# Patient Record
Sex: FEMALE | Race: WHITE | NOT HISPANIC OR LATINO | Employment: OTHER | URBAN - METROPOLITAN AREA
[De-identification: names, ages, dates, MRNs, and addresses within clinical notes are randomized per-mention and may not be internally consistent; named-entity substitution may affect disease eponyms.]

---

## 2018-03-01 ENCOUNTER — OFFICE VISIT (OUTPATIENT)
Dept: OBGYN CLINIC | Facility: CLINIC | Age: 73
End: 2018-03-01
Payer: MEDICARE

## 2018-03-01 VITALS
DIASTOLIC BLOOD PRESSURE: 88 MMHG | SYSTOLIC BLOOD PRESSURE: 142 MMHG | WEIGHT: 202 LBS | BODY MASS INDEX: 31.71 KG/M2 | HEIGHT: 67 IN

## 2018-03-01 DIAGNOSIS — Z01.419 WOMEN'S ANNUAL ROUTINE GYNECOLOGICAL EXAMINATION: ICD-10-CM

## 2018-03-01 DIAGNOSIS — Z12.4 PAP SMEAR FOR CERVICAL CANCER SCREENING: Primary | ICD-10-CM

## 2018-03-01 PROCEDURE — G0101 CA SCREEN;PELVIC/BREAST EXAM: HCPCS | Performed by: OBSTETRICS & GYNECOLOGY

## 2018-03-01 RX ORDER — LEVOTHYROXINE SODIUM 100 MCG
TABLET ORAL
COMMUNITY
Start: 2018-02-09 | End: 2019-05-08 | Stop reason: ALTCHOICE

## 2018-03-01 NOTE — PROGRESS NOTES
Assessment/Plan:    Normal gynecological exam, review some of the options for treating  atrophic discomfort that she has occasionally, she also some concerns about resuming intercourse as her  being treated for low testosterone and will be put on some medicine  We did discuss use of coconut oil and she may try that  We again encouraged her to consider getting a mammogram and she declines     Problem List Items Addressed This Visit     Women's annual routine gynecological examination      Other Visit Diagnoses     Pap smear for cervical cancer screening    -  Primary    Relevant Orders    GP Liquid-Based Pap + HPV Plus            Subjective:      Patient ID: June D Yessi Sagastume is a 74 y o  female  Here for annual gyn exam - overall well - no vaginal bleeding or discharge - declines breast imaging but follows yearly with Dr Emily Tamayo - bowels and bladder normal - up to date with colonoscopy, bone scan and blood work         The following portions of the patient's history were reviewed and updated as appropriate:   She  has a past medical history of Arthritis; Basal cell carcinoma; and Breast lump  She   Patient Active Problem List    Diagnosis Date Noted    Women's annual routine gynecological examination 03/01/2018    Arthritis 02/25/2016    Hypothyroidism 02/25/2016     She  has a past surgical history that includes Breast surgery; Blepharoptosis repair; Total hip arthroplasty (Bilateral); and Breast lumpectomy (Left)  Her family history includes Diabetes in her father; Uterine cancer in her family  She  reports that she has never smoked  She has never used smokeless tobacco  She reports that she does not drink alcohol or use drugs  Current Outpatient Prescriptions   Medication Sig Dispense Refill    SYNTHROID 100 MCG tablet        No current facility-administered medications for this visit  No current outpatient prescriptions on file prior to visit       No current facility-administered medications on file prior to visit  She is allergic to sulfa antibiotics       Review of Systems   Constitutional: Negative for fatigue, fever and unexpected weight change  HENT: Positive for sinus pain and sinus pressure  Negative for dental problem, mouth sores, nosebleeds, rhinorrhea and sore throat  Eyes: Negative for pain, discharge and visual disturbance  Respiratory: Negative for cough, chest tightness, shortness of breath and wheezing  Cardiovascular: Negative for chest pain, palpitations and leg swelling  Gastrointestinal: Negative for blood in stool, constipation, diarrhea, nausea and vomiting  Endocrine: Negative for polydipsia  Genitourinary: Negative for difficulty urinating, dyspareunia, dysuria, menstrual problem, pelvic pain, urgency, vaginal discharge and vaginal pain  Musculoskeletal: Positive for arthralgias  Negative for back pain and joint swelling  Allergic/Immunologic: Positive for environmental allergies  Neurological: Negative for seizures, light-headedness and headaches  Hematological: Does not bruise/bleed easily  Psychiatric/Behavioral: Negative for sleep disturbance  The patient is not nervous/anxious  Objective: There were no vitals taken for this visit  Physical Exam   Constitutional: She is oriented to person, place, and time  She appears well-developed  No distress  Overweight older white female    HENT:   Head: Normocephalic and atraumatic  Neck: Normal range of motion  Neck supple  No thyromegaly present  Cardiovascular: Normal rate and regular rhythm  Pulmonary/Chest: Effort normal and breath sounds normal  No respiratory distress  She has no wheezes  She has no rales  She exhibits no tenderness  Right breast exhibits no inverted nipple, no mass, no nipple discharge, no skin change and no tenderness  Left breast exhibits no inverted nipple, no mass, no nipple discharge, no skin change and no tenderness   Breasts are symmetrical  Abdominal: Soft  She exhibits no distension and no mass  There is no tenderness  There is no rebound and no guarding  Genitourinary: Rectal exam shows guaiac negative stool  Genitourinary Comments: Normal female with atrophic vulvovaginal area, cervix grossly normal appearance, uterus anterior, mobile, nontender, and no adnexal masses or tenderness on palpation  Rectal exam is normal   Neurological: She is alert and oriented to person, place, and time  Psychiatric: She has a normal mood and affect  Her behavior is normal    Vitals reviewed

## 2018-03-06 LAB
HPV HR 12 DNA CVX QL NAA+PROBE: NOT DETECTED
HPV16 DNA SPEC QL NAA+PROBE: NOT DETECTED
HPV18 DNA SPEC QL NAA+PROBE: NOT DETECTED
THIN PREP CVX: NORMAL

## 2019-02-05 LAB — HBA1C MFR BLD HPLC: 5.9 %

## 2019-03-01 LAB — HBA1C MFR BLD HPLC: 6 %

## 2019-04-16 ENCOUNTER — TRANSCRIBE ORDERS (OUTPATIENT)
Dept: ADMINISTRATIVE | Facility: HOSPITAL | Age: 74
End: 2019-04-16

## 2019-04-16 DIAGNOSIS — M25.572 LEFT ANKLE PAIN, UNSPECIFIED CHRONICITY: Primary | ICD-10-CM

## 2019-04-18 ENCOUNTER — HOSPITAL ENCOUNTER (OUTPATIENT)
Dept: RADIOLOGY | Facility: HOSPITAL | Age: 74
Discharge: HOME | End: 2019-04-18
Payer: MEDICARE

## 2019-04-18 DIAGNOSIS — M25.572 LEFT ANKLE PAIN, UNSPECIFIED CHRONICITY: ICD-10-CM

## 2019-04-18 PROCEDURE — 73721 MRI JNT OF LWR EXTRE W/O DYE: CPT

## 2019-05-08 ENCOUNTER — OFFICE VISIT (OUTPATIENT)
Dept: OBGYN CLINIC | Facility: CLINIC | Age: 74
End: 2019-05-08
Payer: MEDICARE

## 2019-05-08 VITALS
HEIGHT: 67 IN | HEART RATE: 97 BPM | BODY MASS INDEX: 32.05 KG/M2 | WEIGHT: 204.2 LBS | SYSTOLIC BLOOD PRESSURE: 120 MMHG | DIASTOLIC BLOOD PRESSURE: 77 MMHG

## 2019-05-08 DIAGNOSIS — M76.829 POSTERIOR TIBIALIS TENDON INSUFFICIENCY: ICD-10-CM

## 2019-05-08 DIAGNOSIS — M19.172 POST-TRAUMATIC OSTEOARTHRITIS OF LEFT ANKLE: Primary | ICD-10-CM

## 2019-05-08 PROCEDURE — 99203 OFFICE O/P NEW LOW 30 MIN: CPT | Performed by: ORTHOPAEDIC SURGERY

## 2019-05-08 RX ORDER — LEVOTHYROXINE SODIUM 88 MCG
TABLET ORAL
COMMUNITY
Start: 2019-02-11 | End: 2019-06-06 | Stop reason: DRUGHIGH

## 2019-05-13 ENCOUNTER — EVALUATION (OUTPATIENT)
Dept: PHYSICAL THERAPY | Facility: REHABILITATION | Age: 74
End: 2019-05-13
Payer: MEDICARE

## 2019-05-13 DIAGNOSIS — M76.829 POSTERIOR TIBIALIS TENDON INSUFFICIENCY: ICD-10-CM

## 2019-05-13 DIAGNOSIS — M19.172 POST-TRAUMATIC OSTEOARTHRITIS OF LEFT ANKLE: ICD-10-CM

## 2019-05-13 PROCEDURE — 97162 PT EVAL MOD COMPLEX 30 MIN: CPT | Performed by: PHYSICAL THERAPIST

## 2019-05-13 PROCEDURE — 97110 THERAPEUTIC EXERCISES: CPT | Performed by: PHYSICAL THERAPIST

## 2019-05-16 ENCOUNTER — OFFICE VISIT (OUTPATIENT)
Dept: PHYSICAL THERAPY | Facility: REHABILITATION | Age: 74
End: 2019-05-16
Payer: MEDICARE

## 2019-05-16 DIAGNOSIS — M76.829 POSTERIOR TIBIALIS TENDON INSUFFICIENCY: ICD-10-CM

## 2019-05-16 DIAGNOSIS — M19.172 POST-TRAUMATIC OSTEOARTHRITIS OF LEFT ANKLE: Primary | ICD-10-CM

## 2019-05-16 PROCEDURE — 97110 THERAPEUTIC EXERCISES: CPT | Performed by: PHYSICAL THERAPIST

## 2019-05-16 PROCEDURE — 97140 MANUAL THERAPY 1/> REGIONS: CPT | Performed by: PHYSICAL THERAPIST

## 2019-05-21 ENCOUNTER — OFFICE VISIT (OUTPATIENT)
Dept: PHYSICAL THERAPY | Facility: REHABILITATION | Age: 74
End: 2019-05-21
Payer: MEDICARE

## 2019-05-21 DIAGNOSIS — M76.829 POSTERIOR TIBIALIS TENDON INSUFFICIENCY: ICD-10-CM

## 2019-05-21 DIAGNOSIS — M19.172 POST-TRAUMATIC OSTEOARTHRITIS OF LEFT ANKLE: Primary | ICD-10-CM

## 2019-05-21 PROCEDURE — 97110 THERAPEUTIC EXERCISES: CPT

## 2019-05-21 PROCEDURE — 97140 MANUAL THERAPY 1/> REGIONS: CPT

## 2019-05-24 ENCOUNTER — APPOINTMENT (OUTPATIENT)
Dept: PHYSICAL THERAPY | Facility: REHABILITATION | Age: 74
End: 2019-05-24
Payer: MEDICARE

## 2019-05-28 ENCOUNTER — OFFICE VISIT (OUTPATIENT)
Dept: PHYSICAL THERAPY | Facility: REHABILITATION | Age: 74
End: 2019-05-28
Payer: MEDICARE

## 2019-05-28 DIAGNOSIS — M19.172 POST-TRAUMATIC OSTEOARTHRITIS OF LEFT ANKLE: Primary | ICD-10-CM

## 2019-05-28 DIAGNOSIS — M76.829 POSTERIOR TIBIALIS TENDON INSUFFICIENCY: ICD-10-CM

## 2019-05-28 PROCEDURE — 97112 NEUROMUSCULAR REEDUCATION: CPT

## 2019-05-28 PROCEDURE — 97110 THERAPEUTIC EXERCISES: CPT

## 2019-05-28 PROCEDURE — 97140 MANUAL THERAPY 1/> REGIONS: CPT

## 2019-05-30 ENCOUNTER — OFFICE VISIT (OUTPATIENT)
Dept: PHYSICAL THERAPY | Facility: REHABILITATION | Age: 74
End: 2019-05-30
Payer: MEDICARE

## 2019-05-30 DIAGNOSIS — M19.172 POST-TRAUMATIC OSTEOARTHRITIS OF LEFT ANKLE: Primary | ICD-10-CM

## 2019-05-30 DIAGNOSIS — M76.829 POSTERIOR TIBIALIS TENDON INSUFFICIENCY: ICD-10-CM

## 2019-05-30 PROCEDURE — 97140 MANUAL THERAPY 1/> REGIONS: CPT | Performed by: PHYSICAL THERAPIST

## 2019-05-30 PROCEDURE — 97110 THERAPEUTIC EXERCISES: CPT | Performed by: PHYSICAL THERAPIST

## 2019-06-03 ENCOUNTER — OFFICE VISIT (OUTPATIENT)
Dept: PHYSICAL THERAPY | Facility: REHABILITATION | Age: 74
End: 2019-06-03
Payer: MEDICARE

## 2019-06-03 DIAGNOSIS — M19.172 POST-TRAUMATIC OSTEOARTHRITIS OF LEFT ANKLE: Primary | ICD-10-CM

## 2019-06-03 DIAGNOSIS — M76.829 POSTERIOR TIBIALIS TENDON INSUFFICIENCY: ICD-10-CM

## 2019-06-03 PROCEDURE — 97112 NEUROMUSCULAR REEDUCATION: CPT

## 2019-06-03 PROCEDURE — 97110 THERAPEUTIC EXERCISES: CPT

## 2019-06-06 ENCOUNTER — OFFICE VISIT (OUTPATIENT)
Dept: FAMILY MEDICINE CLINIC | Facility: CLINIC | Age: 74
End: 2019-06-06
Payer: MEDICARE

## 2019-06-06 ENCOUNTER — OFFICE VISIT (OUTPATIENT)
Dept: PHYSICAL THERAPY | Facility: REHABILITATION | Age: 74
End: 2019-06-06
Payer: MEDICARE

## 2019-06-06 VITALS
HEIGHT: 66 IN | BODY MASS INDEX: 31.88 KG/M2 | OXYGEN SATURATION: 96 % | SYSTOLIC BLOOD PRESSURE: 122 MMHG | TEMPERATURE: 98.6 F | DIASTOLIC BLOOD PRESSURE: 70 MMHG | WEIGHT: 198.4 LBS | RESPIRATION RATE: 16 BRPM | HEART RATE: 85 BPM

## 2019-06-06 DIAGNOSIS — G89.29 CHRONIC BILATERAL THORACIC BACK PAIN: Primary | ICD-10-CM

## 2019-06-06 DIAGNOSIS — M79.7 FIBROMYALGIA: ICD-10-CM

## 2019-06-06 DIAGNOSIS — M94.0 COSTOCHONDRITIS: ICD-10-CM

## 2019-06-06 DIAGNOSIS — R73.01 IFG (IMPAIRED FASTING GLUCOSE): ICD-10-CM

## 2019-06-06 DIAGNOSIS — M19.172 POST-TRAUMATIC OSTEOARTHRITIS OF LEFT ANKLE: Primary | ICD-10-CM

## 2019-06-06 DIAGNOSIS — E03.9 ACQUIRED HYPOTHYROIDISM: ICD-10-CM

## 2019-06-06 DIAGNOSIS — M54.6 CHRONIC BILATERAL THORACIC BACK PAIN: Primary | ICD-10-CM

## 2019-06-06 DIAGNOSIS — M76.829 POSTERIOR TIBIALIS TENDON INSUFFICIENCY: ICD-10-CM

## 2019-06-06 PROCEDURE — 99204 OFFICE O/P NEW MOD 45 MIN: CPT | Performed by: FAMILY MEDICINE

## 2019-06-06 PROCEDURE — 97110 THERAPEUTIC EXERCISES: CPT | Performed by: PHYSICAL THERAPIST

## 2019-06-06 PROCEDURE — 97112 NEUROMUSCULAR REEDUCATION: CPT | Performed by: PHYSICAL THERAPIST

## 2019-06-06 RX ORDER — AMOXICILLIN 500 MG/1
500 CAPSULE ORAL 3 TIMES DAILY
Refills: 0 | COMMUNITY
Start: 2019-05-23 | End: 2019-06-06 | Stop reason: ALTCHOICE

## 2019-06-06 RX ORDER — LEVOTHYROXINE SODIUM 0.07 MG/1
75 TABLET ORAL
Qty: 30 TABLET | Refills: 5 | Status: SHIPPED | OUTPATIENT
Start: 2019-06-06 | End: 2019-07-03 | Stop reason: SDUPTHER

## 2019-06-10 PROBLEM — M94.0 COSTOCHONDRITIS: Status: ACTIVE | Noted: 2019-06-10

## 2019-06-10 PROBLEM — M54.6 CHRONIC BILATERAL THORACIC BACK PAIN: Status: ACTIVE | Noted: 2019-06-10

## 2019-06-10 PROBLEM — R73.01 IFG (IMPAIRED FASTING GLUCOSE): Status: ACTIVE | Noted: 2019-06-10

## 2019-06-10 PROBLEM — M79.7 FIBROMYALGIA: Status: ACTIVE | Noted: 2019-06-10

## 2019-06-10 PROBLEM — G89.29 CHRONIC BILATERAL THORACIC BACK PAIN: Status: ACTIVE | Noted: 2019-06-10

## 2019-06-11 ENCOUNTER — APPOINTMENT (OUTPATIENT)
Dept: PHYSICAL THERAPY | Facility: REHABILITATION | Age: 74
End: 2019-06-11
Payer: MEDICARE

## 2019-06-13 ENCOUNTER — OFFICE VISIT (OUTPATIENT)
Dept: PHYSICAL THERAPY | Facility: REHABILITATION | Age: 74
End: 2019-06-13
Payer: MEDICARE

## 2019-06-13 DIAGNOSIS — M76.829 POSTERIOR TIBIALIS TENDON INSUFFICIENCY: ICD-10-CM

## 2019-06-13 DIAGNOSIS — M19.172 POST-TRAUMATIC OSTEOARTHRITIS OF LEFT ANKLE: Primary | ICD-10-CM

## 2019-06-13 PROCEDURE — 97110 THERAPEUTIC EXERCISES: CPT | Performed by: PHYSICAL THERAPIST

## 2019-06-13 PROCEDURE — 97112 NEUROMUSCULAR REEDUCATION: CPT | Performed by: PHYSICAL THERAPIST

## 2019-06-18 ENCOUNTER — EVALUATION (OUTPATIENT)
Dept: PHYSICAL THERAPY | Facility: REHABILITATION | Age: 74
End: 2019-06-18
Payer: MEDICARE

## 2019-06-18 DIAGNOSIS — M79.7 FIBROMYALGIA: Primary | ICD-10-CM

## 2019-06-18 PROCEDURE — 97161 PT EVAL LOW COMPLEX 20 MIN: CPT | Performed by: PHYSICAL THERAPIST

## 2019-06-20 ENCOUNTER — OFFICE VISIT (OUTPATIENT)
Dept: PHYSICAL THERAPY | Facility: REHABILITATION | Age: 74
End: 2019-06-20
Payer: MEDICARE

## 2019-06-20 DIAGNOSIS — M76.829 POSTERIOR TIBIALIS TENDON INSUFFICIENCY: ICD-10-CM

## 2019-06-20 DIAGNOSIS — M79.7 FIBROMYALGIA: Primary | ICD-10-CM

## 2019-06-20 DIAGNOSIS — M19.172 POST-TRAUMATIC OSTEOARTHRITIS OF LEFT ANKLE: ICD-10-CM

## 2019-06-20 PROCEDURE — 97112 NEUROMUSCULAR REEDUCATION: CPT | Performed by: PHYSICAL THERAPIST

## 2019-06-20 PROCEDURE — 97110 THERAPEUTIC EXERCISES: CPT | Performed by: PHYSICAL THERAPIST

## 2019-06-25 ENCOUNTER — OFFICE VISIT (OUTPATIENT)
Dept: PHYSICAL THERAPY | Facility: REHABILITATION | Age: 74
End: 2019-06-25
Payer: MEDICARE

## 2019-06-25 DIAGNOSIS — M19.172 POST-TRAUMATIC OSTEOARTHRITIS OF LEFT ANKLE: ICD-10-CM

## 2019-06-25 DIAGNOSIS — M79.7 FIBROMYALGIA: Primary | ICD-10-CM

## 2019-06-25 DIAGNOSIS — M76.829 POSTERIOR TIBIALIS TENDON INSUFFICIENCY: ICD-10-CM

## 2019-06-25 PROCEDURE — 97110 THERAPEUTIC EXERCISES: CPT

## 2019-06-25 PROCEDURE — 97112 NEUROMUSCULAR REEDUCATION: CPT

## 2019-06-27 ENCOUNTER — OFFICE VISIT (OUTPATIENT)
Dept: PHYSICAL THERAPY | Facility: REHABILITATION | Age: 74
End: 2019-06-27
Payer: MEDICARE

## 2019-06-27 DIAGNOSIS — M19.172 POST-TRAUMATIC OSTEOARTHRITIS OF LEFT ANKLE: ICD-10-CM

## 2019-06-27 DIAGNOSIS — M79.7 FIBROMYALGIA: Primary | ICD-10-CM

## 2019-06-27 DIAGNOSIS — M76.829 POSTERIOR TIBIALIS TENDON INSUFFICIENCY: ICD-10-CM

## 2019-06-27 PROCEDURE — 97112 NEUROMUSCULAR REEDUCATION: CPT

## 2019-06-27 PROCEDURE — 97110 THERAPEUTIC EXERCISES: CPT

## 2019-07-01 ENCOUNTER — OFFICE VISIT (OUTPATIENT)
Dept: PHYSICAL THERAPY | Facility: REHABILITATION | Age: 74
End: 2019-07-01
Payer: MEDICARE

## 2019-07-01 DIAGNOSIS — M79.7 FIBROMYALGIA: Primary | ICD-10-CM

## 2019-07-01 DIAGNOSIS — M76.829 POSTERIOR TIBIALIS TENDON INSUFFICIENCY: ICD-10-CM

## 2019-07-01 DIAGNOSIS — M19.172 POST-TRAUMATIC OSTEOARTHRITIS OF LEFT ANKLE: ICD-10-CM

## 2019-07-01 PROCEDURE — 97110 THERAPEUTIC EXERCISES: CPT | Performed by: PHYSICAL THERAPIST

## 2019-07-01 PROCEDURE — 97112 NEUROMUSCULAR REEDUCATION: CPT | Performed by: PHYSICAL THERAPIST

## 2019-07-01 NOTE — PROGRESS NOTES
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        Start Time: 7195  Stop Time: 1618  Total time in clinic (min): 48 minutes    Subjective: Mariah reports that her "fibro was really bothering me the other day  I think I over do it "       Objective: See treatment diary below      Assessment: Tolerated treatment well  Patient demonstrated fatigue post treatment, exhibited good technique with therapeutic exercises and would benefit from continued PT  Tolerated additional exercises without issue  Plan: Continue per plan of care         Precautions: Fibromyalgia,     Daily Treatment Diary:    Manuals 2019     Thoracic spine rotation overpressure seated nv nv                            Shldr Exercises          UBE rev nv 5' lvl 1 5' lvl 1 5' lvl 1  5'10w               Scap retractions* 20 20 20x  20x       Diaphragmatic breathing* 5'  5'  5'       Supine breathing angels 20ea 20ea 20 x 20x  30x     Pectoral stretch  10x10" 10 x 10"  10 x10"  10x10''     Rows nv  OTB 3 x 10  OTB 3 x 10  [id number]     Low row elbows extended nv OTB 3x10 OTB 3 x 12  OTB 3 x 12 [id number]     TB Bilateral ER w retraction nv nv OTB 3 x 10  OTB 3 x 12  [id number]     TB Horizontal Abduction                     Pendulums          Pulleys flx, scap nv 2' ea 2' ea 2' ea 2' abd                         Clamshells           Sidelying SLR    2 x10  np     TG Squats     lvl 18 3 x 12  np     Seated ball rolls with rotation     5''x 5 ea                                                                                                                   * = on HEP

## 2019-07-02 ENCOUNTER — LAB (OUTPATIENT)
Dept: LAB | Facility: CLINIC | Age: 74
End: 2019-07-02
Payer: MEDICARE

## 2019-07-02 DIAGNOSIS — E03.9 ACQUIRED HYPOTHYROIDISM: ICD-10-CM

## 2019-07-02 LAB — TSH SERPL DL<=0.05 MIU/L-ACNC: 1.2 UIU/ML (ref 0.36–3.74)

## 2019-07-02 PROCEDURE — 36415 COLL VENOUS BLD VENIPUNCTURE: CPT

## 2019-07-02 PROCEDURE — 84443 ASSAY THYROID STIM HORMONE: CPT

## 2019-07-03 ENCOUNTER — APPOINTMENT (OUTPATIENT)
Dept: PHYSICAL THERAPY | Facility: REHABILITATION | Age: 74
End: 2019-07-03
Payer: MEDICARE

## 2019-07-03 RX ORDER — LEVOTHYROXINE SODIUM 0.07 MG/1
75 TABLET ORAL
Qty: 30 TABLET | Refills: 5 | Status: SHIPPED | OUTPATIENT
Start: 2019-07-03 | End: 2020-01-17

## 2019-07-09 ENCOUNTER — APPOINTMENT (OUTPATIENT)
Dept: PHYSICAL THERAPY | Facility: REHABILITATION | Age: 74
End: 2019-07-09
Payer: MEDICARE

## 2019-07-09 ENCOUNTER — OFFICE VISIT (OUTPATIENT)
Dept: PHYSICAL THERAPY | Facility: REHABILITATION | Age: 74
End: 2019-07-09
Payer: MEDICARE

## 2019-07-09 DIAGNOSIS — M79.7 FIBROMYALGIA: Primary | ICD-10-CM

## 2019-07-09 DIAGNOSIS — M76.829 POSTERIOR TIBIALIS TENDON INSUFFICIENCY: ICD-10-CM

## 2019-07-09 DIAGNOSIS — M19.172 POST-TRAUMATIC OSTEOARTHRITIS OF LEFT ANKLE: ICD-10-CM

## 2019-07-09 PROCEDURE — 97110 THERAPEUTIC EXERCISES: CPT

## 2019-07-09 NOTE — PROGRESS NOTES
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        Start Time: 08  Stop Time: 1024  Total time in clinic (min): 34 minutes    Subjective: Patient reports "everything is flared up today, not a good day "  She reports "over doing it" over the weekend stating "had a party over the weekend, a lot of cleaning and setting up "       Objective: See treatment diary below      Assessment: Tolerated treatment fair  Patient demonstrated fatigue post treatment, exhibited good technique with therapeutic exercises and would benefit from continued PT Certain TE not completed secondary to patients discomfort level  Patient reports no increased pain with any TE performed and relief at end of session stating "think this helped loosen everything up "       Plan: Continue per plan of care  Progress treatment as tolerated                Precautions: Fibromyalgia,     Daily Treatment Diary:    Manuals 2019    Thoracic spine rotation overpressure seated nv nv                            Shldr Exercises          UBE rev nv 5' lvl 1 5' lvl 1 5' lvl 1  5'10w               Scap retractions* 20 20 20x  20x       Diaphragmatic breathing* 5'  5'  5'   5'    Supine breathing angels 20ea 20ea 20 x 20x  30x 30x     Pectoral stretch  10x10" 10 x 10"  10 x10"  10x10'' 10x10''    Rows nv  OTB 3 x 10  OTB 3 x 10  [id number] OTB 3x10     Low row elbows extended nv OTB 3x10 OTB 3 x 12  OTB 3 x 12 [id number] OTB 3x10     TB Bilateral ER w retraction nv nv OTB 3 x 10  OTB 3 x 12  [id number]     TB Horizontal Abduction                     Pendulums          Pulleys flx, scap nv 2' ea 2' ea 2' ea 2' abd 2' abd                        Clamshells Sidelying SLR    2 x10  np     TG Squats     lvl 18 3 x 12  np     Seated ball rolls with rotation     5''x 5 ea                                                                                                                   * = on HEP

## 2019-07-11 ENCOUNTER — APPOINTMENT (OUTPATIENT)
Dept: PHYSICAL THERAPY | Facility: REHABILITATION | Age: 74
End: 2019-07-11
Payer: MEDICARE

## 2019-07-12 ENCOUNTER — OFFICE VISIT (OUTPATIENT)
Dept: PHYSICAL THERAPY | Facility: REHABILITATION | Age: 74
End: 2019-07-12
Payer: MEDICARE

## 2019-07-12 DIAGNOSIS — M76.829 POSTERIOR TIBIALIS TENDON INSUFFICIENCY: ICD-10-CM

## 2019-07-12 DIAGNOSIS — M19.172 POST-TRAUMATIC OSTEOARTHRITIS OF LEFT ANKLE: ICD-10-CM

## 2019-07-12 DIAGNOSIS — M79.7 FIBROMYALGIA: Primary | ICD-10-CM

## 2019-07-12 PROCEDURE — 97112 NEUROMUSCULAR REEDUCATION: CPT | Performed by: PHYSICAL THERAPIST

## 2019-07-12 PROCEDURE — 97110 THERAPEUTIC EXERCISES: CPT | Performed by: PHYSICAL THERAPIST

## 2019-07-12 NOTE — PROGRESS NOTES
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        Start Time: 1032  Stop Time: 1120  Total time in clinic (min): 48 minutes    Subjective: Mariah reports that her ribs and back feel tight today  "I think it's the fibro"       Objective: See treatment diary below      Assessment: Tolerated treatment well  Patient demonstrated fatigue post treatment, exhibited good technique with therapeutic exercises and would benefit from continued PT  Patient progressed through stretching exercises without pain  Patient was educated regarding sympathetic nervous system topics as they pertain to pain, including stress biology, fight or flight response, the role of adrenaline and cortisol in pain, the bodys immune response and multiple output mechanisms  Metaphors were used to promote deep learning, and the role of self-care techniques useful in calming the  sympathetic nervous system were addressed  Patient encouraged to identify and record stress issues they are dealing with on a daily basis since experiencing pain  Patient also encouraged to think about their persona response to a hypothetical large threat, and how the bodys response to pain is mimicking that response  Plan: Continue per plan of care                    Precautions: Fibromyalgia,     Daily Treatment Diary:    Manuals 2019   Thoracic spine rotation overpressure seated nv nv                            Shldr Exercises          UBE rev nv 5' lvl 1 5' lvl 1 5' lvl 1  5'10w  5' 10w             Scap retractions* 20 20 20x  20x       Diaphragmatic breathing* 5'  5'  5'   5' 5'   Supine breathing angels 20ea 20ea 20 x 20x  30x 30x  30x   Pectoral stretch  10x10" 10 x 10"  10 x10"  10x10'' 10x10'' 10x10"   Seated overhead lat stretch       10x10"   Rows nv  OTB 3 x 10  OTB 3 x 10  [id number] OTB 3x10     Low row elbows extended nv OTB 3x10 OTB 3 x 12  OTB 3 x 12 OTB 3x10 OTB 3x10  GTB 3x10   TB Bilateral ER w retraction nv nv OTB 3 x 10  OTB 3 x 12  OTB 3x15     TB Horizontal Abduction                     Pendulums          Pulleys flx, scap nv 2' ea 2' ea 2' ea 2' abd 2' abd 2' abd                       Clamshells           Sidelying SLR    2 x10  np     TG Squats     lvl 18 3 x 12  np     Seated ball rolls with rotation     5''x 5 ea                                                                                                                   * = on HEP

## 2019-07-15 ENCOUNTER — OFFICE VISIT (OUTPATIENT)
Dept: PHYSICAL THERAPY | Facility: REHABILITATION | Age: 74
End: 2019-07-15
Payer: MEDICARE

## 2019-07-15 DIAGNOSIS — M76.829 POSTERIOR TIBIALIS TENDON INSUFFICIENCY: ICD-10-CM

## 2019-07-15 DIAGNOSIS — M19.172 POST-TRAUMATIC OSTEOARTHRITIS OF LEFT ANKLE: ICD-10-CM

## 2019-07-15 DIAGNOSIS — M79.7 FIBROMYALGIA: Primary | ICD-10-CM

## 2019-07-15 PROCEDURE — 97110 THERAPEUTIC EXERCISES: CPT

## 2019-07-15 PROCEDURE — 97112 NEUROMUSCULAR REEDUCATION: CPT

## 2019-07-15 NOTE — PROGRESS NOTES
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        Start Time: 1030  Stop Time: 1110  Total time in clinic (min): 40 minutes    Subjective: Patient reports feeling "much better" since last week  She reports a "flare up" on Saturday secondary to "doing a ton of stuff " Re-evaluate patient next visit  Objective: See treatment diary below      Assessment: Tolerated treatment well  Patient demonstrated fatigue post treatment, exhibited good technique with therapeutic exercises and would benefit from continued PT Re-evaluate patient next visit  Plan: Continue per plan of care  Progress treatment as tolerated                   Precautions: Fibromyalgia,     Daily Treatment Diary:    Manuals 7/15  6/25 6/27 7/1 7/9 7/12   Thoracic spine rotation overpressure seated                              Shldr Exercises          UBE rev 5' 10 W   5' lvl 1 5' lvl 1  5'10w  5' 10w             Scap retractions*   20x  20x       Diaphragmatic breathing* 5'  5'   5' 5'   Supine breathing angels 30x   20 x 20x  30x 30x  30x   Pectoral stretch 10x10''  10 x 10"  10 x10"  10x10'' 10x10'' 10x10"   Seated overhead lat stretch 10x10''      10x10"   Rows   OTB 3 x 10  OTB 3 x 10  [id number] OTB 3x10     Low row elbows extended GTB 3x12  OTB 3 x 12  OTB 3 x 12 OTB 3x10 OTB 3x10  GTB 3x10   TB Bilateral ER w retraction OTB 2x10   OTB 3 x 10  OTB 3 x 12  OTB 3x15     TB Horizontal Abduction                     Pendulums          Pulleys flx, scap 2' abd  2' ea 2' ea 2' abd 2' abd 2' abd                       Clamshells           Sidelying SLR    2 x10  np     TG Squats     lvl 18 3 x 12  np     Seated ball rolls with rotation     5''x 5 ea * = on HEP

## 2019-07-16 ENCOUNTER — APPOINTMENT (OUTPATIENT)
Dept: PHYSICAL THERAPY | Facility: REHABILITATION | Age: 74
End: 2019-07-16
Payer: MEDICARE

## 2019-07-17 ENCOUNTER — EVALUATION (OUTPATIENT)
Dept: PHYSICAL THERAPY | Facility: REHABILITATION | Age: 74
End: 2019-07-17
Payer: MEDICARE

## 2019-07-17 DIAGNOSIS — M79.7 FIBROMYALGIA: Primary | ICD-10-CM

## 2019-07-17 DIAGNOSIS — M76.829 POSTERIOR TIBIALIS TENDON INSUFFICIENCY: ICD-10-CM

## 2019-07-17 DIAGNOSIS — M19.172 POST-TRAUMATIC OSTEOARTHRITIS OF LEFT ANKLE: ICD-10-CM

## 2019-07-17 PROCEDURE — 97112 NEUROMUSCULAR REEDUCATION: CPT

## 2019-07-17 PROCEDURE — 97110 THERAPEUTIC EXERCISES: CPT

## 2019-07-17 NOTE — PROGRESS NOTES
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            Assessment  Assessment details: Mariah Murillo has been compliant with attending PT and home exercise program since initial eval   Mariah  has made improvements in objective data since initial evalulation and has achieved all goals  Patient reports having returned to their prior level or function  Patient provided with updated Home Exercise Program, all questions answered, verbalized understanding and agreement to plan of care   Thus it was mutually decided to discontinue this episode of care and transition to Home Exercise Program    Impairments: activity intolerance, impaired physical strength and pain with function  Understanding of Dx/Px/POC: good   Prognosis: good    Goals  Impairment Goals  - Decrease pain 0/10 partially met  - Improve ROM to 50 degrees thoracic spine rotation met  - Increase strength to 5/5 throughout partially met    Functional Goals  - Return to Prior Level of Function  - Increase Functional Status Measure to: 62   - Patient will be independent with HEP  -Patient will be able to return to gardening and exercising without thoracic spine and rib pain met    Plan  Patient would benefit from: skilled PT  Planned therapy interventions: joint mobilization, manual therapy, patient education, postural training, activity modification, abdominal trunk stabilization, body mechanics training, flexibility, functional ROM exercises, graded exercise, home exercise program, neuromuscular re-education, strengthening, stretching, therapeutic activities and therapeutic exercise  Frequency: 2x week  Duration in weeks: 8  Plan of Care beginning date: 2019  Plan of Care expiration date: 2019  Treatment plan discussed with: patient        Subjective Evaluation    History of Present Illness  Mechanism of injury: Mariah has been seen for total of 9 visits for outpatient physical therapy  Patient rates overall improvement since beginning PT 80%  Patient's global rating of change is " Quite a bit better (5) " Patient reports improvements with breathing and "the sharp pain " within her lungs and thoracic spine  Patient reports most difficulty with humidity and hot days with breathing as well as prolonged  Pain  Current pain ratin  At worst pain ratin  Quality: dull ache and sharp  Relieving factors: change in position  Progression: worsening    Social Support    Employment status: not working  Patient Goals  Patient goals for therapy: increased motion, decreased pain and increased strength          Objective     Palpation   Left   No palpable tenderness to the cervical paraspinals and lower trapezius  Tenderness of the rhomboids  Right   No palpable tenderness to the cervical paraspinals and lower trapezius  Tenderness of the cervical paraspinals and rhomboids       Neurological Testing     Sensation     Shoulder   Left Shoulder   Intact: light touch    Right Shoulder   Intact: light touch    Active Range of Motion   Left Shoulder   Flexion: 165 degrees   Abduction: 160 degrees     Right Shoulder   Flexion: 160 degrees   Abduction: 160 degrees     Passive Range of Motion   Cervical/Thoracic Spine     Thoracic     Flexion (degrees):  WFL  Left rotation (degress): 45  Right rotation (degrees): 45    Strength/Myotome Testing     Left Shoulder     Planes of Motion   Flexion: 5   Abduction: 5   External rotation at 0°: 5   Internal rotation at 0°: 5     Right Shoulder     Planes of Motion   Flexion: 5   Abduction: 5   External rotation at 0°: 5   Internal rotation at 0°: 5     Additional Strength Details  Manual Muscle Test reflects strength within available range of motion     Tests     Left Shoulder   Negative drop arm, empty can, Hawkin's, Neer's, painful arc and Speed's  Right Shoulder   Negative drop arm, empty can, Hawkin's, Neer's, painful arc and Speed's

## 2019-07-17 NOTE — PROGRESS NOTES
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        Start Time: 1058  Stop Time: 1145  Total time in clinic (min): 47 minutes    Subjective: Patient reports feeling "pretty good" prior to session today stating "muscles feel a little tight but okay " See re-eval        Objective: See treatment diary below      Assessment: Tolerated treatment well  Patient demonstrated fatigue post treatment and exhibited good technique with therapeutic exercises  See re-eval      Plan: Patient given updated HEP this session with patient reporting understanding  Patient is to transition to HEP                 Precautions: Fibromyalgia,     Daily Treatment Diary:    Manuals 7/15 7/17   7/1 7/9 7/12   Thoracic spine rotation overpressure seated                              Shldr Exercises          UBE rev 5' 10 W  5' 10W   5'10w  5' 10w             Scap retractions*          Diaphragmatic breathing* 5' 5'    5' 5'   Supine breathing angels 30x  30x   30x 30x  30x   Pectoral stretch 10x10'' 10x10''   10x10'' 10x10'' 10x10"   Seated overhead lat stretch 10x10''      10x10"   Rows     [id number] OTB 3x10     Low row elbows extended GTB 3x12 GTB 3x10   OTB 3x10 OTB 3x10  GTB 3x10   TB Bilateral ER w retraction OTB 2x10     OTB 3x15     TB Horizontal Abduction                     Pendulums          Pulleys flx, scap 2' abd 2' abd   2' abd 2' abd 2' abd                       Clamshells           Sidelying SLR     np     TG Squats      np     Seated ball rolls with rotation     5''x 5 ea                                                                                                                   * = on HEP

## 2019-07-18 ENCOUNTER — APPOINTMENT (OUTPATIENT)
Dept: PHYSICAL THERAPY | Facility: REHABILITATION | Age: 74
End: 2019-07-18
Payer: MEDICARE

## 2019-08-02 ENCOUNTER — OFFICE VISIT (OUTPATIENT)
Dept: FAMILY MEDICINE CLINIC | Facility: CLINIC | Age: 74
End: 2019-08-02
Payer: MEDICARE

## 2019-08-02 VITALS
HEIGHT: 66 IN | HEART RATE: 88 BPM | BODY MASS INDEX: 31.82 KG/M2 | OXYGEN SATURATION: 98 % | SYSTOLIC BLOOD PRESSURE: 140 MMHG | DIASTOLIC BLOOD PRESSURE: 82 MMHG | RESPIRATION RATE: 12 BRPM | WEIGHT: 198 LBS | TEMPERATURE: 97.9 F

## 2019-08-02 DIAGNOSIS — M25.50 PAIN IN JOINT, MULTIPLE SITES: ICD-10-CM

## 2019-08-02 DIAGNOSIS — M53.3 SACRO ILIAL PAIN: ICD-10-CM

## 2019-08-02 DIAGNOSIS — K58.8 OTHER IRRITABLE BOWEL SYNDROME: ICD-10-CM

## 2019-08-02 DIAGNOSIS — R10.84 GENERALIZED ABDOMINAL PAIN: Primary | ICD-10-CM

## 2019-08-02 DIAGNOSIS — M79.7 FIBROMYALGIA: ICD-10-CM

## 2019-08-02 DIAGNOSIS — K59.00 CONSTIPATION, UNSPECIFIED CONSTIPATION TYPE: ICD-10-CM

## 2019-08-02 LAB
BACTERIA UR QL AUTO: ABNORMAL /HPF
BILIRUB UR QL STRIP: NEGATIVE
CLARITY UR: CLEAR
COLOR UR: YELLOW
GLUCOSE UR STRIP-MCNC: NEGATIVE MG/DL
HGB UR QL STRIP.AUTO: NEGATIVE
HYALINE CASTS #/AREA URNS LPF: ABNORMAL /LPF
KETONES UR STRIP-MCNC: NEGATIVE MG/DL
LEUKOCYTE ESTERASE UR QL STRIP: ABNORMAL
NITRITE UR QL STRIP: NEGATIVE
NON-SQ EPI CELLS URNS QL MICRO: ABNORMAL /HPF
PH UR STRIP.AUTO: 6.5 [PH]
PROT UR STRIP-MCNC: NEGATIVE MG/DL
RBC #/AREA URNS AUTO: ABNORMAL /HPF
SL AMB  POCT GLUCOSE, UA: NEGATIVE
SL AMB LEUKOCYTE ESTERASE,UA: NEGATIVE
SL AMB POCT BILIRUBIN,UA: NEGATIVE
SL AMB POCT BLOOD,UA: POSITIVE
SL AMB POCT CLARITY,UA: CLEAR
SL AMB POCT COLOR,UA: YELLOW
SL AMB POCT KETONES,UA: NEGATIVE
SL AMB POCT NITRITE,UA: NEGATIVE
SL AMB POCT PH,UA: 5
SL AMB POCT SPECIFIC GRAVITY,UA: 1000
SL AMB POCT URINE PROTEIN: NEGATIVE
SL AMB POCT UROBILINOGEN: 0.2
SP GR UR STRIP.AUTO: 1.01 (ref 1–1.03)
UROBILINOGEN UR QL STRIP.AUTO: 0.2 E.U./DL
WBC #/AREA URNS AUTO: ABNORMAL /HPF

## 2019-08-02 PROCEDURE — 99214 OFFICE O/P EST MOD 30 MIN: CPT | Performed by: FAMILY MEDICINE

## 2019-08-02 PROCEDURE — 81002 URINALYSIS NONAUTO W/O SCOPE: CPT | Performed by: FAMILY MEDICINE

## 2019-08-02 PROCEDURE — 81001 URINALYSIS AUTO W/SCOPE: CPT | Performed by: FAMILY MEDICINE

## 2019-08-02 PROCEDURE — 87086 URINE CULTURE/COLONY COUNT: CPT | Performed by: FAMILY MEDICINE

## 2019-08-02 RX ORDER — POLYETHYLENE GLYCOL 3350 17 G/17G
17 POWDER, FOR SOLUTION ORAL DAILY
Qty: 14 EACH | Refills: 0 | Status: SHIPPED | OUTPATIENT
Start: 2019-08-02 | End: 2019-09-24 | Stop reason: ALTCHOICE

## 2019-08-02 NOTE — PROGRESS NOTES
Assessment/Plan:   Mariah was seen today for abdominal pain and bloated  Diagnoses and all orders for this visit:    Generalized abdominal pain  -     POCT urine dip  -     Urine culture  -     Urinalysis with microscopic    Pain in joint, multiple sites    Sacro ilial pain  -     Ambulatory referral to Physical Therapy; Future  -     Urine culture  -     Urinalysis with microscopic    Fibromyalgia  -     Ambulatory referral to Physical Therapy; Future    Constipation, unspecified constipation type  -     polyethylene glycol (MIRALAX) 17 g packet; Take 17 g by mouth daily    Other irritable bowel syndrome    reviewed pain and diff dx with patient  Bloating coming from constipation  Use miralax for this  Pain moving and patient with full ROM  Patient most consistent with fibromyalgia flare  Patient visibly upset today and mentions depression multiple times  Reviewed interaction of FM, IBS and mood and may be time to consider treatment for this  She is thinking of taking a natural supplement she has for mood  I recommend we try Cymbalta at a low dose, 20 mg  She will consider, talk about it more at follow up  Will refer back to PT to help with her new symptoms around SI joint and hip flexors  Last PT session greatly helped her upper back and chest pain symptoms  Patient Instructions   Cymbalta       No follow-ups on file  Subjective:    HPI  Mariah is a 66 y o  female who presents with:  Chief Complaint     Abdominal Pain; Bloated          ---Above per clinical staff & reviewed   ---        Pain moves around  Constipation is new - small hard stools at times - not emptying   For years normal to diarrhea - constipation is new   Hemorrhoid and sometimes spot of blood on paper  No blood in stool or on stool, no mucus  Never feels good , "almost depressed"  Not a good appetite due to this   No heart burn , no vomiting   Pressure some in bladder area  New sneakers 3 weeks ago and loved them - wondering if that   All the pains in her chest area are better   No fevers or chills       The following portions of the patient's history were reviewed and updated as appropriate: allergies, current medications, past family history, past medical history, past social history, past surgical history and problem list   Review of Systems  ROS:  all others negative - no chest pain, SOB, normal urine  no GERD  sleeping well  mood as above  Objective:    /82 (BP Location: Left arm)   Pulse 88   Temp 97 9 °F (36 6 °C)   Resp 12   Ht 5' 5 75" (1 67 m)   Wt 89 8 kg (198 lb)   SpO2 98%   BMI 32 20 kg/m²   Wt Readings from Last 3 Encounters:   08/02/19 89 8 kg (198 lb)   06/06/19 90 kg (198 lb 6 4 oz)   05/08/19 92 6 kg (204 lb 3 2 oz)     BP Readings from Last 3 Encounters:   08/02/19 140/82   06/06/19 122/70   05/08/19 120/77     Current Outpatient Medications   Medication Sig Dispense Refill    levothyroxine 75 mcg tablet Take 1 tablet (75 mcg total) by mouth daily in the early morning 30 tablet 5    polyethylene glycol (MIRALAX) 17 g packet Take 17 g by mouth daily 14 each 0     No current facility-administered medications for this visit  Physical Exam   Musculoskeletal:        Right hip: She exhibits tenderness  She exhibits normal range of motion, normal strength, no swelling, no crepitus and no deformity  Lumbar back: She exhibits tenderness and pain  She exhibits normal range of motion, no swelling, no edema, no deformity and no spasm  Back:         Legs:     Constitutional: she appears well-developed and well-nourished  Neck: No pain on exam  Neck supple  Cardiovascular: Normal rate, regular rhythm and normal heart sounds  Pulmonary/Chest: Effort normal and breath sounds normal    Abdominal: Soft  Bowel sounds are normal  There is no tenderness  with palpation  No rebound, rigidity, guarding  Lymphadenopathy: she has no cervical adenopathy     Neurological: she is alert and oriented to person, place, and time  Skin: Skin is warm and dry  Psychiatric: she has a depressed/tearful mood and affect   her behavior is normal

## 2019-08-03 LAB — BACTERIA UR CULT: NORMAL

## 2019-09-24 ENCOUNTER — APPOINTMENT (OUTPATIENT)
Dept: RADIOLOGY | Facility: CLINIC | Age: 74
End: 2019-09-24
Payer: MEDICARE

## 2019-09-24 ENCOUNTER — OFFICE VISIT (OUTPATIENT)
Dept: OBGYN CLINIC | Facility: CLINIC | Age: 74
End: 2019-09-24
Payer: MEDICARE

## 2019-09-24 VITALS
HEART RATE: 76 BPM | DIASTOLIC BLOOD PRESSURE: 82 MMHG | HEIGHT: 67 IN | WEIGHT: 207 LBS | SYSTOLIC BLOOD PRESSURE: 133 MMHG | BODY MASS INDEX: 32.49 KG/M2

## 2019-09-24 DIAGNOSIS — M25.571 PAIN, JOINT, ANKLE AND FOOT, RIGHT: ICD-10-CM

## 2019-09-24 DIAGNOSIS — M19.071 PRIMARY OSTEOARTHRITIS OF RIGHT ANKLE: Primary | ICD-10-CM

## 2019-09-24 DIAGNOSIS — Q68.8 OS TRIGONUM: ICD-10-CM

## 2019-09-24 PROCEDURE — 73610 X-RAY EXAM OF ANKLE: CPT

## 2019-09-24 PROCEDURE — 99214 OFFICE O/P EST MOD 30 MIN: CPT | Performed by: ORTHOPAEDIC SURGERY

## 2019-09-24 NOTE — PROGRESS NOTES
Assessment/Plan:  1  Primary osteoarthritis of right ankle  Ambulatory referral to Physical Therapy   2  Os trigonum     3  Pain, joint, ankle and foot, right  XR ankle 3+ vw right       Scribe Attestation    I,:   Nelly Razo am acting as a scribe while in the presence of the attending physician :        I,:   Ulysses Walker MD personally performed the services described in this documentation    as scribed in my presence :          Upon physical examination and imaging, Mariah is demonstrating signs and symptoms consistent with right ankle osteoarthritis  I do believe we can treat this conservatively, which would include ice and formal physical therapy  I provided her with a prescription for physical therapy today in the office  I did advise her to continue to be as active as her pain tolerates  I did state that she will have flare-ups on occasion and to just ice and relax during those flare-ups  She does use orthotics and I instructed her to continue to use them when ambulating  We will see her back on an as-needed basis  Subjective:   Mariah Meadows is a 66 y o  female who presents to the office today for initial evaluation of right ankle pain with no mechanism of injury  She states she noticed excruciating pain on Monday and was unable to ambulate without pain and discomfort  She denies any past injuries to this ankle  At today's visit, she localizes majority of pain on the medial aspect of her ankle  She describes the pain as dull, achy and mild to moderate in intensity  She states she has taking Aleve which has provided her with some relief  She denies any radicular symptoms  She denies any numbness and tingling  Review of Systems   Constitutional: Negative for chills, fever and unexpected weight change  HENT: Negative for hearing loss, nosebleeds and sore throat  Eyes: Negative for pain, redness and visual disturbance     Respiratory: Negative for cough, shortness of breath and wheezing  Cardiovascular: Negative for chest pain, palpitations and leg swelling  Gastrointestinal: Negative for abdominal pain, nausea and vomiting  Endocrine: Negative for polydipsia and polyuria  Genitourinary: Negative for dysuria and hematuria  Musculoskeletal: Positive for arthralgias and myalgias  See HPI   Skin: Negative for rash and wound  Neurological: Negative for dizziness, numbness and headaches  Psychiatric/Behavioral: Negative for decreased concentration and suicidal ideas  The patient is not nervous/anxious            Past Medical History:   Diagnosis Date    Arthritis     Basal cell carcinoma     Breast lump     Disease of thyroid gland     Kidney stone     Stone       Past Surgical History:   Procedure Laterality Date    BELPHAROPTOSIS REPAIR      BREAST LUMPECTOMY Left     BREAST SURGERY      Puncture aspiration of a cyst    JOINT REPLACEMENT      both hips    TONSILLECTOMY      TOTAL HIP ARTHROPLASTY Bilateral     Impression 2/25/16    WISDOM TOOTH EXTRACTION         Family History   Problem Relation Age of Onset    Diabetes Father     Uterine cancer Family     Stroke Mother     No Known Problems Sister     No Known Problems Brother     No Known Problems Maternal Aunt     No Known Problems Maternal Uncle     No Known Problems Paternal Aunt     No Known Problems Paternal Uncle     No Known Problems Maternal Grandmother     No Known Problems Maternal Grandfather     No Known Problems Paternal Grandmother     No Known Problems Paternal Grandfather        Social History     Occupational History    Not on file   Tobacco Use    Smoking status: Former Smoker     Packs/day: 1 00     Years: 6 00     Pack years: 6 00    Smokeless tobacco: Never Used   Substance and Sexual Activity    Alcohol use: Yes     Frequency: Never     Drinks per session: 1 or 2    Drug use: No    Sexual activity: Yes     Partners: Male     Birth control/protection: None Current Outpatient Medications:     levothyroxine 75 mcg tablet, Take 1 tablet (75 mcg total) by mouth daily in the early morning, Disp: 30 tablet, Rfl: 5    Allergies   Allergen Reactions    Sulfa Antibiotics Shortness Of Breath       Objective:  Vitals:    09/24/19 1055   BP: 133/82   Pulse: 76       Right Ankle Exam     Tenderness   The patient is experiencing no tenderness  Swelling: none    Range of Motion   The patient has normal right ankle ROM  Muscle Strength   Dorsiflexion:  5/5  Plantar flexion:  5/5  Posterior tibial:  5/5  Peroneal muscle:  5/5    Tests   Anterior drawer: negative  Varus tilt: negative    Other   Erythema: absent  Scars: absent  Sensation: normal  Pulse: present (2+ dorsal pedal)           Observations     Right Ankle/Foot   Negative for adhesive scar  Strength/Myotome Testing     Right Ankle/Foot   Dorsiflexion: 5  Plantar flexion: 5      Physical Exam   Constitutional: She is oriented to person, place, and time  She appears well-developed and well-nourished  HENT:   Head: Normocephalic and atraumatic  Eyes: Pupils are equal, round, and reactive to light  Conjunctivae are normal  Right eye exhibits no discharge  Left eye exhibits no discharge  Neck: Normal range of motion  Neck supple  Cardiovascular: Normal rate and intact distal pulses  Pulmonary/Chest: Effort normal  No respiratory distress  Musculoskeletal:   As noted in HPI  Neurological: She is alert and oriented to person, place, and time  Skin: Skin is warm and dry  Vitals reviewed  I have personally reviewed pertinent films in PACS and my interpretation is as follows:  X-rays of the right ankle obtained on 09/24/2019 demonstrates moderate tibiotalar osteoarthritis, as well as an os trigonum  There is an increase calcium density within the plantar fascia and Achilles insertion on the calcaneus

## 2019-09-26 DIAGNOSIS — R73.01 IFG (IMPAIRED FASTING GLUCOSE): Primary | ICD-10-CM

## 2019-09-26 RX ORDER — LANCETS
EACH MISCELLANEOUS
COMMUNITY
End: 2019-09-26 | Stop reason: SDUPTHER

## 2019-09-26 NOTE — TELEPHONE ENCOUNTER
Accu Check Soft Clix lancets MIS and Accu Check Lashae Plus ALYCE BLOUNT test strips are needed, she is testing once a day  Dr Angeli Oakley in [facility] used to order these for her but he has passed away and she is in need of these supplies    [facility] on [address]

## 2019-09-27 RX ORDER — LANCETS
EACH MISCELLANEOUS DAILY
Qty: 100 EACH | Refills: 1 | Status: SHIPPED | OUTPATIENT
Start: 2019-09-27 | End: 2021-01-06 | Stop reason: SDUPTHER

## 2019-09-30 ENCOUNTER — TELEPHONE (OUTPATIENT)
Dept: FAMILY MEDICINE CLINIC | Facility: CLINIC | Age: 74
End: 2019-09-30

## 2019-09-30 NOTE — TELEPHONE ENCOUNTER
Rebeka Aviles from [facility] called, Lancets were ordered for the patient and the Dx code used was R73 01 but should be E11 9 or E11 65  If patient does not have one of these diagnoses then the Lancets will not be covered under the insurance, please call the pharmacy to let them know

## 2019-10-01 NOTE — TELEPHONE ENCOUNTER
Please let her know that with IFG medicare will not pay for lancets  She can either pay for them on her own, or not check her sugars  I am fine with her not checking her sugars, just watch her diet and her weight  Charting her food with a food diary or food luna  is a better way to watch

## 2019-10-01 NOTE — TELEPHONE ENCOUNTER
Patient has IFG not a diagnosis of DM  Will route to provider to to ensure chart is accurate  Last A1C was 6 0 on 3/1/19

## 2019-10-03 NOTE — TELEPHONE ENCOUNTER
Pharmacy called back and asked about the order for the Lancets, I explained to her that the patient does not have a dx of Diabetes so we informed the patient and she will decide whether or not she wants to pay out of pocket for them and pick them up or not

## 2019-10-29 ENCOUNTER — TELEPHONE (OUTPATIENT)
Dept: FAMILY MEDICINE CLINIC | Facility: CLINIC | Age: 74
End: 2019-10-29

## 2019-10-29 NOTE — TELEPHONE ENCOUNTER
Placed call to patient to advise records are ready for   Patient stated she will pick them up at her next office visit  Patient requested to move her 12/19 appointment until 1/20 for AWV  Scheduled while patient was on the phone  Patient is also requesting labs for appointment - previous doctor would always order the following: CRP, TSH, A1C, T4, T3, TPO antibody, Lipid, CBC, CMP  Will route to provider

## 2019-10-30 NOTE — TELEPHONE ENCOUNTER
Please let Mariah know that the only test needed to monitor the thyroid is a TSH  The other tests are sometimes ordered at first in diagnosing a thyroid disorder, but is not needed to monitor  Her last CRP was normal, and is also not a test we recommend yearly  I do recommend CMP, lipids, TSH and HbA1C  The others are not needed based on her diagnosis  It is not likely that Medicare will pay for the other labs either  If she wants them, and is willing to pay for them, they can be ordered but she will need to sign an ABN waiver  (please order what she asks for

## 2019-10-31 DIAGNOSIS — R79.82 ELEVATED C-REACTIVE PROTEIN: ICD-10-CM

## 2019-10-31 DIAGNOSIS — E03.9 ACQUIRED HYPOTHYROIDISM: ICD-10-CM

## 2019-10-31 DIAGNOSIS — E03.9 HYPOTHYROIDISM, UNSPECIFIED TYPE: Primary | ICD-10-CM

## 2019-10-31 DIAGNOSIS — R73.01 IFG (IMPAIRED FASTING GLUCOSE): ICD-10-CM

## 2019-11-06 ENCOUNTER — EVALUATION (OUTPATIENT)
Dept: PHYSICAL THERAPY | Facility: REHABILITATION | Age: 74
End: 2019-11-06
Payer: MEDICARE

## 2019-11-06 DIAGNOSIS — M19.071 ARTHRITIS OF RIGHT ANKLE: Primary | ICD-10-CM

## 2019-11-06 PROCEDURE — 97161 PT EVAL LOW COMPLEX 20 MIN: CPT | Performed by: PHYSICAL THERAPIST

## 2019-11-06 PROCEDURE — 97110 THERAPEUTIC EXERCISES: CPT | Performed by: PHYSICAL THERAPIST

## 2019-11-06 NOTE — PROGRESS NOTES
PT Evaluation     Today's date: 2019  Patient name: Mariah Valera  : 1945  MRN: [id number]  Referring provider: Redd West MD  Dx:   Encounter Diagnosis     ICD-10-CM    1  Arthritis of right ankle M19 071        Start Time: 930  Stop Time: 102  Total time in clinic (min): 55 minutes    Assessment  Assessment details: Mariah Valera is a 66 y o  female present with:   Arthritis of right ankle  (primary encounter diagnosis)    Mariah has the above listed impairments and will benefit from skilled Physical Therapist management to improve deficits to return to prior level of function  Mariah currently not experiencing any active pain at time of eval, she does have some strength and range of motion deficits which could benefit treatment and then transition towards HEP as able     Impairments: abnormal muscle firing, abnormal or restricted ROM, activity intolerance, impaired physical strength, lacks appropriate home exercise program and pain with function  Understanding of Dx/Px/POC: good   Prognosis: good    Goals  Impairment Goals  - Decrease pain 0/10  - Improve ROM to 15 degrees dorsiflexion  - Increase strength to 5/5 throughout    Functional Goals  - Return to Prior Level of Function  - Increase Functional Status Measure to: 80  - Patient will be independent with HEP  -Patient will be able to return to walking without pain 1 mile on treadmill    Plan  Patient would benefit from: skilled PT  Planned therapy interventions: joint mobilization, manual therapy, patient education, postural training, activity modification, abdominal trunk stabilization, body mechanics training, flexibility, functional ROM exercises, graded exercise, home exercise program, neuromuscular re-education, strengthening, stretching, therapeutic activities and therapeutic exercise  Frequency: 1x week  Duration in weeks: 4  Plan of Care beginning date: 2019  Plan of Care expiration date: 2019  Treatment plan discussed with: patient        Subjective Evaluation    History of Present Illness  Mechanism of [place]:  [place] randomly her right ankle began bothering her  "I didn't do anything to it, and I walk a mile at the gym and ride the bike for 5-6 miles regularly " Denies swelling to it at that time  Reports seeing Dr Escobar Hartmann at which time the ankle was no longer bothering her  Mariah is concerned what she will do if or when her pain returns  Recurrent probem    Quality of life: excellent    Pain  Current pain ratin  At worst pain rating: 10  Location: R ankle  Quality: sharp  Relieving factors: ice and rest  Progression: improved    Social Support    Employment status: not working  Patient Goals  Patient goals for therapy: improved balance, decreased pain, increased strength, increased motion and return to sport/leisure activities          Objective     Active Range of Motion   Left Ankle/Foot   Dorsiflexion (ke): 5 degrees   Plantar flexion: 45 degrees   Inversion: 20 degrees   Eversion: 25 degrees     Right Ankle/Foot   Dorsiflexion (ke): 10 degrees   Plantar flexion: 45 degrees   Inversion: 20 degrees   Eversion: 25 degrees     Passive Range of Motion   Left Ankle/Foot    Dorsiflexion (ke): 15 degrees   Inversion: 25 degrees   Eversion: 25 degrees     Right Ankle/Foot    Dorsiflexion (ke): 12 degrees   Plantar flexion: 45 degrees   Inversion: 30 degrees   Eversion: 25 degrees     Joint Play     Right Ankle/Foot  Hypomobile in the distal tibiofibular joint, talocrural joint and subtalar joint  Strength/Myotome Testing     Left Ankle/Foot   Dorsiflexion: 4  Plantar flexion: 3+  Inversion: 3  Eversion: 3    Right Ankle/Foot   Dorsiflexion: 4+  Plantar flexion: 4+  Inversion: 4  Eversion: 4    Tests     Right Ankle/Foot   Negative for anterior drawer and posterior drawer           Precautions: fibromyalgia, left ankle pain, hypothyroidism,     Daily Treatment Diary    Manual 11/6/2019        Subtalar AP mobs nv                 Ankle Exercises                           Ankle TB DF,INV,EV BTB 3x15                 Bridges nv        SLR abd*                           Flexibility         HS stretch         Gastroc stretch*         Soleus Stretch         Quad stretch                           TA/Closed Chain                           Heel raises nv        TG Squats         Band walks eversion biased         Step ups (involved LE up, uninvolved down) nv                 Step overs nv        Mini squats w ue support         STS from chair/low mat         Squats w target         Goblet squats                  Balance/[employer]         BAPS board sitting         FTEO         Tandem nv        SL nv        Tandem ambulation         Marches                  SL skaters         SL ball toss         SL on foam         SL skaters on foam                  * = on hep

## 2019-11-07 ENCOUNTER — APPOINTMENT (OUTPATIENT)
Dept: PHYSICAL THERAPY | Facility: REHABILITATION | Age: 74
End: 2019-11-07
Payer: MEDICARE

## 2019-11-08 ENCOUNTER — OFFICE VISIT (OUTPATIENT)
Dept: FAMILY MEDICINE CLINIC | Facility: CLINIC | Age: 74
End: 2019-11-08
Payer: MEDICARE

## 2019-11-08 VITALS
WEIGHT: 205.6 LBS | DIASTOLIC BLOOD PRESSURE: 78 MMHG | BODY MASS INDEX: 32.27 KG/M2 | RESPIRATION RATE: 12 BRPM | OXYGEN SATURATION: 98 % | SYSTOLIC BLOOD PRESSURE: 132 MMHG | HEIGHT: 67 IN | TEMPERATURE: 98.3 F | HEART RATE: 78 BPM

## 2019-11-08 DIAGNOSIS — M26.623 BILATERAL TEMPOROMANDIBULAR JOINT PAIN: ICD-10-CM

## 2019-11-08 DIAGNOSIS — J30.2 SEASONAL ALLERGIC RHINITIS, UNSPECIFIED TRIGGER: Primary | ICD-10-CM

## 2019-11-08 PROCEDURE — 99213 OFFICE O/P EST LOW 20 MIN: CPT | Performed by: FAMILY MEDICINE

## 2019-11-08 RX ORDER — CHOLECALCIFEROL (VITAMIN D3) 10(400)/ML
DROPS ORAL DAILY
COMMUNITY
End: 2020-01-17

## 2019-11-08 NOTE — PATIENT INSTRUCTIONS
Flonase or generic of this - 2 puffs once a day   Use nasal saline few times a day   Temporomandibular Disorder   WHAT YOU NEED TO KNOW:   Temporomandibular disorder is a condition that causes pain in your jaw  The disorder affects the joint between your temporal bone and your mandible (jawbone)  The muscles and nerves around the joint are also affected  DISCHARGE INSTRUCTIONS:   Medicines:   · Pain medicine: You may be given a prescription medicine to decrease pain  Do not wait until the pain is severe before you take this medicine  · NSAIDs:  These medicines decrease swelling and pain  You can buy NSAIDs without a doctor's order  Ask your healthcare provider which medicine is right for you, and how much to take  Take as directed  NSAIDs can cause stomach bleeding or kidney problems if not taken correctly  · Muscle relaxers  help decrease pain and muscle spasms  · Take your medicine as directed  Contact your healthcare provider if you think your medicine is not helping or if you have side effects  Tell him of her if you are allergic to any medicine  Keep a list of the medicines, vitamins, and herbs you take  Include the amounts, and when and why you take them  Bring the list or the pill bottles to follow-up visits  Carry your medicine list with you in case of an emergency  Follow up with your healthcare provider as directed:  Write down your questions so you remember to ask them during your visits  Manage your symptoms:   · Eat soft foods: Your healthcare provider may suggest that you eat only soft foods for several days  A dietitian may work with you to find foods that are easier to bite, chew, or swallow  Examples are soup, applesauce, cottage cheese, pudding, yogurt, and soft fruits  · Use jaw supporting devices:  Splints may be used to support your jaw or keep your jaw from moving   You may need to wear a mouth guard to keep you from clenching or grinding your teeth while you are sleeping  · Use ice and heat:  Ice helps decrease swelling and pain  Ice may also help prevent tissue damage  Use an ice pack, or put crushed ice in a plastic bag  Cover it with a towel and place it on your jaw for 15 to 20 minutes every hour or as directed  After the first 24 to 48 hours, use heat to decrease pain, swelling, and muscle spasms  Apply heat on the area for 20 to 30 minutes every 2 hours for as many days as directed  Use a heating pad, moist warm compress, or a hot water bottle  · Go to physical therapy:  A physical therapist teaches you exercises to help improve movement and strength, and to decrease pain in your jaw  A speech therapist may help you with swallowing and speech exercises  Contact your healthcare provider if:   · You have a fever  · Your splint or mouth guard is loose  · You have questions or concerns about your condition or care  Return to the emergency department if:   · You have nausea, are vomiting, or cannot keep liquids down  · You have pain that does not go away even after you take your pain medicine  · You have problems breathing, talking, drinking, eating, or swallowing  · Your splint or mouth guard gets damaged or broken  © 2017 [employer]  Information is for End User's use only and may not be sold, redistributed or otherwise used for commercial purposes  All illustrations and images included in CareNotes® are the copyrighted property of A D [employer] M [employer] Inc  or Ashu Lee  The above information is an  only  It is not intended as medical advice for individual conditions or treatments  Talk to your doctor, nurse or pharmacist before following any medical regimen to see if it is safe and effective for you

## 2019-11-08 NOTE — PROGRESS NOTES
Assessment/Plan:   Mariah was seen today for earache and sinusitis  Diagnoses and all orders for this visit:    Seasonal allergic rhinitis, unspecified trigger    Bilateral temporomandibular joint pain    Reviewed symptoms today and ways to help  She will continue her over the counter natural supplements  Can start flonase if she wishes  Use ice/heat, tylenol or NSAIDs for TMJ  Call if persists or worsens        Patient Instructions   Flonase or generic of this - 2 puffs once a day   Use nasal saline few times a day   Temporomandibular Disorder   WHAT YOU NEED TO KNOW:   Temporomandibular disorder is a condition that causes pain in your jaw  The disorder affects the joint between your temporal bone and your mandible (jawbone)  The muscles and nerves around the joint are also affected  DISCHARGE INSTRUCTIONS:   Medicines:   · Pain medicine: You may be given a prescription medicine to decrease pain  Do not wait until the pain is severe before you take this medicine  · NSAIDs:  These medicines decrease swelling and pain  You can buy NSAIDs without a doctor's order  Ask your healthcare provider which medicine is right for you, and how much to take  Take as directed  NSAIDs can cause stomach bleeding or kidney problems if not taken correctly  · Muscle relaxers  help decrease pain and muscle spasms  · Take your medicine as directed  Contact your healthcare provider if you think your medicine is not helping or if you have side effects  Tell him of her if you are allergic to any medicine  Keep a list of the medicines, vitamins, and herbs you take  Include the amounts, and when and why you take them  Bring the list or the pill bottles to follow-up visits  Carry your medicine list with you in case of an emergency  Follow up with your healthcare provider as directed:  Write down your questions so you remember to ask them during your visits  Manage your symptoms:   · Eat soft foods:   Your healthcare provider may suggest that you eat only soft foods for several days  A dietitian may work with you to find foods that are easier to bite, chew, or swallow  Examples are soup, applesauce, cottage cheese, pudding, yogurt, and soft fruits  · Use jaw supporting devices:  Splints may be used to support your jaw or keep your jaw from moving  You may need to wear a mouth guard to keep you from clenching or grinding your teeth while you are sleeping  · Use ice and heat:  Ice helps decrease swelling and pain  Ice may also help prevent tissue damage  Use an ice pack, or put crushed ice in a plastic bag  Cover it with a towel and place it on your jaw for 15 to 20 minutes every hour or as directed  After the first 24 to 48 hours, use heat to decrease pain, swelling, and muscle spasms  Apply heat on the area for 20 to 30 minutes every 2 hours for as many days as directed  Use a heating pad, moist warm compress, or a hot water bottle  · Go to physical therapy:  A physical therapist teaches you exercises to help improve movement and strength, and to decrease pain in your jaw  A speech therapist may help you with swallowing and speech exercises  Contact your healthcare provider if:   · You have a fever  · Your splint or mouth guard is loose  · You have questions or concerns about your condition or care  Return to the emergency department if:   · You have nausea, are vomiting, or cannot keep liquids down  · You have pain that does not go away even after you take your pain medicine  · You have problems breathing, talking, drinking, eating, or swallowing  · Your splint or mouth guard gets damaged or broken  © 2017 [facility] [facility] Information is for End User's use only and may not be sold, redistributed or otherwise used for commercial purposes  All illustrations and images included in CareNotes® are the copyrighted property of [employer]  or Ashu Lee    The above information is an  only  It is not intended as medical advice for individual conditions or treatments  Talk to your doctor, nurse or pharmacist before following any medical regimen to see if it is safe and effective for you  No follow-ups on file  Subjective:    PUJA Lemus is a 66 y o  female who presents with:  Chief Complaint     Earache; Sinusitis        HPI     Earache      Additional comments: B/L, worse on Lt side           Last edited by Tony Garrison MA on 11/8/2019  1:07 PM  (History)        ---Above per clinical staff & reviewed  ---        Sinus pressure forehead   Pressure in ears  Grapefruit seed extract nose - nose is dry   No sneezing   Ear uncomfortable   No fevers  No nasal drainage   cloidial silver  Vitamin C   Some burning in esophagus with pnd   xlear     The following portions of the patient's history were reviewed and updated as appropriate: allergies, current medications, past family history, past medical history, past social history, past surgical history and problem list   Review of Systems  ROS:  all others negative - no chest pain, SOB, normal urine and bowels  no GERD  sleeping well  mood good     Objective:    /78   Pulse 78   Temp 98 3 °F (36 8 °C)   Resp 12   Ht 5' 7" (1 702 m)   Wt 93 3 kg (205 lb 9 6 oz)   SpO2 98%   BMI 32 20 kg/m²   Wt Readings from Last 3 Encounters:   11/08/19 93 3 kg (205 lb 9 6 oz)   09/24/19 93 9 kg (207 lb)   08/02/19 89 8 kg (198 lb)     BP Readings from Last 3 Encounters:   11/08/19 132/78   09/24/19 133/82   08/02/19 140/82     Current Outpatient Medications   Medication Sig Dispense Refill    ACCU-CHEK SOFTCLIX LANCETS lancets by Other route daily Testing once daily 100 each 1    cholecalciferol (VITAMIN D) 400 units/mL Take by mouth daily      glucose blood test strip 1 each by Other route daily Testing once daily   Accu Check Lashae Plus ALYCE BLOUNT test strips 100 each 1    levothyroxine 75 mcg tablet Take 1 tablet (75 mcg total) by mouth daily in the early morning 30 tablet 5     No current facility-administered medications for this visit  Physical Exam   Constitutional: she appears well-developed and well-nourished  HENT: Head: Normocephalic  Right Ear: External ear normal  Tympanic membrane normal    Left Ear: External ear normal  Tympanic membrane normal    Nose: Nose normal  No mucosal edema, No rhinorrhea  Right sinus exhibits no maxillary sinus tenderness  Left sinus exhibits no maxillary sinus tenderness  Mouth/Throat: Oropharynx is clear and moist    Eyes: Normal conjunctiva  No erythema  No discharge  Neck: No pain on exam  Neck supple  Cardiovascular: Normal rate, regular rhythm and normal heart sounds  Pulmonary/Chest: Effort normal and breath sounds normal    Abdominal: Soft  Bowel sounds are normal  There is no tenderness  Lymphadenopathy: she has no cervical adenopathy  Neurological: she is alert and oriented to person, place, and time  Skin: Skin is warm and dry  Psychiatric: she has a normal mood and affect   her behavior is normal

## 2019-11-13 ENCOUNTER — OFFICE VISIT (OUTPATIENT)
Dept: PHYSICAL THERAPY | Facility: REHABILITATION | Age: 74
End: 2019-11-13
Payer: MEDICARE

## 2019-11-13 DIAGNOSIS — M19.071 ARTHRITIS OF RIGHT ANKLE: Primary | ICD-10-CM

## 2019-11-13 PROCEDURE — 97110 THERAPEUTIC EXERCISES: CPT

## 2019-11-13 PROCEDURE — 97112 NEUROMUSCULAR REEDUCATION: CPT

## 2019-11-13 NOTE — PROGRESS NOTES
Daily Note     Today's date: 2019  Patient name: June D Lequita Boeck  : 1945  MRN: [id number]  Referring provider: Nick Shankar MD  Dx:   Encounter Diagnosis     ICD-10-CM    1  Arthritis of right ankle M19 071        Start Time: 1030  Stop Time: 1113  Total time in clinic (min): 43 minutes    Subjective: Patient reports no pain prior to session stating "it's perfect, I feel great "       Objective: See treatment diary below      Assessment: Tolerated treatment well  Patient demonstrated fatigue post treatment, exhibited good technique with therapeutic exercises and would benefit from continued PT Patient reports no discomfort with any TE performed, unable to complete tandem stance, minimal instability with semi tandem, with frequent use of UE support for SL stance  Plan: Continue per plan of care  Progress treatment as tolerated         Precautions: fibromyalgia, left ankle pain, hypothyroidism,     Daily Treatment Diary    Manual 2019       Subtalar AP mobs nv                 Ankle Exercises                           Ankle TB DF,INV,EV BTB 3x15 BTB 3x15                Bridges nv 3x10 bw       SLR abd*         Bike  5 min                Flexibility         HS stretch         Gastroc stretch*         Soleus Stretch         Quad stretch                           TA/Closed Chain                           Heel raises nv 3x10       TG Squats         Band walks eversion biased         Step ups (involved LE up, uninvolved down) nv 6'' 3x10 ea                Step overs nv 4'' 3x10        Mini squats w ue support         STS from chair/low mat         Squats w target         Goblet squats                  Balance/Proprio         BAPS board sitting         FTEO         Tandem nv :20x3 ea       SL nv :05x10       Tandem ambulation         Marches                  SL skaters         SL ball toss         SL on foam         SL skaters on foam                  * = on hep

## 2019-11-21 ENCOUNTER — OFFICE VISIT (OUTPATIENT)
Dept: PHYSICAL THERAPY | Facility: REHABILITATION | Age: 74
End: 2019-11-21
Payer: MEDICARE

## 2019-11-21 DIAGNOSIS — M19.071 ARTHRITIS OF RIGHT ANKLE: Primary | ICD-10-CM

## 2019-11-21 PROCEDURE — 97112 NEUROMUSCULAR REEDUCATION: CPT | Performed by: PHYSICAL THERAPIST

## 2019-11-21 PROCEDURE — 97110 THERAPEUTIC EXERCISES: CPT | Performed by: PHYSICAL THERAPIST

## 2019-11-21 NOTE — PROGRESS NOTES
Daily Note     Today's date: 2019  Patient name: Mariah Merino  : 1945  MRN: [id number]  Referring provider: Roland Stahl MD  Dx:   Encounter Diagnosis     ICD-10-CM    1  Arthritis of right ankle M19 071        Start Time: 1200  Stop Time: 1250  Total time in clinic (min): 50 minutes    Subjective: Mariah reports that her ankle has been feeling great  Objective: See treatment diary below      Assessment: Tolerated treatment fair  Patient demonstrated fatigue post treatment, exhibited good technique with therapeutic exercises and would benefit from continued PT  Limited tolerance to exercises today due to fibromyalgia flare up and low back pain, her right ankle is not currently having any limitation  Plan: Continue per plan of care           Precautions: fibromyalgia, left ankle pain, hypothyroidism,     Daily Treatment Diary    Manual 2019      Subtalar AP mobs nv                 Ankle Exercises                           Ankle TB DF,INV,EV BTB 3x15 BTB 3x15 BTB 3x15               Bridges nv 3x10 bw 3x6 pain back      SLR abd*         Bike  5 min 5' L 1               Flexibility         HS stretch         Gastroc stretch*         Soleus Stretch         Quad stretch                           TA/Closed Chain                           Heel raises nv 3x10 3x12      TG Squats         Band walks eversion biased         Step ups (involved LE up, uninvolved down) nv 6'' 3x10 ea Pain in knee               Step overs nv 4'' 3x10  Pain in knee      Mini squats w ue support         STS from chair/low mat         Squats w target         Goblet squats                  Balance/Proprio         BAPS board sitting         FTEO         Tandem nv :20x3 ea 4x20"      SL nv :05x10 15" x4      Tandem ambulation         [employer] skaters         SL ball toss         SL on foam         SL skaters on foam                  * = on hep

## 2019-11-27 ENCOUNTER — APPOINTMENT (OUTPATIENT)
Dept: PHYSICAL THERAPY | Facility: REHABILITATION | Age: 74
End: 2019-11-27
Payer: MEDICARE

## 2019-12-13 NOTE — PROGRESS NOTES
PT Discharge    Today's date: 2019  Patient name: Ritchie Alfonso  : 1945  MRN: [id number]  Referring provider: Courtney Vargas MD  Dx:   Encounter Diagnosis     ICD-10-CM    1  Arthritis of right ankle M19 071        Start Time: 1200  Stop Time: 1250  Total time in clinic (min): 50 minutes    Assessment/Plan   Bobbiolga lidia Celiss has been compliant with attending PT and home exercise program since initial eval     has made improvements in objective data since initial evalulation and has achieved all goals  Patient reports having returned to 95% of their prior level or function  Patient provided with updated Home Exercise Program, all questions answered, verbalized understanding and agreement to plan of care   Thus it was mutually decided to discontinue this episode of care and transition to Home Exercise Program    Subjective    Objective    Flowsheet Rows      Most Recent Value   PT/OT G-Codes   Current Score  83   Projected Score  78

## 2020-01-07 DIAGNOSIS — M79.7 FIBROMYALGIA: Primary | ICD-10-CM

## 2020-01-07 DIAGNOSIS — M19.90 ARTHRITIS: ICD-10-CM

## 2020-01-09 ENCOUNTER — LAB (OUTPATIENT)
Dept: LAB | Facility: CLINIC | Age: 75
End: 2020-01-09
Payer: MEDICARE

## 2020-01-09 DIAGNOSIS — R73.01 IFG (IMPAIRED FASTING GLUCOSE): ICD-10-CM

## 2020-01-09 DIAGNOSIS — E03.9 HYPOTHYROIDISM, UNSPECIFIED TYPE: ICD-10-CM

## 2020-01-09 DIAGNOSIS — M79.7 FIBROMYALGIA: ICD-10-CM

## 2020-01-09 DIAGNOSIS — E03.9 ACQUIRED HYPOTHYROIDISM: ICD-10-CM

## 2020-01-09 DIAGNOSIS — R79.82 ELEVATED C-REACTIVE PROTEIN: ICD-10-CM

## 2020-01-09 DIAGNOSIS — M19.90 ARTHRITIS: ICD-10-CM

## 2020-01-09 LAB
25(OH)D3 SERPL-MCNC: 57.6 NG/ML (ref 30–100)
ALBUMIN SERPL BCP-MCNC: 3.6 G/DL (ref 3.5–5)
ALP SERPL-CCNC: 84 U/L (ref 46–116)
ALT SERPL W P-5'-P-CCNC: 21 U/L (ref 12–78)
ANION GAP SERPL CALCULATED.3IONS-SCNC: 10 MMOL/L (ref 4–13)
AST SERPL W P-5'-P-CCNC: 18 U/L (ref 5–45)
BILIRUB SERPL-MCNC: 0.63 MG/DL (ref 0.2–1)
BUN SERPL-MCNC: 13 MG/DL (ref 5–25)
CALCIUM SERPL-MCNC: 9 MG/DL (ref 8.3–10.1)
CHLORIDE SERPL-SCNC: 105 MMOL/L (ref 100–108)
CHOLEST SERPL-MCNC: 205 MG/DL (ref 50–200)
CO2 SERPL-SCNC: 27 MMOL/L (ref 21–32)
CREAT SERPL-MCNC: 0.77 MG/DL (ref 0.6–1.3)
CRP SERPL QL: 5.3 MG/L
EST. AVERAGE GLUCOSE BLD GHB EST-MCNC: 111 MG/DL
GFR SERPL CREATININE-BSD FRML MDRD: 75 ML/MIN/1.73SQ M
GLUCOSE P FAST SERPL-MCNC: 93 MG/DL (ref 65–99)
HBA1C MFR BLD: 5.5 % (ref 4.2–6.3)
HCYS SERPL-SCNC: 10.1 UMOL/L (ref 3.7–11.2)
HDLC SERPL-MCNC: 74 MG/DL
LDLC SERPL CALC-MCNC: 122 MG/DL (ref 0–100)
NONHDLC SERPL-MCNC: 131 MG/DL
POTASSIUM SERPL-SCNC: 3.9 MMOL/L (ref 3.5–5.3)
PROT SERPL-MCNC: 7.3 G/DL (ref 6.4–8.2)
SODIUM SERPL-SCNC: 142 MMOL/L (ref 136–145)
T4 FREE SERPL-MCNC: 1.25 NG/DL (ref 0.76–1.46)
TRIGL SERPL-MCNC: 44 MG/DL
TSH SERPL DL<=0.05 MIU/L-ACNC: 4.04 UIU/ML (ref 0.36–3.74)

## 2020-01-09 PROCEDURE — 83090 ASSAY OF HOMOCYSTEINE: CPT

## 2020-01-09 PROCEDURE — 86140 C-REACTIVE PROTEIN: CPT

## 2020-01-09 PROCEDURE — 84439 ASSAY OF FREE THYROXINE: CPT

## 2020-01-09 PROCEDURE — 80053 COMPREHEN METABOLIC PANEL: CPT

## 2020-01-09 PROCEDURE — 82306 VITAMIN D 25 HYDROXY: CPT

## 2020-01-09 PROCEDURE — 80061 LIPID PANEL: CPT

## 2020-01-09 PROCEDURE — 36415 COLL VENOUS BLD VENIPUNCTURE: CPT

## 2020-01-09 PROCEDURE — 84443 ASSAY THYROID STIM HORMONE: CPT

## 2020-01-09 PROCEDURE — 83036 HEMOGLOBIN GLYCOSYLATED A1C: CPT

## 2020-01-17 ENCOUNTER — OFFICE VISIT (OUTPATIENT)
Dept: FAMILY MEDICINE CLINIC | Facility: CLINIC | Age: 75
End: 2020-01-17
Payer: MEDICARE

## 2020-01-17 VITALS
SYSTOLIC BLOOD PRESSURE: 132 MMHG | TEMPERATURE: 98.2 F | HEIGHT: 67 IN | HEART RATE: 79 BPM | OXYGEN SATURATION: 97 % | BODY MASS INDEX: 33.21 KG/M2 | DIASTOLIC BLOOD PRESSURE: 80 MMHG | WEIGHT: 211.6 LBS | RESPIRATION RATE: 18 BRPM

## 2020-01-17 DIAGNOSIS — E66.09 CLASS 1 OBESITY DUE TO EXCESS CALORIES WITHOUT SERIOUS COMORBIDITY WITH BODY MASS INDEX (BMI) OF 33.0 TO 33.9 IN ADULT: ICD-10-CM

## 2020-01-17 DIAGNOSIS — E03.9 ACQUIRED HYPOTHYROIDISM: ICD-10-CM

## 2020-01-17 DIAGNOSIS — Z00.00 WELL ADULT EXAM: Primary | ICD-10-CM

## 2020-01-17 DIAGNOSIS — M79.7 FIBROMYALGIA: ICD-10-CM

## 2020-01-17 DIAGNOSIS — I83.93 VARICOSE VEINS OF BOTH LOWER EXTREMITIES, UNSPECIFIED WHETHER COMPLICATED: ICD-10-CM

## 2020-01-17 DIAGNOSIS — M94.0 COSTOCHONDRITIS: ICD-10-CM

## 2020-01-17 DIAGNOSIS — K58.2 IRRITABLE BOWEL SYNDROME WITH BOTH CONSTIPATION AND DIARRHEA: ICD-10-CM

## 2020-01-17 DIAGNOSIS — Z29.9 PREVENTIVE MEASURE: ICD-10-CM

## 2020-01-17 DIAGNOSIS — Z78.0 POST-MENOPAUSAL: ICD-10-CM

## 2020-01-17 DIAGNOSIS — R73.01 IFG (IMPAIRED FASTING GLUCOSE): ICD-10-CM

## 2020-01-17 PROCEDURE — 99214 OFFICE O/P EST MOD 30 MIN: CPT | Performed by: FAMILY MEDICINE

## 2020-01-17 PROCEDURE — G0439 PPPS, SUBSEQ VISIT: HCPCS | Performed by: FAMILY MEDICINE

## 2020-01-17 PROCEDURE — 1123F ACP DISCUSS/DSCN MKR DOCD: CPT | Performed by: FAMILY MEDICINE

## 2020-01-17 RX ORDER — MELATONIN
3000 DAILY
Start: 2020-01-17 | End: 2021-01-26 | Stop reason: ALTCHOICE

## 2020-01-17 RX ORDER — LEVOTHYROXINE SODIUM 0.07 MG/1
75 TABLET ORAL DAILY
COMMUNITY
End: 2020-01-17

## 2020-01-17 RX ORDER — LEVOTHYROXINE SODIUM 88 UG/1
88 TABLET ORAL DAILY
Qty: 30 TABLET | Refills: 5 | Status: SHIPPED | OUTPATIENT
Start: 2020-01-17 | End: 2020-06-29 | Stop reason: SDUPTHER

## 2020-01-17 NOTE — PROGRESS NOTES
Assessment/Plan:  1  Well adult exam  See other noted   She has a GYN appointment this year with Dr Jesús Ochoa  Declines any vaccines   2  Acquired hypothyroidism  TSH a little high and pt symptomatic  She would like to try a higher dose of BRAND NECESSARY synthroid 88 mcg daily  Repeat labs in 8 weeks  - levothyroxine 88 mcg tablet; Take 1 tablet (88 mcg total) by mouth daily Brand necessary  Dispense: 30 tablet; Refill: 5  - TSH, 3rd generation with Free T4 reflex; Future    3  Post-menopausal  Update dexa  She is unsure if she would want to take any meds, but would want to know  Her last dexa was about 5 years ago and was normal    - DXA bone density spine hip and pelvis; Future    4  Varicose veins of both lower extremities, unspecified whether complicated  Not painful  Recommend compression stockings  5  IFG (impaired fasting glucose)  Improved nicely from last visit  Encouraged continued healthy diet  6  Fibromyalgia  Continues with aches and pains  Working on staying active and eating well  Offered PT and encourged daily exercise  7  Irritable bowel syndrome with both constipation and diarrhea  No changes  8  Costochondritis  Improved with PT      9  Class 1 obesity due to excess calories without serious comorbidity with body mass index (BMI) of 33 0 to 33 9 in adult  Encouraged diet and exercise to help for a healthier BMI  10  Preventive measure  Vitamin d on high end  Recommend she lower her dose from 4000 to 3000    - cholecalciferol (VITAMIN D3) 1,000 units tablet; Take 3 tablets (3,000 Units total) by mouth daily Over the counter liquid 3000 iu  per day    Return in about 6 months (around 7/17/2020) for thyroid & BP       Subjective:   Mariah is a 66 y o  female here today for a follow-up on her current medical conditions:  Patient Active Problem List   Diagnosis    Arthritis    Acquired hypothyroidism    Fibromyalgia    Costochondritis    Chronic bilateral thoracic back pain    IFG (impaired fasting glucose)    Irritable bowel syndrome with both constipation and diarrhea    History of basal cell carcinoma (BCC) of skin        Current Outpatient Medications   Medication Sig Dispense Refill    ACCU-CHEK SOFTCLIX LANCETS lancets by Other route daily Testing once daily 100 each 1    glucose blood test strip 1 each by Other route daily Testing once daily   Accu Check Lashae Plus ALYCE BLOUNT test strips 100 each 1    cholecalciferol (VITAMIN D3) 1,000 units tablet Take 3 tablets (3,000 Units total) by mouth daily Over the counter liquid 3000 iu  per day      levothyroxine 88 mcg tablet Take 1 tablet (88 mcg total) by mouth daily Brand necessary 30 tablet 5     No current facility-administered medications for this visit  HPI:  Chief Complaint   Patient presents with   [facility] Wellness Visit     declined flu and pneumo  CRC within last 10 years     Labs Only     done 1/9/20202     -- Above per clinical staff and reviewed  --    PHQ-9 Depression Screening    PHQ-9:    Frequency of the following problems over the past two weeks:       Little interest or pleasure in doing things:  0 - not at all  Feeling down, depressed, or hopeless:  0 - not at all  PHQ-2 Score:  0         MRI 4/18/19 posterior tibialis tendon tear left ankle  Jan 2020 labs homocystein 10 1, vit D 57 6 , CRP 5 3,   chol 205, TG 44, HDL 74,  ASCVD risk 19%   TSH 4 035  free T4 1 25   HbA1C 5 5 (from 6 0) FBS 93, AST/ALT 18/21  Cr 0 77, GFR 75   last   visit referred to PT  Miralax recommended  mood reviewed - was thinking of a supplement for mood  consider Cymbalta   20 mg  ortho right ankle OA - PT ice orthics   Today:  Gaining weight - or just cannot lose weight   Fatigue   IBS   Gets so nervous coming here felt like she would throw up     Reviewed blood work in detail today     Using flonase for sinuses - spaces her head out   Still discomfort from her left ear   Walking 3 times a week, on bike 3 times a week The following portions of the patient's history were reviewed and updated as appropriate: allergies, current medications, past family history, past medical history, past social history, past surgical history and problem list     Objective:  Vitals:  /80   Pulse 79   Temp 98 2 °F (36 8 °C) (Tympanic)   Resp 18   Ht 5' 7" (1 702 m)   Wt 96 kg (211 lb 9 6 oz)   SpO2 97%   BMI 33 14 kg/m²    Wt Readings from Last 3 Encounters:   01/17/20 96 kg (211 lb 9 6 oz)   11/08/19 93 3 kg (205 lb 9 6 oz)   09/24/19 93 9 kg (207 lb)      BP Readings from Last 3 Encounters:   01/17/20 132/80   11/08/19 132/78   09/24/19 133/82        Review of Systems   She has no other concerns  No unexpected weight changes  No chest pain, SOB, or palpitations  No GERD  No changes in bladder  Alternating constipation and diarrhea  Sleeping well  No mood changes  Physical Exam   Constitutional:  she appears well-developed and well-nourished  HENT: Head: Normocephalic  Neck: Neck supple  Cardiovascular: Normal rate, regular rhythm and normal heart sounds  Pulmonary/Chest: Effort normal and breath sounds normal  No wheezes, rales, or rhonchi  Abdominal: Soft  Bowel sounds are normal  There is no tenderness  No hepatosplenomegaly  Musculoskeletal: she exhibits no edema  Lymphadenopathy: she has no cervical adenopathy  Neurological: she is alert and oriented to person, place, and time  Skin: Skin is warm and dry  SKs on back  Psychiatric: she has a mildly anxious mood and affect   her behavior is normal  Thought content normal

## 2020-01-17 NOTE — PROGRESS NOTES
Mariah is here for her Subsequent Wellness visit  Health Risk Assessment:   Patient rates overall health as very good  Patient feels that their physical health rating is slightly better  Eyesight was rated as same  Hearing was rated as same  Patient feels that their emotional and mental health rating is same  Pain experienced in the last 7 days has been some  Patient's pain rating has been 4/10  Patient states that she has experienced no weight loss or gain in last 6 months  Depression Screening:   PHQ-2 Score: 0      Fall Risk Screening: In the past year, patient has experienced: no history of falling in past year      Urinary Incontinence Screening:   Patient has not leaked urine accidently in the last six months  Home Safety:  Patient has trouble with stairs inside or outside of their home  Patient has working smoke alarms and has working carbon monoxide detector  Home safety hazards include: none  Nutrition:   Current diet is Low Carb  Gluten free     Medications:   Patient is currently taking over-the-counter supplements  OTC medications include: see medication list  Patient is able to manage medications  Activities of Daily Living (ADLs)/Instrumental Activities of Daily Living (IADLs):   Walk and transfer into and out of bed and chair?: Yes  Dress and groom yourself?: Yes    Bathe or shower yourself?: Yes    Feed yourself? Yes  Do your laundry/housekeeping?: Yes  Manage your money, pay your bills and track your expenses?: Yes  Make your own meals?: Yes    Do your own shopping?: Yes    Previous Hospitalizations:   Any hospitalizations or ED visits within the last 12 months?: No      Advance Care Planning:   Living will: Yes    Durable POA for healthcare: Yes    Advanced directive: Yes    End of Life Decisions reviewed with patient: Yes      Comments: Wishes are for her  and daughter to make the decision if needed  She will bring a copy in for us       Cognitive Screening:   Provider or family/friend/caregiver concerned regarding cognition?: No    PREVENTIVE SCREENINGS      Cardiovascular Screening:    General: Screening Current      Diabetes Screening:     General: Screening Current      Colorectal Cancer Screening:     General: Screening Current      Breast Cancer Screening:     General: Patient Declines      Cervical Cancer Screening:    General: Screening Not Indicated      Osteoporosis Screening:      Due for: DXA Appendicular      Abdominal Aortic Aneurysm (AAA) Screening:        General: Screening Not Indicated      Lung Cancer Screening:     General: Screening Not Indicated      Hepatitis C Screening:    General: Screening Not Indicated    Health Maintenance Due   Topic Date Due    Medicare Annual Wellness Visit (AWV)  1945      Reviewed appropriate diabetic & cardiovascular screening and prevention  Reviewed age appropriate cancer screenings and pros and cons  Reviewed bloodwork & immunizations that are appropriate for this patient  Advanced care planning was reviewed  She will bring in her paperwork  After reviews of risks, benefits of above ordered appropriate tests that pt agrees to  She has a GYN appointment this year with Dr Roxane Courtney  Declines any vaccines   DEXA ordered although she is not sure if she would take any meds if her bones have thinned  Declines mammo

## 2020-01-18 PROBLEM — K58.2 IRRITABLE BOWEL SYNDROME WITH BOTH CONSTIPATION AND DIARRHEA: Status: ACTIVE | Noted: 2019-08-02

## 2020-01-18 PROBLEM — Z85.828 HISTORY OF BASAL CELL CARCINOMA (BCC) OF SKIN: Status: ACTIVE | Noted: 2020-01-18

## 2020-01-18 PROBLEM — Z01.419 WOMEN'S ANNUAL ROUTINE GYNECOLOGICAL EXAMINATION: Status: RESOLVED | Noted: 2018-03-01 | Resolved: 2020-01-18

## 2020-01-23 ENCOUNTER — OFFICE VISIT (OUTPATIENT)
Dept: FAMILY MEDICINE CLINIC | Facility: CLINIC | Age: 75
End: 2020-01-23
Payer: MEDICARE

## 2020-01-23 VITALS
HEART RATE: 90 BPM | SYSTOLIC BLOOD PRESSURE: 160 MMHG | HEIGHT: 67 IN | WEIGHT: 211 LBS | OXYGEN SATURATION: 95 % | DIASTOLIC BLOOD PRESSURE: 82 MMHG | TEMPERATURE: 100.7 F | BODY MASS INDEX: 33.12 KG/M2

## 2020-01-23 DIAGNOSIS — J01.00 ACUTE NON-RECURRENT MAXILLARY SINUSITIS: ICD-10-CM

## 2020-01-23 DIAGNOSIS — R35.0 URINARY FREQUENCY: Primary | ICD-10-CM

## 2020-01-23 LAB
SL AMB  POCT GLUCOSE, UA: NEGATIVE
SL AMB LEUKOCYTE ESTERASE,UA: ABNORMAL
SL AMB POCT BILIRUBIN,UA: NEGATIVE
SL AMB POCT BLOOD,UA: ABNORMAL
SL AMB POCT CLARITY,UA: CLEAR
SL AMB POCT COLOR,UA: YELLOW
SL AMB POCT KETONES,UA: ABNORMAL
SL AMB POCT NITRITE,UA: NEGATIVE
SL AMB POCT PH,UA: 6
SL AMB POCT SPECIFIC GRAVITY,UA: 1.01
SL AMB POCT URINE PROTEIN: ABNORMAL
SL AMB POCT UROBILINOGEN: NEGATIVE

## 2020-01-23 PROCEDURE — 99214 OFFICE O/P EST MOD 30 MIN: CPT | Performed by: FAMILY MEDICINE

## 2020-01-23 PROCEDURE — 81003 URINALYSIS AUTO W/O SCOPE: CPT | Performed by: FAMILY MEDICINE

## 2020-01-23 PROCEDURE — 87086 URINE CULTURE/COLONY COUNT: CPT | Performed by: FAMILY MEDICINE

## 2020-01-23 PROCEDURE — 81001 URINALYSIS AUTO W/SCOPE: CPT | Performed by: FAMILY MEDICINE

## 2020-01-23 NOTE — PROGRESS NOTES
Assessment/Plan:   Mariah was seen today for cough, fever and hoarse  Diagnoses and all orders for this visit:    Urinary frequency  -     POCT urine dip auto non-scope w/blood and ketones  Will send for UA and culture  Treat only if + for infection  Last POCT UA had blood but formal UA was negative for blood  -     Urinalysis with microscopic  -     Urine culture    Acute non-recurrent maxillary sinusitis  Viral illness likley  No signs/symptoms of influenza  Rest, fluids  Tylenol/Motrin as needed for pain/fevers  Anticipate 7 - 10 days of illness  if persists or worsens call  May use Mucinex DM  She does not like to take meds  May continue elderberry  Patient Instructions   netti pot can be helpful  No follow-ups on file  Subjective:    HPI  Mariah is a 66 y o  female who presents with:  Chief Complaint     Cough; Fever; Hoarse        HPI     Cough      Additional comments: sinus congestion, dry non productive cough, wakes up at night              Fever      Additional comments: TMax 101, no OTC meds           Last edited by Chepe Moe LPN on 5/99/9564  0:84 PM  (History)        ---Above per clinical staff & reviewed  ---      Started 2 days ago with worsening sinus pressure (feels like she always has this)  New cough dry mainly  Throat irritated  No ear pain  Nasal discharge mild and clear  Chest pain like her costochondritis - some tenderness posterior ribs on left  Fever 101 yesterday, 100 today  Took one advil only  Some urinary urgency  No dysuria  Drinking more fluids  Not burning      The following portions of the patient's history were reviewed and updated as appropriate: allergies, current medications, past family history, past medical history, past social history, past surgical history and problem list   Review of Systems  ROS:  all others negative - no SOB, normal  Bowels now  no GERD  sleeping well  mood good  No wheezing     Objective:    /82   Pulse 90   Temp (!) 100 7 °F (38 2 °C) (Tympanic)   Ht 5' 7" (1 702 m)   Wt 95 7 kg (211 lb)   SpO2 95%   BMI 33 05 kg/m²   Wt Readings from Last 3 Encounters:   01/23/20 95 7 kg (211 lb)   01/17/20 96 kg (211 lb 9 6 oz)   11/08/19 93 3 kg (205 lb 9 6 oz)     BP Readings from Last 3 Encounters:   01/23/20 160/82   01/17/20 132/80   11/08/19 132/78     Current Outpatient Medications   Medication Sig Dispense Refill    ACCU-CHEK SOFTCLIX LANCETS lancets by Other route daily Testing once daily 100 each 1    cholecalciferol (VITAMIN D3) 1,000 units tablet Take 3 tablets (3,000 Units total) by mouth daily Over the counter liquid 3000 iu  per day      glucose blood test strip 1 each by Other route daily Testing once daily   Accu Check Lashae Plus ALYCE BLOUNT test strips 100 each 1    levothyroxine 88 mcg tablet Take 1 tablet (88 mcg total) by mouth daily Brand necessary 30 tablet 5     No current facility-administered medications for this visit  Physical Exam   Constitutional: she appears well-developed and well-nourished  HENT: Head: Normocephalic  Right Ear: External ear normal  Tympanic membrane normal    Left Ear: External ear normal  Tympanic membrane normal    Nose: Nose normal  + mucosal edema & erythema, No rhinorrhea  Right sinus exhibits + maxillary sinus tenderness  Left sinus exhibits + maxillary sinus tenderness  Mouth/Throat: Oropharynx is clear and moist    Eyes: Normal conjunctiva  No erythema  No discharge  Neck: No pain on exam  Neck supple  Cardiovascular: Normal rate, regular rhythm and normal heart sounds  Pulmonary/Chest: Effort normal and breath sounds normal    Abdominal: Soft  Bowel sounds are normal  There is no tenderness  No CVA tenderness  Lymphadenopathy: she has no cervical adenopathy  Neurological: she is alert and oriented to person, place, and time  Skin: Skin is warm and dry  Psychiatric: she has a normal mood and affect  her behavior is normal           BMI Counseling:  Body mass index is 33 05 kg/m²  The BMI is above normal  Nutrition recommendations include decreasing portion sizes  Exercise recommendations include exercising 3-5 times per week

## 2020-01-24 LAB
BACTERIA UR QL AUTO: ABNORMAL /HPF
BILIRUB UR QL STRIP: NEGATIVE
CLARITY UR: CLEAR
COLOR UR: YELLOW
GLUCOSE UR STRIP-MCNC: NEGATIVE MG/DL
HGB UR QL STRIP.AUTO: NEGATIVE
HYALINE CASTS #/AREA URNS LPF: ABNORMAL /LPF
KETONES UR STRIP-MCNC: ABNORMAL MG/DL
LEUKOCYTE ESTERASE UR QL STRIP: ABNORMAL
NITRITE UR QL STRIP: NEGATIVE
NON-SQ EPI CELLS URNS QL MICRO: ABNORMAL /HPF
PH UR STRIP.AUTO: 6 [PH]
PROT UR STRIP-MCNC: NEGATIVE MG/DL
RBC #/AREA URNS AUTO: ABNORMAL /HPF
SP GR UR STRIP.AUTO: 1.01 (ref 1–1.03)
UROBILINOGEN UR QL STRIP.AUTO: 0.2 E.U./DL
WBC #/AREA URNS AUTO: ABNORMAL /HPF

## 2020-01-26 LAB — BACTERIA UR CULT: NORMAL

## 2020-02-09 ENCOUNTER — PATIENT MESSAGE (OUTPATIENT)
Dept: FAMILY MEDICINE CLINIC | Facility: CLINIC | Age: 75
End: 2020-02-09

## 2020-02-09 DIAGNOSIS — E66.09 CLASS 1 OBESITY DUE TO EXCESS CALORIES WITH SERIOUS COMORBIDITY AND BODY MASS INDEX (BMI) OF 33.0 TO 33.9 IN ADULT: Primary | ICD-10-CM

## 2020-02-11 NOTE — TELEPHONE ENCOUNTER
From: Mariah Mendiola  To: Srinivasan Nguyen DO  Sent: 2/9/2020 1:35 PM EST  Subject: Non-Urgent Medical Question    Fran Georges and I were interested in the weight loss (non surgical) programs at [facility]  Do you think this would be a good option for us? Do they have one at [facility]? Thanks for your info   Massimo Murphy

## 2020-02-11 NOTE — TELEPHONE ENCOUNTER
Please let her know yes it is a good option to look into  Referral pending in her chart - please place one in bills also

## 2020-02-12 ENCOUNTER — HOSPITAL ENCOUNTER (OUTPATIENT)
Dept: RADIOLOGY | Facility: HOSPITAL | Age: 75
Discharge: HOME | End: 2020-02-12
Payer: MEDICARE

## 2020-02-12 DIAGNOSIS — Z78.0 POST-MENOPAUSAL: ICD-10-CM

## 2020-02-12 PROCEDURE — 77080 DXA BONE DENSITY AXIAL: CPT

## 2020-02-27 ENCOUNTER — OFFICE VISIT (OUTPATIENT)
Dept: BARIATRICS | Facility: CLINIC | Age: 75
End: 2020-02-27
Payer: MEDICARE

## 2020-02-27 VITALS
TEMPERATURE: 99.5 F | RESPIRATION RATE: 17 BRPM | HEIGHT: 66 IN | SYSTOLIC BLOOD PRESSURE: 132 MMHG | BODY MASS INDEX: 33.62 KG/M2 | HEART RATE: 76 BPM | WEIGHT: 209.2 LBS | DIASTOLIC BLOOD PRESSURE: 68 MMHG

## 2020-02-27 DIAGNOSIS — R73.01 IFG (IMPAIRED FASTING GLUCOSE): ICD-10-CM

## 2020-02-27 DIAGNOSIS — R63.5 ABNORMAL WEIGHT GAIN: Primary | ICD-10-CM

## 2020-02-27 DIAGNOSIS — E03.9 ACQUIRED HYPOTHYROIDISM: ICD-10-CM

## 2020-02-27 DIAGNOSIS — E66.9 CLASS 1 OBESITY: ICD-10-CM

## 2020-02-27 PROBLEM — E66.811 CLASS 1 OBESITY: Status: ACTIVE | Noted: 2020-02-27

## 2020-02-27 PROCEDURE — 99204 OFFICE O/P NEW MOD 45 MIN: CPT | Performed by: PHYSICIAN ASSISTANT

## 2020-02-27 RX ORDER — MULTIVIT WITH MINERALS/LUTEIN
250 TABLET ORAL DAILY
COMMUNITY
End: 2020-07-07 | Stop reason: ALTCHOICE

## 2020-02-27 RX ORDER — CHLORAL HYDRATE 500 MG
CAPSULE ORAL
COMMUNITY
End: 2020-07-07 | Stop reason: ALTCHOICE

## 2020-02-27 NOTE — ASSESSMENT & PLAN NOTE
-Discussed options of [facility] Lifestyle Intervention Program and Conservative Program and the role of weight loss medications   -Initial weight loss goal of 5-10% weight loss for improved health  -Screening labs: reviewed  -Patient is interested in pursuing [facility]    Negative Stop-Bang

## 2020-02-27 NOTE — PROGRESS NOTES
Assessment/Plan:    Class 1 obesity  -Discussed options of [facility]estyle Intervention Program and Conservative Program and the role of weight loss medications   -Initial weight loss goal of 5-10% weight loss for improved health  -Screening labs: reviewed  -Patient is interested in pursuing [facility]    Negative Stop-Bang    IFG (impaired fasting glucose)  -HgbA1c previously elevated, most recent HgbA1c WNL  -continue dietary/lifestyle changes    Acquired hypothyroidism  -on levothyroxine  -recently adjust due to elevated TSH    Goals:    Food log (ie ) www myfitnesspal com,sparkpeople  com,loseit com,calorieking  com,etc    No sugary beverages  At least 64oz of water daily  Increase physical activity by 10 minutes daily  Gradually increase physical activity to a goal of 5 days per week for 30 minutes of MODERATE intensity PLUS 2 days per week of FULL BODY resistance training    Follow up in approximately 1 week with Non-Surgical Dietician  Diagnoses and all orders for this visit:    Abnormal weight gain  Comments:  see plan under class 1 obesity    Class 1 obesity    IFG (impaired fasting glucose)    Acquired hypothyroidism    BMI 33 0-33 9,adult  -     Ambulatory referral to Weight Management    Other orders  -     ascorbic acid (VITAMIN C) 250 mg tablet; Take 250 mg by mouth daily  -     Multiple Vitamins-Minerals (MULTIVITAMIN ADULT PO); Take by mouth  -     NON FORMULARY  -     Omega-3 1000 MG CAPS; Take by mouth  -     NON FORMULARY; Boluke          Subjective:   Chief Complaint   Patient presents with    Consult     Patient is here for [facility] consult  Stop Bang 2/8  Patient ID: June D Erika Zamudio  is a 66 y o  female with excess weight/obesity here to pursue weight managment      Past Medical History:   Diagnosis Date    Arthritis     Basal cell carcinoma     Breast lump     Disease of thyroid gland     Kidney stone     Stone         HPI:  Obesity/Excess Weight:  Severity: Mild  Onset:  paat 20-30 years  Modifiers: Optifast, AtkinsAlessandro  Contributing factors: thyroid, stress eating  Associated symptoms: affects her mood, clothes do not fit    Goals: 150-170 lbs    B: gluten free bread + almond butter/honey   S: skips  L: apple + almond butter, OR tuna salad Or chicken salad OR gluten free bagel  S: skips or handful of almonds  D: Salad Or Chicken + sweet potato  S: 100 aida yogurt bar OR gluten free pretzels and kombucha    Hydration: water 50 oz, 1-2 cups of coffee + caramel creamer, 1 glass kombucha  Exercise: 3x per week goes to gym 50 minutes on treadmill/bike  Retired    Colonoscopy: reports she had 2 WNL, advised follow up with PCP to discuss continued screening  Could consider cologuard if not interested in further colonoscopy    The following portions of the patient's history were reviewed and updated as appropriate: allergies, current medications, past family history, past medical history, past social history, past surgical history and problem list     Review of Systems   Constitutional: Negative for chills and fever  HENT: Positive for sinus pressure  Negative for sore throat  Respiratory: Negative for cough and shortness of breath  Cardiovascular: Negative for chest pain and palpitations  Gastrointestinal: Negative for abdominal pain, constipation, diarrhea and vomiting  Genitourinary: Negative for dysuria  Musculoskeletal: Negative for back pain  Skin: Negative for rash  Neurological: Negative for dizziness and headaches  Psychiatric/Behavioral:        Reports mood stable       Objective:    /68 (BP Location: Left arm, Patient Position: Sitting, Cuff Size: Large)   Pulse 76   Temp 99 5 °F (37 5 °C) (Tympanic)   Resp 17   Ht 5' 6" (1 676 m)   Wt 94 9 kg (209 lb 3 2 oz)   BMI 33 77 kg/m²     Physical Exam   Nursing note and vitals reviewed  Constitutional   General appearance: Abnormal   well developed and obese     Eyes No conjunctival pallor  Ears, Nose, Mouth, and Throat Oral mucosa moist    Pulmonary   Respiratory effort: No increased work of breathing or signs of respiratory distress  Auscultation of lungs: Clear to auscultation, equal breath sounds bilaterally, no wheezes, no rales, no rhonci  Cardiovascular   Auscultation of heart: Normal rate and rhythm, normal S1 and S2, without murmurs  Examination of extremities for edema and/or varicosities: Normal   no edema  Abdomen   Abdomen: Abnormal   The abdomen was obese  Bowel sounds were normal  The abdomen was soft and nontender     Musculoskeletal   Gait and station: Normal     Psychiatric   Orientation to person, place and time: Normal     Affect: appropriate

## 2020-02-28 NOTE — PROGRESS NOTES
Weight Management Medical Nutrition Assessment  Mariah presented for the New Start session with the [facility]   Today's weight is  210 7#  Per dietary recall patient consumes excess calories from large portion sizes and snacking at night  She generally eats well structured meals and chooses healthier foods  Explained that she needs to reduce portion sizes for weight loss  Reviewed the importance of tracking foods using luna or writing it down (food journal provided to her and )  Briefly reviewed myfitnesspal  Encouraged weighing/measuring foods  Pt seemed overwhelmed, support given  Encouraged her to take it day by day; that changes aren't made overnight  Recommending 900-1100 kcal/day  Reviewed tips for dining out since her and  go out several times per week       Patient seen by Medical Provider in past 6 months:  yes  Requested to schedule appointment with Medical Provider: No      Anthropometric Measurements  Start Weight (#): 210 7  Current Weight (#):  n/a  TBW % Change from start weight: n/a  Ideal Body Weight (#): 130#  Goal Weight (#):150-170#    Weight Loss History  Previous weight loss attempts: Commercial Programs ([employer], [employer], etc )  High Protein/Low CHO diets (Atkins, [employer], etc )  Meal Replacements (Medifast, Slim Fast, etc )    Food and Nutrition Related History  Wake up: 5-6am  Bed Time: 10-11pm    Food Recall  Breakfast: (8am): 1/2 protein bar and coffee; slice GF bread+almond butter  Snack: none  Lunch: (12pm): hummus with crackers; pickled egg; sardines; chicken salad; apple with PB for dessert  Snack: none   Dinner: (5-6 pm): chicken, sweet potato with salads; turkey, starch, green beans; grilled cheese sandwich with fries  Snack: kombutcha and GF pretzels    Beverages: water, sugar free beverages and coffee/tea (1 cup)  Volume of beverage intake: sometimes hardly any; 1 sobe water (20 oz), couple cups water/day, 2 cups coffee with creamers    Weekends: Worse, goes out for breakfast sunday morning  Cravings: sweet/salty  Trouble area of day: after dinner    Frequency of Eating out: 2-3x/wk  Food restrictions: gluten free  Cooking: self   Food Shopping: self    Physical Activity Intake  Activity: walks 1 mile (3x/wk); bikes  Frequency: 3x/wk  Physical limitations/barriers to exercise:  fibromyalgia    Estimated Needs  Energy  Bear Ally Energy Needs: BMR : 900-1100  1-2# loss weekly sedentary:  746-1246 kcal               1-2# loss weekly lightly active: 7523-6940 kcal  Protein: 71-89 grams      (1 2-1 5g/kg IBW)  Fluid:  2068 ml     (35mL/kg IBW)    Nutrition Diagnosis  Yes; Overweight/obesity  related to Excess energy intake as evidenced by  BMI more than normative standard for age and sex (obesity-grade I 26-30  9)       Nutrition Intervention    Nutrition Prescription  Calories: 900-1100  Protein: 71-89 grams  Fluid: 69 oz    Meal Plan (Deny/Pro/Carb)  Breakfast: 200/20  Snack:  Lunch: 340/20-30  Snack:  Dinner: 873-32/03-03  Snack: 100-200    Nutrition Education:    [facility]y [facility] Manual  Calorie controlled menu  Lean protein food choices  Healthy snack options  Food journaling tips      Nutrition Counseling:  Strategies: meal planning, portion sizes, healthy snack choices, hydration, fiber intake, protein intake, exercise, food journal      Monitoring and Evaluation:  Evaluation criteria:  Energy Intake  Meet protein needs  Maintain adequate hydration  Monitor weekly weight  Meal planning/preparation  Food journal   Decreased portions at mealtimes and snacks  Physical activity     Barriers to learning:none  Readiness to change: Preparation:  (Getting ready to change)   Comprehension: good  Expected Compliance: good

## 2020-03-03 ENCOUNTER — OFFICE VISIT (OUTPATIENT)
Dept: BARIATRICS | Facility: CLINIC | Age: 75
End: 2020-03-03

## 2020-03-03 VITALS — BODY MASS INDEX: 33.86 KG/M2 | HEIGHT: 66 IN | WEIGHT: 210.7 LBS

## 2020-03-03 DIAGNOSIS — R63.5 ABNORMAL WEIGHT GAIN: ICD-10-CM

## 2020-03-03 PROCEDURE — WMPRO12

## 2020-03-03 PROCEDURE — RECHECK

## 2020-04-27 ENCOUNTER — TELEPHONE (OUTPATIENT)
Dept: BARIATRICS | Facility: CLINIC | Age: 75
End: 2020-04-27

## 2020-04-27 ENCOUNTER — OFFICE VISIT (OUTPATIENT)
Dept: BARIATRICS | Facility: CLINIC | Age: 75
End: 2020-04-27

## 2020-04-27 DIAGNOSIS — R63.5 ABNORMAL WEIGHT GAIN: Primary | ICD-10-CM

## 2020-04-27 PROCEDURE — RECHECK

## 2020-04-28 ENCOUNTER — TELEPHONE (OUTPATIENT)
Dept: BARIATRICS | Facility: CLINIC | Age: 75
End: 2020-04-28

## 2020-05-18 ENCOUNTER — TRANSCRIBE ORDERS (OUTPATIENT)
Dept: LAB | Facility: CLINIC | Age: 75
End: 2020-05-18

## 2020-05-18 ENCOUNTER — LAB (OUTPATIENT)
Dept: LAB | Facility: CLINIC | Age: 75
End: 2020-05-18
Payer: MEDICARE

## 2020-05-18 DIAGNOSIS — E03.9 ACQUIRED HYPOTHYROIDISM: ICD-10-CM

## 2020-05-18 LAB — TSH SERPL DL<=0.05 MIU/L-ACNC: 1.25 UIU/ML (ref 0.36–3.74)

## 2020-05-18 PROCEDURE — 36415 COLL VENOUS BLD VENIPUNCTURE: CPT

## 2020-05-18 PROCEDURE — 84443 ASSAY THYROID STIM HORMONE: CPT

## 2020-06-26 DIAGNOSIS — E03.9 ACQUIRED HYPOTHYROIDISM: ICD-10-CM

## 2020-06-26 RX ORDER — LEVOTHYROXINE SODIUM 88 MCG
TABLET ORAL
Qty: 30 TABLET | Refills: 0 | OUTPATIENT
Start: 2020-06-26

## 2020-06-29 ENCOUNTER — PATIENT MESSAGE (OUTPATIENT)
Dept: FAMILY MEDICINE CLINIC | Facility: CLINIC | Age: 75
End: 2020-06-29

## 2020-06-29 RX ORDER — LEVOTHYROXINE SODIUM 88 UG/1
88 TABLET ORAL DAILY
Qty: 30 TABLET | Refills: 5 | Status: SHIPPED | OUTPATIENT
Start: 2020-06-29 | End: 2020-12-22 | Stop reason: DRUGHIGH

## 2020-07-07 ENCOUNTER — OFFICE VISIT (OUTPATIENT)
Dept: FAMILY MEDICINE CLINIC | Facility: CLINIC | Age: 75
End: 2020-07-07
Payer: MEDICARE

## 2020-07-07 VITALS
BODY MASS INDEX: 32.78 KG/M2 | TEMPERATURE: 98.1 F | SYSTOLIC BLOOD PRESSURE: 110 MMHG | WEIGHT: 204 LBS | DIASTOLIC BLOOD PRESSURE: 70 MMHG | HEART RATE: 70 BPM | HEIGHT: 66 IN | RESPIRATION RATE: 16 BRPM | OXYGEN SATURATION: 98 %

## 2020-07-07 DIAGNOSIS — M25.542 ARTHRALGIA OF BOTH HANDS: ICD-10-CM

## 2020-07-07 DIAGNOSIS — R73.01 IFG (IMPAIRED FASTING GLUCOSE): ICD-10-CM

## 2020-07-07 DIAGNOSIS — M20.031 SWAN-NECK DEFORMITY OF FINGER OF RIGHT HAND: ICD-10-CM

## 2020-07-07 DIAGNOSIS — E66.09 CLASS 1 OBESITY DUE TO EXCESS CALORIES WITH SERIOUS COMORBIDITY AND BODY MASS INDEX (BMI) OF 32.0 TO 32.9 IN ADULT: ICD-10-CM

## 2020-07-07 DIAGNOSIS — M79.7 FIBROMYALGIA: ICD-10-CM

## 2020-07-07 DIAGNOSIS — M25.541 ARTHRALGIA OF BOTH HANDS: ICD-10-CM

## 2020-07-07 DIAGNOSIS — E03.9 ACQUIRED HYPOTHYROIDISM: Primary | ICD-10-CM

## 2020-07-07 PROCEDURE — 1160F RVW MEDS BY RX/DR IN RCRD: CPT | Performed by: FAMILY MEDICINE

## 2020-07-07 PROCEDURE — 99214 OFFICE O/P EST MOD 30 MIN: CPT | Performed by: FAMILY MEDICINE

## 2020-07-07 PROCEDURE — 1036F TOBACCO NON-USER: CPT | Performed by: FAMILY MEDICINE

## 2020-07-07 PROCEDURE — 3008F BODY MASS INDEX DOCD: CPT | Performed by: FAMILY MEDICINE

## 2020-07-07 NOTE — PROGRESS NOTES
Assessment/Plan:  1  Acquired hypothyroidism  Update labs  She is concerned her last level was low    - TSH, 3rd generation with Free T4 reflex; Future    2  Class 1 obesity due to excess calories with serious comorbidity and body mass index (BMI) of 32 0 to 32 9 in adult  Encouraged diet and exercise to help for a healthier BMI  She has lost 6 pounds since last visit  3  IFG (impaired fasting glucose)  antipate improvement with diet changes  Will check labs before appointment in 6 months  4  Fibromyalgia  Pains flaring in hips  She has "failed" PT for this area in the past  She will continue to exercise  5  Arthralgia of both hands  At this time some mild pain and swelling  She will continue tumeric  Add glucosamine  Will call if she is interested in further treatment (hand surgeon for injections, etc)     6  [place]-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  See above  Return in about 6 months (around 1/17/2021) for Medicare Wellness Visit      Subjective:   Mariah is a 66 y o  female here today for a follow-up on her current medical conditions:  Patient Active Problem List   Diagnosis    Arthritis    Acquired hypothyroidism    Fibromyalgia    Costochondritis    Chronic bilateral thoracic back pain    IFG (impaired fasting glucose)    Irritable bowel syndrome with both constipation and diarrhea    History of basal cell carcinoma (BCC) of skin    Class 1 obesity due to excess calories with serious comorbidity and body mass index (BMI) of 32 0 to 32 9 in adult        Current Outpatient Medications   Medication Sig Dispense Refill    ACCU-CHEK SOFTCLIX LANCETS lancets by Other route daily Testing once daily 100 each 1    cholecalciferol (VITAMIN D3) 1,000 units tablet Take 3 tablets (3,000 Units total) by mouth daily Over the counter liquid 3000 iu  per day      glucose blood test strip 1 each by Other route daily Testing once daily   Accu Check Lashae Plus ALYCE BLOUNT test strips 100 each 1    levothyroxine 88 mcg tablet Take 1 tablet (88 mcg total) by mouth daily Brand necessary 30 tablet 5    Multiple Vitamins-Minerals (MULTIVITAMIN ADULT PO) Take by mouth      NON FORMULARY       NON FORMULARY Take 1 capsule by mouth daily Annabella Elias current facility-administered medications for this visit  HPI:  Chief Complaint   Patient presents with    Follow-up      thyroid & BP      -- Above per clinical staff and reviewed  --    PHQ-9 Depression Screening    PHQ-9:    Frequency of the following problems over the past two weeks:              MRI 4/18/19 posterior tibialis tendon tear left ankle  Jan 2020 labs homocystein 10 1, vit D 57 6 , CRP 5 3,   chol 205, TG 44, HDL 74,  ASCVD risk 19%   TSH 4 035  free T4 1 25   HbA1C 5 5 (from6  0) FBS 93, AST/ALT 18/21  Cr 0 77, GFR 75   last   visit referred to PT  Miralax recommended  mood reviewed - was thinking of a supplement for mood  consider Cymbalta   20 mg  ortho right ankle OA - PT ice orthics   Today:  Changing eating - has lost 6 pounds   Arthritis in her hand kicked up   Numb spot in hand and finger changing   crochjeting and that is hurting   IBS the saem  Upper back much better but now hips are bothersome  More trouble walking   Sleeps well   Not bothering her at night   Had PT many times for her hip and not getting better   Stretching hands to help   - can only take supplements for so ling   Tumeric for pain and complains of Q 10     POA daughter - will do what she wishes     The following portions of the patient's history were reviewed and updated as appropriate: allergies, current medications, past family history, past medical history, past social history, past surgical history and problem list     Objective:  Vitals:  /70   Pulse 70   Temp 98 1 °F (36 7 °C)   Resp 16   Ht 5' 6" (1 676 m)   Wt 92 5 kg (204 lb)   SpO2 98%   BMI 32 93 kg/m²    Wt Readings from Last 3 Encounters:   07/07/20 92 5 kg (204 lb)   03/03/20 95 6 kg (210 lb 11 2 oz)   02/27/20 94 9 kg (209 lb 3 2 oz)      BP Readings from Last 3 Encounters:   07/07/20 110/70   02/27/20 132/68   01/23/20 160/82        Review of Systems   She has no other concerns  No unexpected weight changes  No chest pain, SOB, or palpitations  No GERD  No changes in bladder  Usual constipation/diarrhea changes  Sleeping well  + mood changes - anxiety with COVID and isolation + joint pain hands        Physical Exam   Constitutional:  she appears well-developed and well-nourished  HENT: Head: Normocephalic  Neck: Neck supple  Cardiovascular: Normal rate, regular rhythm and normal heart sounds  Pulmonary/Chest: Effort normal and breath sounds normal  No wheezes, rales, or rhonchi  Abdominal: Soft  Bowel sounds are normal  There is no tenderness  No hepatosplenomegaly  Musculoskeletal: she exhibits no edema  Lymphadenopathy: she has no cervical adenopathy  Neurological: she is alert and oriented to person, place, and time  Skin: Skin is warm and dry  Psychiatric: she has a normal mood and affect   her behavior is normal  Thought content normal

## 2020-07-07 NOTE — PATIENT INSTRUCTIONS
Try glucosamine with chondroitin to help with your joint pains  Follow the directions on the bottle as to how to take this  Some formulas are once a day  Try this for at least a couple of months before you decide if this is working

## 2020-07-15 ENCOUNTER — LAB (OUTPATIENT)
Dept: LAB | Facility: CLINIC | Age: 75
End: 2020-07-15
Payer: MEDICARE

## 2020-07-15 DIAGNOSIS — E03.9 ACQUIRED HYPOTHYROIDISM: ICD-10-CM

## 2020-07-15 LAB — TSH SERPL DL<=0.05 MIU/L-ACNC: 1.61 UIU/ML (ref 0.36–3.74)

## 2020-07-15 PROCEDURE — 36415 COLL VENOUS BLD VENIPUNCTURE: CPT

## 2020-07-15 PROCEDURE — 84443 ASSAY THYROID STIM HORMONE: CPT

## 2020-07-17 NOTE — RESULT ENCOUNTER NOTE
Mariah,   Your results are back for the thyroid blood work  It is normal/well controlled  Let me know if you have any questions   Take care,   Matty Beard, DO

## 2020-10-27 DIAGNOSIS — R92.8 ABNORMAL MAMMOGRAM: ICD-10-CM

## 2020-10-27 DIAGNOSIS — N60.19 DIFFUSE CYSTIC MASTOPATHY OF UNSPECIFIED BREAST: ICD-10-CM

## 2020-10-27 DIAGNOSIS — N60.19 FIBROCYSTIC BREAST CHANGES: ICD-10-CM

## 2020-10-27 DIAGNOSIS — N60.19 FIBROCYSTIC DISEASE OF BREAST: Primary | ICD-10-CM

## 2020-11-10 ENCOUNTER — PATIENT MESSAGE (OUTPATIENT)
Dept: FAMILY MEDICINE CLINIC | Facility: CLINIC | Age: 75
End: 2020-11-10

## 2020-11-10 DIAGNOSIS — R73.01 IFG (IMPAIRED FASTING GLUCOSE): ICD-10-CM

## 2020-11-10 DIAGNOSIS — E03.9 ACQUIRED HYPOTHYROIDISM: Primary | ICD-10-CM

## 2020-11-10 DIAGNOSIS — R79.82 ELEVATED C-REACTIVE PROTEIN: ICD-10-CM

## 2020-11-10 DIAGNOSIS — K58.2 IRRITABLE BOWEL SYNDROME WITH BOTH CONSTIPATION AND DIARRHEA: ICD-10-CM

## 2020-11-10 DIAGNOSIS — M79.7 FIBROMYALGIA: ICD-10-CM

## 2020-11-10 DIAGNOSIS — Z78.0 POST-MENOPAUSAL: ICD-10-CM

## 2020-11-10 DIAGNOSIS — Z86.39 PERSONAL HISTORY OF OTHER ENDOCRINE, NUTRITIONAL AND METABOLIC DISEASE: ICD-10-CM

## 2020-11-17 ENCOUNTER — OFFICE VISIT (OUTPATIENT)
Dept: SURGERY | Facility: CLINIC | Age: 75
End: 2020-11-17
Payer: MEDICARE

## 2020-11-17 VITALS
BODY MASS INDEX: 33.91 KG/M2 | HEIGHT: 66 IN | SYSTOLIC BLOOD PRESSURE: 116 MMHG | DIASTOLIC BLOOD PRESSURE: 80 MMHG | HEART RATE: 78 BPM | WEIGHT: 211 LBS | TEMPERATURE: 98 F

## 2020-11-17 DIAGNOSIS — N60.19 FIBROCYSTIC BREAST DISEASE: Primary | ICD-10-CM

## 2020-11-17 PROCEDURE — 99213 OFFICE O/P EST LOW 20 MIN: CPT | Performed by: SURGERY

## 2020-11-19 ENCOUNTER — PATIENT MESSAGE (OUTPATIENT)
Dept: FAMILY MEDICINE CLINIC | Facility: CLINIC | Age: 75
End: 2020-11-19

## 2020-11-19 DIAGNOSIS — M19.079 FOOT JOINT DISORDER: ICD-10-CM

## 2020-11-19 DIAGNOSIS — M79.673 PAIN OF FOOT, UNSPECIFIED LATERALITY: Primary | ICD-10-CM

## 2020-12-16 ENCOUNTER — LAB (OUTPATIENT)
Dept: LAB | Facility: CLINIC | Age: 75
End: 2020-12-16
Payer: MEDICARE

## 2020-12-16 DIAGNOSIS — M79.7 FIBROMYALGIA: ICD-10-CM

## 2020-12-16 DIAGNOSIS — R79.82 ELEVATED C-REACTIVE PROTEIN: ICD-10-CM

## 2020-12-16 DIAGNOSIS — E03.9 ACQUIRED HYPOTHYROIDISM: ICD-10-CM

## 2020-12-16 DIAGNOSIS — K58.2 IRRITABLE BOWEL SYNDROME WITH BOTH CONSTIPATION AND DIARRHEA: ICD-10-CM

## 2020-12-16 DIAGNOSIS — R73.01 IFG (IMPAIRED FASTING GLUCOSE): ICD-10-CM

## 2020-12-16 DIAGNOSIS — Z78.0 POST-MENOPAUSAL: ICD-10-CM

## 2020-12-16 DIAGNOSIS — Z86.39 PERSONAL HISTORY OF OTHER ENDOCRINE, NUTRITIONAL AND METABOLIC DISEASE: ICD-10-CM

## 2020-12-16 LAB
25(OH)D3 SERPL-MCNC: 77.4 NG/ML (ref 30–100)
ALBUMIN SERPL BCP-MCNC: 3.4 G/DL (ref 3.5–5)
ALP SERPL-CCNC: 79 U/L (ref 46–116)
ALT SERPL W P-5'-P-CCNC: 25 U/L (ref 12–78)
ANION GAP SERPL CALCULATED.3IONS-SCNC: 8 MMOL/L (ref 4–13)
AST SERPL W P-5'-P-CCNC: 19 U/L (ref 5–45)
BASOPHILS # BLD AUTO: 0.08 THOUSANDS/ΜL (ref 0–0.1)
BASOPHILS NFR BLD AUTO: 1 % (ref 0–1)
BILIRUB SERPL-MCNC: 0.44 MG/DL (ref 0.2–1)
BUN SERPL-MCNC: 13 MG/DL (ref 5–25)
CALCIUM ALBUM COR SERPL-MCNC: 9.6 MG/DL (ref 8.3–10.1)
CALCIUM SERPL-MCNC: 9.1 MG/DL (ref 8.3–10.1)
CHLORIDE SERPL-SCNC: 107 MMOL/L (ref 100–108)
CHOLEST SERPL-MCNC: 202 MG/DL (ref 50–200)
CO2 SERPL-SCNC: 27 MMOL/L (ref 21–32)
CREAT SERPL-MCNC: 0.81 MG/DL (ref 0.6–1.3)
CRP SERPL QL: 6.6 MG/L
EOSINOPHIL # BLD AUTO: 0.25 THOUSAND/ΜL (ref 0–0.61)
EOSINOPHIL NFR BLD AUTO: 3 % (ref 0–6)
ERYTHROCYTE [DISTWIDTH] IN BLOOD BY AUTOMATED COUNT: 13.4 % (ref 11.6–15.1)
GFR SERPL CREATININE-BSD FRML MDRD: 70 ML/MIN/1.73SQ M
GLUCOSE P FAST SERPL-MCNC: 107 MG/DL (ref 65–99)
HCT VFR BLD AUTO: 42.6 % (ref 34.8–46.1)
HCYS SERPL-SCNC: 9.5 UMOL/L (ref 3.7–11.2)
HDLC SERPL-MCNC: 79 MG/DL
HGB BLD-MCNC: 13.8 G/DL (ref 11.5–15.4)
IMM GRANULOCYTES # BLD AUTO: 0.02 THOUSAND/UL (ref 0–0.2)
IMM GRANULOCYTES NFR BLD AUTO: 0 % (ref 0–2)
LDLC SERPL CALC-MCNC: 117 MG/DL (ref 0–100)
LYMPHOCYTES # BLD AUTO: 2.32 THOUSANDS/ΜL (ref 0.6–4.47)
LYMPHOCYTES NFR BLD AUTO: 31 % (ref 14–44)
MCH RBC QN AUTO: 30.5 PG (ref 26.8–34.3)
MCHC RBC AUTO-ENTMCNC: 32.4 G/DL (ref 31.4–37.4)
MCV RBC AUTO: 94 FL (ref 82–98)
MONOCYTES # BLD AUTO: 0.49 THOUSAND/ΜL (ref 0.17–1.22)
MONOCYTES NFR BLD AUTO: 7 % (ref 4–12)
NEUTROPHILS # BLD AUTO: 4.34 THOUSANDS/ΜL (ref 1.85–7.62)
NEUTS SEG NFR BLD AUTO: 58 % (ref 43–75)
NONHDLC SERPL-MCNC: 123 MG/DL
NRBC BLD AUTO-RTO: 0 /100 WBCS
PLATELET # BLD AUTO: 288 THOUSANDS/UL (ref 149–390)
PMV BLD AUTO: 9.7 FL (ref 8.9–12.7)
POTASSIUM SERPL-SCNC: 4.2 MMOL/L (ref 3.5–5.3)
PROT SERPL-MCNC: 7.2 G/DL (ref 6.4–8.2)
RBC # BLD AUTO: 4.53 MILLION/UL (ref 3.81–5.12)
SODIUM SERPL-SCNC: 142 MMOL/L (ref 136–145)
T3FREE SERPL-MCNC: 1.68 PG/ML (ref 2.3–4.2)
T4 FREE SERPL-MCNC: 1.22 NG/DL (ref 0.76–1.46)
TRIGL SERPL-MCNC: 32 MG/DL
TSH SERPL DL<=0.05 MIU/L-ACNC: 5.34 UIU/ML (ref 0.36–3.74)
WBC # BLD AUTO: 7.5 THOUSAND/UL (ref 4.31–10.16)

## 2020-12-16 PROCEDURE — 83090 ASSAY OF HOMOCYSTEINE: CPT

## 2020-12-16 PROCEDURE — 84481 FREE ASSAY (FT-3): CPT

## 2020-12-16 PROCEDURE — 36415 COLL VENOUS BLD VENIPUNCTURE: CPT

## 2020-12-16 PROCEDURE — 80061 LIPID PANEL: CPT

## 2020-12-16 PROCEDURE — 86140 C-REACTIVE PROTEIN: CPT

## 2020-12-16 PROCEDURE — 84443 ASSAY THYROID STIM HORMONE: CPT

## 2020-12-16 PROCEDURE — 80053 COMPREHEN METABOLIC PANEL: CPT

## 2020-12-16 PROCEDURE — 84439 ASSAY OF FREE THYROXINE: CPT

## 2020-12-16 PROCEDURE — 82306 VITAMIN D 25 HYDROXY: CPT

## 2020-12-16 PROCEDURE — 85025 COMPLETE CBC W/AUTO DIFF WBC: CPT

## 2021-01-04 DIAGNOSIS — R73.01 IFG (IMPAIRED FASTING GLUCOSE): ICD-10-CM

## 2021-01-04 RX ORDER — LANCETS
EACH MISCELLANEOUS DAILY
Qty: 100 EACH | Refills: 1 | Status: CANCELLED | OUTPATIENT
Start: 2021-01-04

## 2021-01-06 RX ORDER — LANCETS
EACH MISCELLANEOUS DAILY
Qty: 100 EACH | Refills: 3 | Status: SHIPPED | OUTPATIENT
Start: 2021-01-06

## 2021-01-06 NOTE — TELEPHONE ENCOUNTER
Medication refill request: testing strips and lancet   Last office visit: 7/7/20  Next office visit: 1/26/21  Last refilled: 9/27/19 #100x1  Pharmacy: [place] [id number]  Phone: [phone] Fax: [phone]      Pended: 100x3

## 2021-01-26 ENCOUNTER — OFFICE VISIT (OUTPATIENT)
Dept: FAMILY MEDICINE CLINIC | Facility: CLINIC | Age: 76
End: 2021-01-26
Payer: MEDICARE

## 2021-01-26 VITALS
TEMPERATURE: 98.2 F | HEART RATE: 86 BPM | WEIGHT: 212 LBS | SYSTOLIC BLOOD PRESSURE: 124 MMHG | BODY MASS INDEX: 34.07 KG/M2 | HEIGHT: 66 IN | RESPIRATION RATE: 16 BRPM | OXYGEN SATURATION: 97 % | DIASTOLIC BLOOD PRESSURE: 67 MMHG

## 2021-01-26 DIAGNOSIS — I73.9 PAD (PERIPHERAL ARTERY DISEASE) (HCC): ICD-10-CM

## 2021-01-26 DIAGNOSIS — M94.0 COSTOCHONDRITIS: ICD-10-CM

## 2021-01-26 DIAGNOSIS — E03.9 ACQUIRED HYPOTHYROIDISM: ICD-10-CM

## 2021-01-26 DIAGNOSIS — R73.01 IFG (IMPAIRED FASTING GLUCOSE): ICD-10-CM

## 2021-01-26 DIAGNOSIS — Z00.00 ENCOUNTER FOR MEDICARE ANNUAL WELLNESS EXAM: Primary | ICD-10-CM

## 2021-01-26 DIAGNOSIS — E66.09 CLASS 1 OBESITY DUE TO EXCESS CALORIES WITH SERIOUS COMORBIDITY AND BODY MASS INDEX (BMI) OF 34.0 TO 34.9 IN ADULT: ICD-10-CM

## 2021-01-26 DIAGNOSIS — M79.7 FIBROMYALGIA: ICD-10-CM

## 2021-01-26 PROCEDURE — G0438 PPPS, INITIAL VISIT: HCPCS | Performed by: FAMILY MEDICINE

## 2021-01-26 PROCEDURE — 99214 OFFICE O/P EST MOD 30 MIN: CPT | Performed by: FAMILY MEDICINE

## 2021-01-26 PROCEDURE — 1123F ACP DISCUSS/DSCN MKR DOCD: CPT | Performed by: FAMILY MEDICINE

## 2021-01-26 RX ORDER — CHOLECALCIFEROL (VITAMIN D3) 10(400)/ML
6000 DROPS ORAL DAILY
COMMUNITY

## 2021-01-26 RX ORDER — LEVOTHYROXINE SODIUM 0.1 MG/1
100 TABLET ORAL DAILY
COMMUNITY
Start: 2020-12-22 | End: 2021-02-18 | Stop reason: SDUPTHER

## 2021-01-26 NOTE — PROGRESS NOTES
Assessment and Plan:     Problem List Items Addressed This Visit        Endocrine    Acquired hypothyroidism    Relevant Medications    levothyroxine (Synthroid) 100 mcg tablet    IFG (impaired fasting glucose)       Other    Fibromyalgia    Class 1 obesity due to excess calories with serious comorbidity and body mass index (BMI) of 32 0 to 32 9 in adult      Other Visit Diagnoses     Encounter for Medicare annual wellness exam    -  Primary           Preventive health issues were discussed with patient, and age appropriate screening tests were ordered as noted in patient's After Visit Summary  Personalized health advice and appropriate referrals for health education or preventive services given if needed, as noted in patient's After Visit Summary  She declines vaccines   Confirmed her living will and wishes        History of Present Illness:     Patient presents for Medicare Annual Wellness visit    Patient Care Team:  Antonia Truong DO as PCP - General (Family Medicine)  Jaden Alfaro MD     Problem List:     Patient Active Problem List   Diagnosis    Arthritis    Acquired hypothyroidism    Fibromyalgia    Costochondritis    Chronic bilateral thoracic back pain    IFG (impaired fasting glucose)    Irritable bowel syndrome with both constipation and diarrhea    History of basal cell carcinoma (BCC) of skin    Class 1 obesity due to excess calories with serious comorbidity and body mass index (BMI) of 32 0 to 32 9 in adult    Fibrocystic breast disease      Past Medical and Surgical History:     Past Medical History:   Diagnosis Date    Arthritis     Basal cell carcinoma     Breast lump     Cancer ([address] )     Disease of thyroid gland     Kidney stone     Stone     Past Surgical History:   Procedure Laterality Date    BELPHAROPTOSIS REPAIR      BREAST LUMPECTOMY Left     BREAST SURGERY      Puncture aspiration of a cyst    COLONOSCOPY      JOINT REPLACEMENT      both hips    SKIN LESION EXCISION Back, Lt Face    TONSILLECTOMY      TOTAL HIP ARTHROPLASTY Bilateral     Impression 16    WISDOM TOOTH EXTRACTION        Family History:     Family History   Problem Relation Age of Onset    Diabetes Father     Hearing loss Father     Uterine cancer Family     Stroke Mother     Hearing loss Mother     Vision loss Mother     No Known Problems Brother     No Known Problems Daughter     Heart disease Neg Hx       Social History:     E-Cigarette/Vaping    E-Cigarette Use Never User      E-Cigarette/Vaping Substances    Nicotine No     THC No     CBD No     Flavoring No     Other No     Unknown No      Social History     Socioeconomic History    Marital status: /Civil Union     Spouse name: None    Number of children: 1    Years of education: None    Highest education level: None   Occupational History    None   Social Needs    Financial resource strain: None    Food insecurity     Worry: None     Inability: None    Transportation needs     Medical: None     Non-medical: None   Tobacco Use    Smoking status: Former Smoker     Packs/day: 1      Years: 6      Pack years: 6      Types: Cigarettes     Start date: 1960     Quit date: 1965     Years since quittin 1    Smokeless tobacco: Never Used    Tobacco comment: 8 years smoking per [place]   Substance and Sexual Activity    Alcohol use:  Yes     Alcohol/week: 0 0 standard drinks     Frequency: Monthly or less     Drinks per session: 1 or 2     Comment: wine with dinner occasionally    Drug use: No    Sexual activity: Yes     Partners: Male     Birth control/protection: None   Lifestyle    Physical activity     Days per week: None     Minutes per session: None    Stress: None   Relationships    Social connections     Talks on phone: None     Gets together: None     Attends Taoism service: None     Active member of club or organization: None     Attends meetings of clubs or organizations: None Relationship status: None    Intimate partner violence     Fear of current or ex partner: None     Emotionally abused: None     Physically abused: None     Forced sexual activity: None   Other Topics Concern    None   Social History Narrative    Lives with her  Niurka Zhang  > 45 years     3 children live locally - one hers, 2 her husbands (one son estranged for 25 years) - 8 grandchildren, 3 great grandchildren          Never a smoker - As per [employer]    No alcohol use - As per [employer]         Most recent tobacco use screenin2018    Advance directive: Yes 12/3/19    Live alone or with others: with others    Marital status:     Are you currently employed: No    Alcohol intake: None    Chewing tobacco: none    Per Melina      Medications and Allergies:     Current Outpatient Medications   Medication Sig Dispense Refill    Accu-Chek Softclix Lancets lancets Use daily Testing once daily 100 each 3    Cholecalciferol (Vitamin D3) 10 MCG/ML LIQD Take 6,000 Units by mouth daily      glucose blood test strip Use 1 each daily Testing once daily Accu Check Lashae Plus ALYCE BLOUNT test strips 100 each 3    levothyroxine (Synthroid) 100 mcg tablet Take 100 mcg by mouth daily      levothyroxine 100 mcg tablet Take 1 tablet (100 mcg total) by mouth daily in the early morning Brand necessary 90 tablet 3    Multiple Vitamins-Minerals (MULTIVITAMIN ADULT PO) Take by mouth      NON FORMULARY       NON FORMULARY Take 1 capsule by mouth daily Boluke        No current facility-administered medications for this visit  Allergies   Allergen Reactions    Sulfa Antibiotics Shortness Of Breath      Immunizations: There is no immunization history on file for this patient     Health Maintenance:         Topic Date Due    Cervical Cancer Screening  2023    DXA SCAN  2025         Topic Date Due    DTaP,Tdap,and Td Vaccines (1 - Tdap) 1966    Pneumococcal Vaccine: 65+ Years (1 of 1 - PPSV23) 07/08/2008    Influenza Vaccine (1) 09/01/2020      Medicare Health Risk Assessment:     There were no vitals taken for this visit  Mariah is here for her Subsequent Wellness visit  Health Risk Assessment:   Patient rates overall health as very good  Patient feels that their physical health rating is same  Eyesight was rated as same  Hearing was rated as same  Patient feels that their emotional and mental health rating is same  Pain experienced in the last 7 days has been some  Patient's pain rating has been 5/10  Patient states that she has experienced no weight loss or gain in last 6 months  Depression Screening:   PHQ-2 Score: 0      Fall Risk Screening: In the past year, patient has experienced: no history of falling in past year      Urinary Incontinence Screening:   Patient has not leaked urine accidently in the last six months  Home Safety:  Patient does not have trouble with stairs inside or outside of their home  Patient has working smoke alarms and has working carbon monoxide detector  Home safety hazards include: none  Nutrition:   Current diet is Regular  Medications:   Patient is currently taking over-the-counter supplements  OTC medications include: see medication list  Patient is able to manage medications  Activities of Daily Living (ADLs)/Instrumental Activities of Daily Living (IADLs):   Walk and transfer into and out of bed and chair?: Yes  Dress and groom yourself?: Yes    Bathe or shower yourself?: Yes    Feed yourself?  Yes  Do your laundry/housekeeping?: Yes  Manage your money, pay your bills and track your expenses?: Yes  Make your own meals?: Yes    Do your own shopping?: Yes    Previous Hospitalizations:   Any hospitalizations or ED visits within the last 12 months?: No      Advance Care Planning:   Living will: Yes    Advanced directive: Yes      Comments: Medical Oma Mins, if there is hope do something, if there is no hope DNR     Cognitive Screening:   Provider or family/friend/caregiver concerned regarding cognition?: No    PREVENTIVE SCREENINGS      Cardiovascular Screening:    General: Screening Current      Diabetes Screening:     General: Screening Current      Colorectal Cancer Screening:     General: Screening Not Indicated      Breast Cancer Screening:     General: Screening Not Indicated      Cervical Cancer Screening:    General: Screening Not Indicated      Osteoporosis Screening:    General: Screening Current      Abdominal Aortic Aneurysm (AAA) Screening:        General: Screening Not Indicated      Lung Cancer Screening:     General: Screening Not Indicated      Hepatitis C Screening:    General: Patient Declines      Mariah Falls, DO

## 2021-01-26 NOTE — PROGRESS NOTES
Assessment/Plan:  1  Encounter for Medicare annual wellness exam  See other ntoe     2  Acquired hypothyroidism  Dose was recently adjusted - due to have it rechecked next month  She feels that her dose will need to be further adjusted  3  IFG (impaired fasting glucose)  Encouraged watching diet and watching carbs  She feels she is doing the best she can  Offered referral to dietician - she declined  - Hemoglobin A1C; Future    4  Class 1 obesity due to excess calories with serious comorbidity and body mass index (BMI) of 34 0 to 34 9 in adult  Encouraged diet and exercise to help for a healthier BMI  5  Fibromyalgia  Continues with many aches and pains  6  PAD (peripheral artery disease) (HCC)  Stable     7  Costochondritis  Continues with ongoing pain from this  Worsens in stressful situations  She does feel her stress is controlled with her supplement  No follow-ups on file      Subjective:   Mariah is a 66 y o  female here today for a follow-up on her current medical conditions:  Patient Active Problem List   Diagnosis    Arthritis    Acquired hypothyroidism    Fibromyalgia    Costochondritis    Chronic bilateral thoracic back pain    IFG (impaired fasting glucose)    Irritable bowel syndrome with both constipation and diarrhea    History of basal cell carcinoma (BCC) of skin    Class 1 obesity due to excess calories with serious comorbidity and body mass index (BMI) of 32 0 to 32 9 in adult    Fibrocystic breast disease        Current Outpatient Medications   Medication Sig Dispense Refill    Accu-Chek Softclix Lancets lancets Use daily Testing once daily 100 each 3    cholecalciferol (VITAMIN D3) 1,000 units tablet Take 3 tablets (3,000 Units total) by mouth daily Over the counter liquid 3000 iu  per day      glucose blood test strip Use 1 each daily Testing once daily Accu Check Lashae Plus ALYCE BLOUNT test strips 100 each 3    levothyroxine 100 mcg tablet Take 1 tablet (100 mcg total) by mouth daily in the early morning Brand necessary 90 tablet 3    Multiple Vitamins-Minerals (MULTIVITAMIN ADULT PO) Take by mouth      NON FORMULARY       NON FORMULARY Take 1 capsule by mouth daily Annabella        No current facility-administered medications for this visit  HPI:  No chief complaint on file  -- Above per clinical staff and reviewed  --    PHQ-9 Depression Screening    PHQ-9:   Frequency of the following problems over the past two weeks:      Little interest or pleasure in doing things: 0 - not at all  Feeling down, depressed, or hopeless: 0 - not at all         adjusted meds 12/17/20 up to 100 mg levo   Jan 2020 labs homocystein 10 1, vit D 57 6 , CRP 5 3,   chol 205, TG 44, HDL 74,  ASCVD risk 19%   TSH 4 035  free T4 1 25   HbA1C 5 5 (from 6 0) FBS 93, AST/ALT 18/21  Cr 0 77, GFR 75     last visit referred to PT  Miralax recommended  mood reviewed - was thinking of a supplement for mood  consider Cymbalta  20 mg  ortho right ankle OA - PT ice orthics   Today:  Not able to lose weight   Right thumb nail   costocondritis   Anxiety up with   Heart skips beat with hiatal hernia   heart rate has never gone up over 100   Gets a knot   Gym 3 times a week - walks 1 5 miles 30 min, bike 5 miles 30 min  Takes [employer] supplement as needed and that helps   Sees chiropractor   Sometimes hard to expand in the lungs  Likes weights also     Her sugar gets low     The following portions of the patient's history were reviewed and updated as appropriate: allergies, current medications, past family history, past medical history, past social history, past surgical history and problem list     Objective:  Vitals: There were no vitals taken for this visit     Wt Readings from Last 3 Encounters:   11/17/20 95 7 kg (211 lb)   07/07/20 92 5 kg (204 lb)   03/03/20 95 6 kg (210 lb 11 2 oz)      BP Readings from Last 3 Encounters:   11/17/20 116/80   07/07/20 110/70   02/27/20 132/68 Review of Systems   She has no other concerns  + unexpected weight changes - does not understand why she is gaining  + muscular chest pain, occasional shortness of breath and palpitations and as above  No GERD  No changes in bowels or bladder  Sleeping well  + mood changes  Physical Exam   Constitutional:  she appears well-developed and well-nourished  Obese  HENT: Head: Normocephalic  Neck: Neck supple  Cardiovascular: Normal rate, regular rhythm and normal heart sounds  Pulmonary/Chest: Effort normal and breath sounds normal  No wheezes, rales, or rhonchi  Abdominal: Soft  Bowel sounds are normal  There is no tenderness  No hepatosplenomegaly  Musculoskeletal: she exhibits no edema  Lymphadenopathy: she has no cervical adenopathy  Neurological: she is alert and oriented to person, place, and time  Skin: Skin is warm and dry  Psychiatric: she has a normal mood and affect   her behavior is normal  Thought content normal

## 2021-01-26 NOTE — PATIENT INSTRUCTIONS
Medicare Preventive Visit Patient Instructions  Thank you for completing your Welcome to Medicare Visit or Medicare Annual Wellness Visit today  Your next wellness visit will be due in one year (1/26/2022)  The screening/preventive services that you may require over the next 5-10 years are detailed below  Some tests may not apply to you based off risk factors and/or age  Screening tests ordered at today's visit but not completed yet may show as past due  Also, please note that scanned in results may not display below  Preventive Screenings:  Service Recommendations Previous Testing/Comments   Colorectal Cancer Screening  * Colonoscopy    * Fecal Occult Blood Test (FOBT)/Fecal Immunochemical Test (FIT)  * Fecal DNA/Cologuard Test  * Flexible Sigmoidoscopy Age: 54-65 years old   Colonoscopy: every 10 years (may be performed more frequently if at higher risk)  OR  FOBT/FIT: every 1 year  OR  Cologuard: every 3 years  OR  Sigmoidoscopy: every 5 years  Screening may be recommended earlier than age 48 if at higher risk for colorectal cancer  Also, an individualized decision between you and your healthcare provider will decide whether screening between the ages of 74-80 would be appropriate  Colonoscopy: Not on file  FOBT/FIT: Not on file  Cologuard: Not on file  Sigmoidoscopy: Not on file         Breast Cancer Screening Age: 36 years old  Frequency: every 1-2 years  Not required if history of left and right mastectomy Mammogram: Not on file       Cervical Cancer Screening Between the ages of 21-29, pap smear recommended once every 3 years  Between the ages of 33-67, can perform pap smear with HPV co-testing every 5 years     Recommendations may differ for women with a history of total hysterectomy, cervical cancer, or abnormal pap smears in past  Pap Smear: 03/01/2018    Screening Not Indicated   Hepatitis C Screening Once for adults born between 1945 and 1965  More frequently in patients at high risk for Hepatitis C Hep C Antibody: Not on file       Diabetes Screening 1-2 times per year if you're at risk for diabetes or have pre-diabetes Fasting glucose: 107 mg/dL   A1C: 5 5 %    Screening Current   Cholesterol Screening Once every 5 years if you don't have a lipid disorder  May order more often based on risk factors  Lipid panel: 12/16/2020    Screening Current     Other Preventive Screenings Covered by Medicare:  1  Abdominal Aortic Aneurysm (AAA) Screening: covered once if your at risk  You're considered to be at risk if you have a family history of AAA  2  Lung Cancer Screening: covers low dose CT scan once per year if you meet all of the following conditions: (1) Age 50-69; (2) No signs or symptoms of lung cancer; (3) Current smoker or have quit smoking within the last 15 years; (4) You have a tobacco smoking history of at least 30 pack years (packs per day multiplied by number of years you smoked); (5) You get a written order from a healthcare provider  3  Glaucoma Screening: covered annually if you're considered high risk: (1) You have diabetes OR (2) Family history of glaucoma OR (3)  aged 48 and older OR (3)  American aged 72 and older  3  Osteoporosis Screening: covered every 2 years if you meet one of the following conditions: (1) You're estrogen deficient and at risk for osteoporosis based off medical history and other findings; (2) Have a vertebral abnormality; (3) On glucocorticoid therapy for more than 3 months; (4) Have primary hyperparathyroidism; (5) On osteoporosis medications and need to assess response to drug therapy  · Last bone density test (DXA Scan): 02/12/2020   5  HIV Screening: covered annually if you're between the age of 15-65  Also covered annually if you are younger than 13 and older than 72 with risk factors for HIV infection  For pregnant patients, it is covered up to 3 times per pregnancy      Immunizations:  Immunization Recommendations   Influenza Vaccine Annual influenza vaccination during flu season is recommended for all persons aged >= 6 months who do not have contraindications   Pneumococcal Vaccine (Prevnar and Pneumovax)  * Prevnar = PCV13  * Pneumovax = PPSV23   Adults 25-60 years old: 1-3 doses may be recommended based on certain risk factors  Adults 72 years old: Prevnar (PCV13) vaccine recommended followed by Pneumovax (PPSV23) vaccine  If already received PPSV23 since turning 65, then PCV13 recommended at least one year after PPSV23 dose  Hepatitis B Vaccine 3 dose series if at intermediate or high risk (ex: diabetes, end stage renal disease, liver disease)   Tetanus (Td) Vaccine - COST NOT COVERED BY MEDICARE PART B Following completion of primary series, a booster dose should be given every 10 years to maintain immunity against tetanus  Td may also be given as tetanus wound prophylaxis  Tdap Vaccine - COST NOT COVERED BY MEDICARE PART B Recommended at least once for all adults  For pregnant patients, recommended with each pregnancy  Shingles Vaccine (Shingrix) - COST NOT COVERED BY MEDICARE PART B  2 shot series recommended in those aged 48 and above     Health Maintenance Due:      Topic Date Due    Cervical Cancer Screening  03/01/2023    DXA SCAN  02/12/2025     Immunizations Due:  There are no preventive care reminders to display for this patient  Advance Directives   What are advance directives? Advance directives are legal documents that state your wishes and plans for medical care  These plans are made ahead of time in case you lose your ability to make decisions for yourself  Advance directives can apply to any medical decision, such as the treatments you want, and if you want to donate organs  What are the types of advance directives? There are many types of advance directives, and each state has rules about how to use them  You may choose a combination of any of the following:  · Living will:   This is a written record of the treatment you want  You can also choose which treatments you do not want, which to limit, and which to stop at a certain time  This includes surgery, medicine, IV fluid, and tube feedings  · Durable power of  for healthcare [facility]): This is a written record that states who you want to make healthcare choices for you when you are unable to make them for yourself  This person, called a proxy, is usually a family member or a friend  You may choose more than 1 proxy  · Do not resuscitate (DNR) order:  A DNR order is used in case your heart stops beating or you stop breathing  It is a request not to have certain forms of treatment, such as CPR  A DNR order may be included in other types of advance directives  · Medical directive: This covers the care that you want if you are in a coma, near death, or unable to make decisions for yourself  You can list the treatments you want for each condition  Treatment may include pain medicine, surgery, blood transfusions, dialysis, IV or tube feedings, and a ventilator (breathing machine)  · Values history: This document has questions about your views, beliefs, and how you feel and think about life  This information can help others choose the care that you would choose  Why are advance directives important? An advance directive helps you control your care  Although spoken wishes may be used, it is better to have your wishes written down  Spoken wishes can be misunderstood, or not followed  Treatments may be given even if you do not want them  An advance directive may make it easier for your family to make difficult choices about your care  Weight Management   Why it is important to manage your weight:  Being overweight increases your risk of health conditions such as heart disease, high blood pressure, type 2 diabetes, and certain types of cancer  It can also increase your risk for osteoarthritis, sleep apnea, and other respiratory problems   Aim for a slow, steady weight loss  Even a small amount of weight loss can lower your risk of health problems  How to lose weight safely:  A safe and healthy way to lose weight is to eat fewer calories and get regular exercise  You can lose up about 1 pound a week by decreasing the number of calories you eat by 500 calories each day  Healthy meal plan for weight management:  A healthy meal plan includes a variety of foods, contains fewer calories, and helps you stay healthy  A healthy meal plan includes the following:  · Eat whole-grain foods more often  A healthy meal plan should contain fiber  Fiber is the part of grains, fruits, and vegetables that is not broken down by your body  Whole-grain foods are healthy and provide extra fiber in your diet  Some examples of whole-grain foods are whole-wheat breads and pastas, oatmeal, brown rice, and bulgur  · Eat a variety of vegetables every day  Include dark, leafy greens such as spinach, kale, ganga greens, and mustard greens  Eat yellow and orange vegetables such as carrots, sweet potatoes, and winter squash  · Eat a variety of fruits every day  Choose fresh or canned fruit (canned in its own juice or light syrup) instead of juice  Fruit juice has very little or no fiber  · Eat low-fat dairy foods  Drink fat-free (skim) milk or 1% milk  Eat fat-free yogurt and low-fat cottage cheese  Try low-fat cheeses such as mozzarella and other reduced-fat cheeses  · Choose meat and other protein foods that are low in fat  Choose beans or other legumes such as split peas or lentils  Choose fish, skinless poultry (chicken or turkey), or lean cuts of red meat (beef or pork)  Before you cook meat or poultry, cut off any visible fat  · Use less fat and oil  Try baking foods instead of frying them  Add less fat, such as margarine, sour cream, regular salad dressing and mayonnaise to foods  Eat fewer high-fat foods   Some examples of high-fat foods include french fries, doughnuts, ice cream, and cakes   · Eat fewer sweets  Limit foods and drinks that are high in sugar  This includes candy, cookies, regular soda, and sweetened drinks  Exercise:  Exercise at least 30 minutes per day on most days of the week  Some examples of exercise include walking, biking, dancing, and swimming  You can also fit in more physical activity by taking the stairs instead of the elevator or parking farther away from stores  Ask your healthcare provider about the best exercise plan for you  © Copyright [employer] 2018 Information is for End User's use only and may not be sold, redistributed or otherwise used for commercial purposes   All illustrations and images included in CareNotes® are the copyrighted property of [employer]  or [address] [place] [address]

## 2021-02-12 DIAGNOSIS — Z23 ENCOUNTER FOR IMMUNIZATION: ICD-10-CM

## 2021-02-16 ENCOUNTER — LAB (OUTPATIENT)
Dept: LAB | Facility: CLINIC | Age: 76
End: 2021-02-16
Payer: MEDICARE

## 2021-02-16 ENCOUNTER — TRANSCRIBE ORDERS (OUTPATIENT)
Dept: LAB | Facility: CLINIC | Age: 76
End: 2021-02-16

## 2021-02-16 ENCOUNTER — TELEPHONE (OUTPATIENT)
Dept: FAMILY MEDICINE CLINIC | Facility: CLINIC | Age: 76
End: 2021-02-16

## 2021-02-16 DIAGNOSIS — R73.01 IFG (IMPAIRED FASTING GLUCOSE): ICD-10-CM

## 2021-02-16 DIAGNOSIS — E03.9 ACQUIRED HYPOTHYROIDISM: ICD-10-CM

## 2021-02-16 LAB
EST. AVERAGE GLUCOSE BLD GHB EST-MCNC: 114 MG/DL
HBA1C MFR BLD: 5.6 %
TSH SERPL DL<=0.05 MIU/L-ACNC: 1.61 UIU/ML (ref 0.36–3.74)

## 2021-02-16 PROCEDURE — 83036 HEMOGLOBIN GLYCOSYLATED A1C: CPT

## 2021-02-16 PROCEDURE — 36415 COLL VENOUS BLD VENIPUNCTURE: CPT

## 2021-02-16 PROCEDURE — 84443 ASSAY THYROID STIM HORMONE: CPT

## 2021-02-16 NOTE — TELEPHONE ENCOUNTER
PATIENT RECEIVED TSH RESULTS ( NORMAL ) AND WANT TO KNOW IF DOSE WILL REMAIN THE SAME AS IT IS PENDING PICK-UP AT PHARMACY  THANK YOU

## 2021-02-18 DIAGNOSIS — E03.9 ACQUIRED HYPOTHYROIDISM: ICD-10-CM

## 2021-02-18 RX ORDER — LEVOTHYROXINE SODIUM 100 MCG
100 TABLET ORAL DAILY
Qty: 90 TABLET | Refills: 1 | Status: SHIPPED | OUTPATIENT
Start: 2021-02-18 | End: 2021-07-29

## 2021-07-08 ENCOUNTER — APPOINTMENT (OUTPATIENT)
Dept: LAB | Facility: CLINIC | Age: 76
End: 2021-07-08
Payer: MEDICARE

## 2021-07-08 DIAGNOSIS — E66.01 MORBID OBESITY (HCC): ICD-10-CM

## 2021-07-08 DIAGNOSIS — E03.9 HYPOTHYROIDISM, ADULT: ICD-10-CM

## 2021-07-08 LAB
ALBUMIN SERPL BCP-MCNC: 2.6 G/DL (ref 3.5–5)
ALP SERPL-CCNC: 81 U/L (ref 46–116)
ALT SERPL W P-5'-P-CCNC: 21 U/L (ref 12–78)
ANION GAP SERPL CALCULATED.3IONS-SCNC: 5 MMOL/L (ref 4–13)
AST SERPL W P-5'-P-CCNC: 18 U/L (ref 5–45)
BILIRUB SERPL-MCNC: 0.39 MG/DL (ref 0.2–1)
BUN SERPL-MCNC: 14 MG/DL (ref 5–25)
CALCIUM ALBUM COR SERPL-MCNC: 10.2 MG/DL (ref 8.3–10.1)
CALCIUM SERPL-MCNC: 9.1 MG/DL (ref 8.3–10.1)
CHLORIDE SERPL-SCNC: 107 MMOL/L (ref 100–108)
CO2 SERPL-SCNC: 29 MMOL/L (ref 21–32)
CREAT SERPL-MCNC: 0.78 MG/DL (ref 0.6–1.3)
GFR SERPL CREATININE-BSD FRML MDRD: 73 ML/MIN/1.73SQ M
GLUCOSE P FAST SERPL-MCNC: 102 MG/DL (ref 65–99)
POTASSIUM SERPL-SCNC: 4.2 MMOL/L (ref 3.5–5.3)
PROT SERPL-MCNC: 7.6 G/DL (ref 6.4–8.2)
SODIUM SERPL-SCNC: 141 MMOL/L (ref 136–145)
T3FREE SERPL-MCNC: 3.12 PG/ML (ref 2.3–4.2)
T4 FREE SERPL-MCNC: 1.13 NG/DL (ref 0.76–1.46)
TSH SERPL DL<=0.05 MIU/L-ACNC: 0.07 UIU/ML (ref 0.36–3.74)

## 2021-07-08 PROCEDURE — 84439 ASSAY OF FREE THYROXINE: CPT

## 2021-07-08 PROCEDURE — 80053 COMPREHEN METABOLIC PANEL: CPT

## 2021-07-08 PROCEDURE — 84481 FREE ASSAY (FT-3): CPT

## 2021-07-08 PROCEDURE — 84432 ASSAY OF THYROGLOBULIN: CPT

## 2021-07-08 PROCEDURE — 84443 ASSAY THYROID STIM HORMONE: CPT

## 2021-07-08 PROCEDURE — 86376 MICROSOMAL ANTIBODY EACH: CPT

## 2021-07-08 PROCEDURE — 36415 COLL VENOUS BLD VENIPUNCTURE: CPT

## 2021-07-08 PROCEDURE — 86800 THYROGLOBULIN ANTIBODY: CPT

## 2021-07-09 LAB — THYROPEROXIDASE AB SERPL-ACNC: 91 IU/ML (ref 0–34)

## 2021-07-13 LAB
THYROGLOB AB SERPL-ACNC: 66.1 IU/ML (ref 0–0.9)
THYROGLOB SERPL-MCNC: 9.8 NG/ML

## 2021-07-24 PROBLEM — Z28.21 NO VACCINATION-PT REFUSE: Status: ACTIVE | Noted: 2021-07-24

## 2021-07-27 RX ORDER — LEVOTHYROXINE SODIUM 200 MCG
200 TABLET ORAL DAILY
COMMUNITY
Start: 2021-06-04 | End: 2021-07-29

## 2021-07-29 ENCOUNTER — OFFICE VISIT (OUTPATIENT)
Dept: FAMILY MEDICINE CLINIC | Facility: CLINIC | Age: 76
End: 2021-07-29
Payer: MEDICARE

## 2021-07-29 VITALS
HEART RATE: 86 BPM | SYSTOLIC BLOOD PRESSURE: 130 MMHG | DIASTOLIC BLOOD PRESSURE: 74 MMHG | HEIGHT: 66 IN | OXYGEN SATURATION: 100 % | TEMPERATURE: 99.1 F | WEIGHT: 203.2 LBS | BODY MASS INDEX: 32.66 KG/M2 | RESPIRATION RATE: 14 BRPM

## 2021-07-29 DIAGNOSIS — M94.0 COSTOCHONDRITIS: ICD-10-CM

## 2021-07-29 DIAGNOSIS — R73.01 IFG (IMPAIRED FASTING GLUCOSE): ICD-10-CM

## 2021-07-29 DIAGNOSIS — Z11.59 NEED FOR HEPATITIS C SCREENING TEST: ICD-10-CM

## 2021-07-29 DIAGNOSIS — G89.29 CHRONIC BILATERAL THORACIC BACK PAIN: ICD-10-CM

## 2021-07-29 DIAGNOSIS — E66.09 CLASS 1 OBESITY DUE TO EXCESS CALORIES WITH SERIOUS COMORBIDITY AND BODY MASS INDEX (BMI) OF 32.0 TO 32.9 IN ADULT: ICD-10-CM

## 2021-07-29 DIAGNOSIS — M79.7 FIBROMYALGIA: ICD-10-CM

## 2021-07-29 DIAGNOSIS — E88.09 HYPOALBUMINEMIA: ICD-10-CM

## 2021-07-29 DIAGNOSIS — Z28.21 NO VACCINATION-PT REFUSE: ICD-10-CM

## 2021-07-29 DIAGNOSIS — E03.9 ACQUIRED HYPOTHYROIDISM: Primary | ICD-10-CM

## 2021-07-29 DIAGNOSIS — M54.6 CHRONIC BILATERAL THORACIC BACK PAIN: ICD-10-CM

## 2021-07-29 PROCEDURE — 99214 OFFICE O/P EST MOD 30 MIN: CPT | Performed by: FAMILY MEDICINE

## 2021-07-29 RX ORDER — LEVOTHYROXINE SODIUM 200 MCG
100 TABLET ORAL DAILY
Start: 2021-07-29 | End: 2022-02-03

## 2021-07-29 RX ORDER — LIOTHYRONINE SODIUM 25 UG/1
12 TABLET ORAL DAILY
Start: 2021-07-29 | End: 2022-08-04

## 2021-07-29 NOTE — PROGRESS NOTES
Assessment/Plan:  1  Acquired hypothyroidism  Pt now seeing Dr Anabella Chanel for this (another family doctor)  He has started her on cytomel  We reviewed pros and cons of this  She feels much better on this and wishes to keep taking it  He will update labs again in 4 weeks or so  - liothyronine (CYTOMEL) 25 mcg tablet; Take 0 5 tablets (12 5 mcg total) by mouth daily  - Synthroid 200 MCG tablet; Take 0 5 tablets (100 mcg total) by mouth daily    2  Fibromyalgia  Stable  Follows with chiropractor  3  IFG (impaired fasting glucose)  Update A1C with next labs     4  Chronic bilateral thoracic back pain  Stable  chirpractor believes a few ribs are out of alignment  5  Class 1 obesity due to excess calories with serious comorbidity and body mass index (BMI) of 32 0 to 32 9 in adult  Encouraged diet and exercise to help for a healthier BMI  Error - pt does not have this     7  Costochondritis  See above     8  No vaccination-pt refuse  decliens all vaccines     9  Need for hepatitis C screening test  Update with next labs   - Hepatitis C antibody; Future    10  Hypoalbuminemia  Reviewed high corrected calcium and low albumin   Repeat with next labs   - Calcium, ionized; Future  - Prealbumin; Future    Return in about 6 months (around 1/29/2022) for CC      Subjective:   Mariah is a 64 y o  female here today for a follow-up on her current medical conditions:  Patient Active Problem List   Diagnosis    Arthritis    Acquired hypothyroidism    Fibromyalgia    Costochondritis    Chronic bilateral thoracic back pain    IFG (impaired fasting glucose)    Irritable bowel syndrome with both constipation and diarrhea    History of basal cell carcinoma (BCC) of skin    Class 1 obesity due to excess calories with serious comorbidity and body mass index (BMI) of 32 0 to 32 9 in adult    Fibrocystic breast disease    No vaccination-pt refuse        Current Outpatient Medications   Medication Sig Dispense Refill    Accu-Chek Softclix Lancets lancets Use daily Testing once daily 100 each 3    Cholecalciferol (Vitamin D3) 10 MCG/ML LIQD Take 6,000 Units by mouth daily      glucose blood test strip Use 1 each daily Testing once daily Accu Check Lashae Plus ALYCE BLOUNT test strips 100 each 3    Multiple Vitamins-Minerals (MULTIVITAMIN ADULT PO) Take by mouth      NON FORMULARY       NON FORMULARY Take 1 capsule by mouth daily Boluke       Synthroid 200 MCG tablet Take 0 5 tablets (100 mcg total) by mouth daily      liothyronine (CYTOMEL) 25 mcg tablet Take 0 5 tablets (12 5 mcg total) by mouth daily       No current facility-administered medications for this visit  HPI:  Chief Complaint   Patient presents with    Follow-up     NO CONCERNS      -- Above per clinical staff and reviewed  --    PHQ-9 Depression Screening    PHQ-9:   Frequency of the following problems over the past two weeks:      Little interest or pleasure in doing things: 0 - not at all  Feeling down, depressed, or hopeless: 0 - not at all  PHQ-2 Score: 0          Jul y labs in chart from Dr Andreea Bolivar - fbs 102, Jose calcium 10 2  adjusted meds 12/17/20 up to 100 mg levo   TSH to be recehcked 2/16/21 - ordered   last A1C 2/2021, lipids 12/2020   last visit referred to PT  Miralax recommended  mood reviewed - was thin  paula of a supplement for mood  consider Cymbalta   20 mg  ortho right ankle OA - PT ice orthics   Today:  Now seeing Dr Sophia Brown - he is treating her T3  She has been on T3 for a month before 2 months to recheck   Feels better with her mood and has been easier to lose weight   Not vaccinated so staying home often   Since on T3 no gurglings and skipped beats   Chiropractor told her 3 ribs are out       The following portions of the patient's history were reviewed and updated as appropriate: allergies, current medications, past family history, past medical history, past social history, past surgical history and problem list     Objective:  Vitals:  /74   Pulse 86   Temp 99 1 °F (37 3 °C)   Resp 14   Ht 5' 6" (1 676 m)   Wt 92 2 kg (203 lb 3 2 oz)   SpO2 100%   BMI 32 80 kg/m²    Wt Readings from Last 3 Encounters:   07/29/21 92 2 kg (203 lb 3 2 oz)   01/26/21 96 2 kg (212 lb)   11/17/20 95 7 kg (211 lb)      BP Readings from Last 3 Encounters:   07/29/21 130/74   01/26/21 124/67   11/17/20 116/80        Review of Systems   She has no other concerns  No unexpected weight changes  No chest pain, SOB, or palpitations  No GERD  No changes in bowels or bladder  Sleeping well  No mood changes  Physical Exam   Constitutional:  she appears well-developed and well-nourished  HENT: Head: Normocephalic  Neck: Neck supple  Cardiovascular: Normal rate, regular rhythm and normal heart sounds  Pulmonary/Chest: Effort normal and breath sounds normal  No wheezes, rales, or rhonchi  Abdominal: Soft  Bowel sounds are normal  There is no tenderness  No hepatosplenomegaly  Musculoskeletal: she exhibits no edema  Lymphadenopathy: she has no cervical adenopathy  Neurological: she is alert and oriented to person, place, and time  Skin: Skin is warm and dry  Psychiatric: she has a normal mood and affect  her behavior is normal  Thought content normal      BMI Counseling: Body mass index is 32 8 kg/m²  The BMI is above normal  Nutrition recommendations include decreasing portion sizes  Exercise recommendations include exercising 3-5 times per week

## 2021-08-27 ENCOUNTER — OFFICE VISIT (OUTPATIENT)
Dept: SURGERY | Facility: CLINIC | Age: 76
End: 2021-08-27
Payer: MEDICARE

## 2021-08-27 VITALS
SYSTOLIC BLOOD PRESSURE: 128 MMHG | HEIGHT: 67 IN | BODY MASS INDEX: 31.08 KG/M2 | TEMPERATURE: 97.6 F | HEART RATE: 93 BPM | DIASTOLIC BLOOD PRESSURE: 80 MMHG | WEIGHT: 198 LBS | RESPIRATION RATE: 18 BRPM

## 2021-08-27 DIAGNOSIS — N63.20 BREAST MASS, LEFT: Primary | ICD-10-CM

## 2021-08-27 PROCEDURE — 99213 OFFICE O/P EST LOW 20 MIN: CPT | Performed by: SURGERY

## 2021-08-27 NOTE — PATIENT INSTRUCTIONS
Get an ultrasound of the left breast follow-up based upon that ultrasound    I also asked her to get a mammogram but I know she will not do it

## 2021-08-27 NOTE — PROGRESS NOTES
Assessment/Plan: bilateral fibrocystic changes with  A probable lipoma within the left breast   She refuses mammogram so I am going to go ahead and get a diagnostic ultrasound    No problem-specific Assessment & Plan notes found for this encounter  Diagnoses and all orders for this visit:    Breast mass, left  -     US breast left limited (diagnostic); Future          Subjective:      Patient ID: June D Bebe Murillo is a 64 y o  female  The patient is a 70-year-old female who I have been following for years  I have also been fighting with her for years because I cannot get her to get a mammogram performed  She comes in today stating that she has lost some weight and that she now think she can feel something in the left breast   The left breast is also tender but it has been so for years      The following portions of the patient's history were reviewed and updated as appropriate: allergies, current medications, past family history, past medical history, past social history, past surgical history and problem list     Review of Systems   Pertinent for hypothyroidism, irritable bowel fibromyalgia, wrist cystic breast disease    Objective:      /80 (BP Location: Right arm, Patient Position: Sitting, Cuff Size: Adult)   Pulse 93   Temp 97 6 °F (36 4 °C)   Resp 18   Ht 5' 7" (1 702 m)   Wt 89 8 kg (198 lb)   BMI 31 01 kg/m²          Physical Exam  Vitals reviewed  Exam conducted with a chaperone present  Constitutional:       General: She is not in acute distress  Appearance: Normal appearance  She is obese  She is not ill-appearing  HENT:      Head: Atraumatic  Eyes:      General: No scleral icterus  Conjunctiva/sclera: Conjunctivae normal    Cardiovascular:      Rate and Rhythm: Normal rate and regular rhythm  Heart sounds: Normal heart sounds  No murmur heard  Pulmonary:      Effort: No respiratory distress  Breath sounds: Normal breath sounds  No stridor   No wheezing, rhonchi or rales  Chest:      Breasts:         Right: Normal          Left: Tenderness present  Comments: The right breast hangs little lower than the left but the left breast is freire but as shown on the left breast laterally  This feels changes and does not feel like a cancer  Still, she needs an ultrasound  Abdominal:      General: There is no distension  Palpations: Abdomen is soft  There is no mass  Tenderness: There is no abdominal tenderness  Hernia: No hernia is present  Musculoskeletal:         General: Normal range of motion  Cervical back: Normal range of motion and neck supple  Lymphadenopathy:      Cervical: No cervical adenopathy  Skin:     General: Skin is warm and dry  Coloration: Skin is not jaundiced  Neurological:      Mental Status: She is alert and oriented to person, place, and time  Psychiatric:         Mood and Affect: Mood normal          Behavior: Behavior normal          Thought Content:  Thought content normal          Judgment: Judgment normal

## 2021-09-02 ENCOUNTER — APPOINTMENT (OUTPATIENT)
Dept: LAB | Facility: HOSPITAL | Age: 76
End: 2021-09-02
Payer: MEDICARE

## 2021-09-02 ENCOUNTER — HOSPITAL ENCOUNTER (OUTPATIENT)
Dept: RADIOLOGY | Facility: HOSPITAL | Age: 76
Discharge: HOME | End: 2021-09-02
Attending: SURGERY
Payer: MEDICARE

## 2021-09-02 DIAGNOSIS — E03.9 HYPOTHYROIDISM, UNSPECIFIED TYPE: ICD-10-CM

## 2021-09-02 DIAGNOSIS — N63.20 BREAST MASS, LEFT: ICD-10-CM

## 2021-09-02 LAB
T3FREE SERPL-MCNC: 2.83 PG/ML (ref 2.3–4.2)
T4 FREE SERPL-MCNC: 1.04 NG/DL (ref 0.76–1.46)
TSH SERPL DL<=0.05 MIU/L-ACNC: 0.15 UIU/ML (ref 0.34–5.6)

## 2021-09-02 PROCEDURE — 84439 ASSAY OF FREE THYROXINE: CPT

## 2021-09-02 PROCEDURE — 84481 FREE ASSAY (FT-3): CPT

## 2021-09-02 PROCEDURE — 84443 ASSAY THYROID STIM HORMONE: CPT

## 2021-09-02 PROCEDURE — 76642 ULTRASOUND BREAST LIMITED: CPT

## 2021-09-02 PROCEDURE — 36415 COLL VENOUS BLD VENIPUNCTURE: CPT

## 2021-09-28 ENCOUNTER — TELEPHONE (OUTPATIENT)
Dept: SURGERY | Facility: CLINIC | Age: 76
End: 2021-09-28

## 2021-11-17 ENCOUNTER — TELEPHONE (OUTPATIENT)
Dept: FAMILY MEDICINE CLINIC | Facility: CLINIC | Age: 76
End: 2021-11-17

## 2021-11-17 DIAGNOSIS — E03.9 ACQUIRED HYPOTHYROIDISM: Primary | ICD-10-CM

## 2021-11-18 ENCOUNTER — LAB (OUTPATIENT)
Dept: LAB | Facility: HOSPITAL | Age: 76
End: 2021-11-18
Payer: MEDICARE

## 2021-11-18 DIAGNOSIS — E03.9 ACQUIRED HYPOTHYROIDISM: ICD-10-CM

## 2021-11-18 LAB
T3FREE SERPL-MCNC: 4.14 PG/ML (ref 2.3–4.2)
T4 FREE SERPL-MCNC: 1.4 NG/DL (ref 0.76–1.46)
TSH SERPL DL<=0.05 MIU/L-ACNC: 0.03 UIU/ML (ref 0.34–5.6)

## 2021-11-18 PROCEDURE — 36415 COLL VENOUS BLD VENIPUNCTURE: CPT

## 2021-11-18 PROCEDURE — 84481 FREE ASSAY (FT-3): CPT

## 2021-11-18 PROCEDURE — 84439 ASSAY OF FREE THYROXINE: CPT

## 2021-11-18 PROCEDURE — 84443 ASSAY THYROID STIM HORMONE: CPT

## 2022-01-27 ENCOUNTER — APPOINTMENT (OUTPATIENT)
Dept: LAB | Facility: HOSPITAL | Age: 77
End: 2022-01-27
Payer: COMMERCIAL

## 2022-01-27 DIAGNOSIS — Z11.59 NEED FOR HEPATITIS C SCREENING TEST: ICD-10-CM

## 2022-01-27 DIAGNOSIS — E03.9 ACQUIRED HYPOTHYROIDISM: ICD-10-CM

## 2022-01-27 DIAGNOSIS — E88.09 HYPOALBUMINEMIA: ICD-10-CM

## 2022-01-27 LAB
CA-I BLD-SCNC: 1.21 MMOL/L (ref 1.12–1.32)
HCV AB SER QL: NORMAL
PREALB SERPL-MCNC: 20.4 MG/DL (ref 18–40)
T3FREE SERPL-MCNC: 2.77 PG/ML (ref 2.3–4.2)
T4 FREE SERPL-MCNC: 1.15 NG/DL (ref 0.76–1.46)
TSH SERPL DL<=0.05 MIU/L-ACNC: 0.06 UIU/ML (ref 0.34–5.6)

## 2022-01-27 PROCEDURE — 84134 ASSAY OF PREALBUMIN: CPT

## 2022-01-27 PROCEDURE — 84439 ASSAY OF FREE THYROXINE: CPT

## 2022-01-27 PROCEDURE — 86803 HEPATITIS C AB TEST: CPT

## 2022-01-27 PROCEDURE — 36415 COLL VENOUS BLD VENIPUNCTURE: CPT

## 2022-01-27 PROCEDURE — 82330 ASSAY OF CALCIUM: CPT

## 2022-01-27 PROCEDURE — 84481 FREE ASSAY (FT-3): CPT

## 2022-01-27 PROCEDURE — 84443 ASSAY THYROID STIM HORMONE: CPT

## 2022-02-03 ENCOUNTER — OFFICE VISIT (OUTPATIENT)
Dept: FAMILY MEDICINE CLINIC | Facility: CLINIC | Age: 77
End: 2022-02-03
Payer: COMMERCIAL

## 2022-02-03 VITALS
SYSTOLIC BLOOD PRESSURE: 134 MMHG | HEART RATE: 85 BPM | BODY MASS INDEX: 29.95 KG/M2 | DIASTOLIC BLOOD PRESSURE: 80 MMHG | WEIGHT: 190.8 LBS | RESPIRATION RATE: 16 BRPM | TEMPERATURE: 98.4 F | OXYGEN SATURATION: 98 % | HEIGHT: 67 IN

## 2022-02-03 DIAGNOSIS — Z00.00 ENCOUNTER FOR MEDICARE ANNUAL WELLNESS EXAM: Primary | ICD-10-CM

## 2022-02-03 DIAGNOSIS — E03.9 ACQUIRED HYPOTHYROIDISM: ICD-10-CM

## 2022-02-03 DIAGNOSIS — R73.01 IFG (IMPAIRED FASTING GLUCOSE): ICD-10-CM

## 2022-02-03 DIAGNOSIS — E88.09 HYPOALBUMINEMIA: ICD-10-CM

## 2022-02-03 DIAGNOSIS — E66.3 OVERWEIGHT (BMI 25.0-29.9): ICD-10-CM

## 2022-02-03 DIAGNOSIS — I73.9 PAD (PERIPHERAL ARTERY DISEASE) (HCC): ICD-10-CM

## 2022-02-03 DIAGNOSIS — R20.2 PARESTHESIA OF SKIN: ICD-10-CM

## 2022-02-03 DIAGNOSIS — M79.641 BILATERAL HAND PAIN: ICD-10-CM

## 2022-02-03 DIAGNOSIS — M79.642 BILATERAL HAND PAIN: ICD-10-CM

## 2022-02-03 DIAGNOSIS — M79.7 FIBROMYALGIA: ICD-10-CM

## 2022-02-03 DIAGNOSIS — K58.2 IRRITABLE BOWEL SYNDROME WITH BOTH CONSTIPATION AND DIARRHEA: ICD-10-CM

## 2022-02-03 PROBLEM — Z71.89 COUNSELING REGARDING ADVANCED DIRECTIVES: Status: ACTIVE | Noted: 2022-02-03

## 2022-02-03 PROCEDURE — G0439 PPPS, SUBSEQ VISIT: HCPCS | Performed by: FAMILY MEDICINE

## 2022-02-03 PROCEDURE — 99214 OFFICE O/P EST MOD 30 MIN: CPT | Performed by: FAMILY MEDICINE

## 2022-02-03 RX ORDER — LEVOTHYROXINE SODIUM 88 UG/1
88 TABLET ORAL DAILY
Qty: 90 TABLET | Refills: 1 | Status: SHIPPED | OUTPATIENT
Start: 2022-02-03 | End: 2022-08-04

## 2022-02-03 NOTE — PROGRESS NOTES
Assessment/Plan:     1  Encounter for Medicare annual wellness exam  See other note     2  Acquired hypothyroidism  TSH abnormal but free T3 and free T4 normal   Continue current doses  Repeat labs in 4 months with other blood work   - Comprehensive metabolic panel; Future  - Lipid panel; Future  - TSH, 3rd generation with Free T4 reflex; Future  - Hemoglobin A1C; Future  - Vitamin D 25 hydroxy; Future  - TSH, 3rd generation; Future  - T3, free; Future  - T4, free; Future  - C-reactive protein; Future  - Homocysteine, serum; Future  - levothyroxine (Synthroid) 88 mcg tablet; Take 1 tablet (88 mcg total) by mouth daily  Dispense: 90 tablet; Refill: 1    3  Hypoalbuminemia  Improved  Diet good  4  Irritable bowel syndrome with both constipation and diarrhea  Stable  - Comprehensive metabolic panel; Future  - Lipid panel; Future  - TSH, 3rd generation with Free T4 reflex; Future  - Hemoglobin A1C; Future  - Vitamin D 25 hydroxy; Future  - TSH, 3rd generation; Future  - T3, free; Future  - T4, free; Future  - C-reactive protein; Future  - Homocysteine, serum; Future  - levothyroxine (Synthroid) 88 mcg tablet; Take 1 tablet (88 mcg total) by mouth daily  Dispense: 90 tablet; Refill: 1    5  Fibromyalgia  Stable  - Comprehensive metabolic panel; Future  - Lipid panel; Future  - TSH, 3rd generation with Free T4 reflex; Future  - Hemoglobin A1C; Future  - Vitamin D 25 hydroxy; Future  - TSH, 3rd generation; Future  - T3, free; Future  - T4, free; Future  - C-reactive protein; Future  - Homocysteine, serum; Future  - levothyroxine (Synthroid) 88 mcg tablet; Take 1 tablet (88 mcg total) by mouth daily  Dispense: 90 tablet; Refill: 1    6  Bilateral hand pain  Try splints  Consider referral if not better or worsens    - Cock Up Wrist Splint    7  IFG (impaired fasting glucose)  Update labs   - Comprehensive metabolic panel; Future  - Lipid panel; Future  - TSH, 3rd generation with Free T4 reflex;  Future  - Hemoglobin A1C; Future  - Vitamin D 25 hydroxy; Future  - TSH, 3rd generation; Future  - T3, free; Future  - T4, free; Future  - C-reactive protein; Future  - Homocysteine, serum; Future  - levothyroxine (Synthroid) 88 mcg tablet; Take 1 tablet (88 mcg total) by mouth daily  Dispense: 90 tablet; Refill: 1    8  Patient now overweight - has been doing well with diet, exercise and weight loss   - Comprehensive metabolic panel; Future  - Lipid panel; Future  - TSH, 3rd generation with Free T4 reflex; Future  - Hemoglobin A1C; Future  - Vitamin D 25 hydroxy; Future  - TSH, 3rd generation; Future  - T3, free; Future  - T4, free; Future  - C-reactive protein; Future  - Homocysteine, serum; Future  - levothyroxine (Synthroid) 88 mcg tablet; Take 1 tablet (88 mcg total) by mouth daily  Dispense: 90 tablet; Refill: 1    9  PAD (peripheral artery disease) ([facility])  Stable     10  Paresthesia of skin   Update labs - pt requests vit D, CRP and homocysteine  - Homocysteine, serum; Future    labs 4 -6 months   Follow up 6 months CC   No follow-ups on file      Subjective:   Mariah is a 64 y o  female here today for a follow-up on her current medical conditions:  Patient Active Problem List   Diagnosis    Arthritis    Acquired hypothyroidism    Fibromyalgia    Costochondritis    Chronic bilateral thoracic back pain    IFG (impaired fasting glucose)    Irritable bowel syndrome with both constipation and diarrhea    History of basal cell carcinoma (BCC) of skin    Class 1 obesity due to excess calories with serious comorbidity and body mass index (BMI) of 32 0 to 32 9 in adult    Fibrocystic breast disease    No vaccination-pt refuse    Breast mass, left    Counseling regarding advanced directives    PAD (peripheral artery disease) ([facility])        Current Medications:  Current Outpatient Medications   Medication Sig Dispense Refill    Accu-Chek Softclix Lancets lancets Use daily Testing once daily 100 each 3    Cholecalciferol (Vitamin D3) 10 MCG/ML LIQD Take 6,000 Units by mouth daily      glucose blood test strip Use 1 each daily Testing once daily Accu Check Lashae Plus ALYCE BLOUNT test strips 100 each 3    liothyronine (CYTOMEL) 25 mcg tablet Take 0 5 tablets (12 5 mcg total) by mouth daily      Multiple Vitamins-Minerals (MULTIVITAMIN ADULT PO) Take by mouth      NON FORMULARY       levothyroxine (Synthroid) 88 mcg tablet Take 1 tablet (88 mcg total) by mouth daily 90 tablet 1     No current facility-administered medications for this visit  HPI:  Chief Complaint   Patient presents with    Labs     Done 1/27/2022         Patient declined COVID, PPSV23, TDAP , FLU     Medication Refill     None at this time   [facility] Wellness Visit    Virtual Awv     -- Above per clinical staff and reviewed  --    PHQ-2/9 Depression Screening    Little interest or pleasure in doing things: 0 - not at all  Feeling down, depressed, or hopeless: 0 - not at all  PHQ-2 Score: 0  PHQ-2 Interpretation: Negative depression screen            says she is not seeing Dr Owen Cheadle anymore   1/27/22 - TSH 0 055, calcium 1 21, free T4 1/15, free T3 2 77, prealbumin 20 4   Jul y labs in chart from Dr Odalys Ivory - fbs 102, Jose calcium 10 2  adjusted meds 12/17/20 up to 100 mg levo   TSH to be recehcked 2/ 16/21 - ordered   last A1C 2/2021, lipids 12/2020   last visit referred to PT  Miralax recommended  mood reviewed - was thinking of a supplement for mood  consider Cymbalta  20 mg  ortho right ankle OA - PT ice orthics   Today:  After 5 -6 days CBD cream was causing her some side effects - was using a lot  Was not helping her either  Making heart racing and caused anxiety  Lasted days     When asked feels thyroid might not be regulated   Feels like her hair is thinner  Nails "are something"  Racing heart from cream  Made her feel anxious - but was able to calm herself   Blueberries and yogurt  hummus   Meat, side, veggies  Working on weight loss   Has eaten more protein   Wrists are hurting her more often and into hands some  ?numbness  Maybe CTS? The following portions of the patient's history were reviewed and updated as appropriate: allergies, current medications, past family history, past medical history, past social history, past surgical history and problem list     Objective:  Vitals:  /80   Pulse 85   Temp 98 4 °F (36 9 °C)   Resp 16   Ht 5' 7" (1 702 m)   Wt 86 5 kg (190 lb 12 8 oz)   SpO2 98%   BMI 29 88 kg/m²    Wt Readings from Last 3 Encounters:   02/03/22 86 5 kg (190 lb 12 8 oz)   08/27/21 89 8 kg (198 lb)   07/29/21 92 2 kg (203 lb 3 2 oz)      BP Readings from Last 3 Encounters:   02/03/22 134/80   08/27/21 128/80   07/29/21 130/74        Review of Systems   She has no other concerns  No unexpected weight changes  No chest pain, SOB, or palpitations (better now) No GERD  No changes in bowels or bladder  Sleeping well  + anxiety        Physical Exam   Constitutional:  she appears well-developed and well-nourished  HENT: Head: Normocephalic  Neck: Neck supple  Cardiovascular: Normal rate, regular rhythm and normal heart sounds  Pulmonary/Chest: Effort normal and breath sounds normal  No wheezes, rales, or rhonchi  Abdominal: Soft  Bowel sounds are normal  There is no tenderness  No hepatosplenomegaly  Musculoskeletal: she exhibits no edema  Lymphadenopathy: she has no cervical adenopathy  Neurological: she is alert and oriented to person, place, and time  Skin: Skin is warm and dry  Psychiatric: she has a normal mood and affect  her behavior is normal  Thought content normal      BMI Counseling: Body mass index is 29 88 kg/m²  The BMI is above normal  Nutrition recommendations include decreasing portion sizes  Exercise recommendations include exercising 3-5 times per week  Rationale for BMI follow-up plan is due to patient being overweight or obese       Depression Screening and Follow-up Plan: Patient was screened for depression during today's encounter  They screened negative with a PHQ-2 score of 0  [facility] Plan of Care: Balance, strength, and gait training instructions were provided

## 2022-02-03 NOTE — PATIENT INSTRUCTIONS
Fall Prevention   AMBULATORY CARE:   Fall prevention  includes ways to make your home and other areas safer  It also includes ways you can move more carefully to prevent a fall  Health conditions that cause changes in your blood pressure, vision, or muscle strength and coordination may increase your risk for falls  Medicines may also increase your risk for falls if they make you dizzy, weak, or sleepy  Call 911 or have someone else call if:   · You have fallen and are unconscious  · You have fallen and cannot move part of your body  Contact your healthcare provider if:   · You have fallen and have pain or a headache  · You have questions or concerns about your condition or care  Fall prevention tips:   · Stand or sit up slowly  This may help you keep your balance and prevent falls  · Use assistive devices as directed  Your healthcare provider may suggest that you use a cane or walker to help you keep your balance  You may need to have grab bars put in your bathroom near the toilet or in the shower  · Wear shoes that fit well and have soles that   Wear shoes both inside and outside  Use slippers with good   Do not wear shoes with high heels  · Wear a personal alarm  This is a device that allows you to call 911 if you fall and need help  Ask your healthcare provider for more information  · Stay active  Exercise can help strengthen your muscles and improve your balance  Your healthcare provider may recommend water aerobics or walking  He or she may also recommend physical therapy to improve your coordination  Never start an exercise program without talking to your healthcare provider first          · Manage your medical conditions  Keep all appointments with your healthcare providers  Visit your eye doctor as directed  Home safety tips:       · Add items to prevent falls in the bathroom  Put nonslip strips on your bath or shower floor to prevent you from slipping   Use a bath mat if you do not have carpet in the bathroom  This will prevent you from falling when you step out of the bath or shower  Use a shower seat so you do not need to stand while you shower  Sit on the toilet or a chair in your bathroom to dry yourself and put on clothing  This will prevent you from losing your balance from drying or dressing yourself while you are standing  · Keep paths clear  Remove books, shoes, and other objects from walkways and stairs  Place cords for telephones and lamps out of the way so that you do not need to walk over them  Tape them down if you cannot move them  Remove small rugs  If you cannot remove a rug, secure it with double-sided tape  This will prevent you from tripping  · Install bright lights in your home  Use night lights to help light paths to the bathroom or kitchen  Always turn on the light before you start walking  · Keep items you use often on shelves within reach  Do not use a step stool to help you reach an item  · Paint or place reflective tape on the edges of your stairs  This will help you see the stairs better  Follow up with your doctor as directed:  Write down your questions so you remember to ask them during your visits  © Copyright [employer] 2021 Information is for End User's use only and may not be sold, redistributed or otherwise used for commercial purposes  All illustrations and images included in CareNotes® are the copyrighted property of [employer]  or [employer]   The above information is an  only  It is not intended as medical advice for individual conditions or treatments  Talk to your doctor, nurse or pharmacist before following any medical regimen to see if it is safe and effective for you  Medicare Preventive Visit Patient Instructions  Thank you for completing your Welcome to Medicare Visit or Medicare Annual Wellness Visit today  Your next wellness visit will be due in one year (2/4/2023)    The screening/preventive services that you may require over the next 5-10 years are detailed below  Some tests may not apply to you based off risk factors and/or age  Screening tests ordered at today's visit but not completed yet may show as past due  Also, please note that scanned in results may not display below  Preventive Screenings:  Service Recommendations Previous Testing/Comments   Colorectal Cancer Screening  * Colonoscopy    * Fecal Occult Blood Test (FOBT)/Fecal Immunochemical Test (FIT)  * Fecal DNA/Cologuard Test  * Flexible Sigmoidoscopy Age: 54-65 years old   Colonoscopy: every 10 years (may be performed more frequently if at higher risk)  OR  FOBT/FIT: every 1 year  OR  Cologuard: every 3 years  OR  Sigmoidoscopy: every 5 years  Screening may be recommended earlier than age 48 if at higher risk for colorectal cancer  Also, an individualized decision between you and your healthcare provider will decide whether screening between the ages of 74-80 would be appropriate  Colonoscopy: Not on file  FOBT/FIT: Not on file  Cologuard: Not on file  Sigmoidoscopy: Not on file          Breast Cancer Screening Age: 36 years old  Frequency: every 1-2 years  Not required if history of left and right mastectomy Mammogram: Not on file        Cervical Cancer Screening Between the ages of 21-29, pap smear recommended once every 3 years  Between the ages of 33-67, can perform pap smear with HPV co-testing every 5 years     Recommendations may differ for women with a history of total hysterectomy, cervical cancer, or abnormal pap smears in past  Pap Smear: 03/01/2018    Screening Not Indicated   Hepatitis C Screening Once for adults born between 1945 and 1965  More frequently in patients at high risk for Hepatitis C Hep C Antibody: 01/27/2022    Screening Current   Diabetes Screening 1-2 times per year if you're at risk for diabetes or have pre-diabetes Fasting glucose: 102 mg/dL   A1C: 5 6 %    Screening Current Cholesterol Screening Once every 5 years if you don't have a lipid disorder  May order more often based on risk factors  Lipid panel: 12/16/2020    Screening Current     Other Preventive Screenings Covered by Medicare:  1  Abdominal Aortic Aneurysm (AAA) Screening: covered once if your at risk  You're considered to be at risk if you have a family history of AAA  2  Lung Cancer Screening: covers low dose CT scan once per year if you meet all of the following conditions: (1) Age 50-69; (2) No signs or symptoms of lung cancer; (3) Current smoker or have quit smoking within the last 15 years; (4) You have a tobacco smoking history of at least 30 pack years (packs per day multiplied by number of years you smoked); (5) You get a written order from a healthcare provider  3  Glaucoma Screening: covered annually if you're considered high risk: (1) You have diabetes OR (2) Family history of glaucoma OR (3)  aged 48 and older OR (3)  American aged 72 and older  3  Osteoporosis Screening: covered every 2 years if you meet one of the following conditions: (1) You're estrogen deficient and at risk for osteoporosis based off medical history and other findings; (2) Have a vertebral abnormality; (3) On glucocorticoid therapy for more than 3 months; (4) Have primary hyperparathyroidism; (5) On osteoporosis medications and need to assess response to drug therapy  · Last bone density test (DXA Scan): 02/12/2020   5  HIV Screening: covered annually if you're between the age of 15-65  Also covered annually if you are younger than 13 and older than 72 with risk factors for HIV infection  For pregnant patients, it is covered up to 3 times per pregnancy      Immunizations:  Immunization Recommendations   Influenza Vaccine Annual influenza vaccination during flu season is recommended for all persons aged >= 6 months who do not have contraindications   Pneumococcal Vaccine (Prevnar and Pneumovax)  * Prevnar = PCV13  * Pneumovax = PPSV23   Adults 25-60 years old: 1-3 doses may be recommended based on certain risk factors  Adults 72 years old: Prevnar (PCV13) vaccine recommended followed by Pneumovax (PPSV23) vaccine  If already received PPSV23 since turning 65, then PCV13 recommended at least one year after PPSV23 dose  Hepatitis B Vaccine 3 dose series if at intermediate or high risk (ex: diabetes, end stage renal disease, liver disease)   Tetanus (Td) Vaccine - COST NOT COVERED BY MEDICARE PART B Following completion of primary series, a booster dose should be given every 10 years to maintain immunity against tetanus  Td may also be given as tetanus wound prophylaxis  Tdap Vaccine - COST NOT COVERED BY MEDICARE PART B Recommended at least once for all adults  For pregnant patients, recommended with each pregnancy  Shingles Vaccine (Shingrix) - COST NOT COVERED BY MEDICARE PART B  2 shot series recommended in those aged 48 and above     Health Maintenance Due:      Topic Date Due    Cervical Cancer Screening  03/01/2023    DXA SCAN  02/12/2025    Hepatitis C Screening  Completed     Immunizations Due:  There are no preventive care reminders to display for this patient  Advance Directives   What are advance directives? Advance directives are legal documents that state your wishes and plans for medical care  These plans are made ahead of time in case you lose your ability to make decisions for yourself  Advance directives can apply to any medical decision, such as the treatments you want, and if you want to donate organs  What are the types of advance directives? There are many types of advance directives, and each state has rules about how to use them  You may choose a combination of any of the following:  · Living will: This is a written record of the treatment you want  You can also choose which treatments you do not want, which to limit, and which to stop at a certain time   This includes surgery, medicine, IV fluid, and tube feedings  · Durable power of  for healthcare [place]): This is a written record that states who you want to make healthcare choices for you when you are unable to make them for yourself  This person, called a proxy, is usually a family member or a friend  You may choose more than 1 proxy  · Do not resuscitate (DNR) order:  A DNR order is used in case your heart stops beating or you stop breathing  It is a request not to have certain forms of treatment, such as CPR  A DNR order may be included in other types of advance directives  · Medical directive: This covers the care that you want if you are in a coma, near death, or unable to make decisions for yourself  You can list the treatments you want for each condition  Treatment may include pain medicine, surgery, blood transfusions, dialysis, IV or tube feedings, and a ventilator (breathing machine)  · Values history: This document has questions about your views, beliefs, and how you feel and think about life  This information can help others choose the care that you would choose  Why are advance directives important? An advance directive helps you control your care  Although spoken wishes may be used, it is better to have your wishes written down  Spoken wishes can be misunderstood, or not followed  Treatments may be given even if you do not want them  An advance directive may make it easier for your family to make difficult choices about your care  Weight Management   Why it is important to manage your weight:  Being overweight increases your risk of health conditions such as heart disease, high blood pressure, type 2 diabetes, and certain types of cancer  It can also increase your risk for osteoarthritis, sleep apnea, and other respiratory problems  Aim for a slow, steady weight loss  Even a small amount of weight loss can lower your risk of health problems    How to lose weight safely:  A safe and healthy way to lose weight is to eat fewer calories and get regular exercise  You can lose up about 1 pound a week by decreasing the number of calories you eat by 500 calories each day  Healthy meal plan for weight management:  A healthy meal plan includes a variety of foods, contains fewer calories, and helps you stay healthy  A healthy meal plan includes the following:  · Eat whole-grain foods more often  A healthy meal plan should contain fiber  Fiber is the part of grains, fruits, and vegetables that is not broken down by your body  Whole-grain foods are healthy and provide extra fiber in your diet  Some examples of whole-grain foods are whole-wheat breads and pastas, oatmeal, brown rice, and bulgur  · Eat a variety of vegetables every day  Include dark, leafy greens such as spinach, kale, ganga greens, and mustard greens  Eat yellow and orange vegetables such as carrots, sweet potatoes, and winter squash  · Eat a variety of fruits every day  Choose fresh or canned fruit (canned in its own juice or light syrup) instead of juice  Fruit juice has very little or no fiber  · Eat low-fat dairy foods  Drink fat-free (skim) milk or 1% milk  Eat fat-free yogurt and low-fat cottage cheese  Try low-fat cheeses such as mozzarella and other reduced-fat cheeses  · Choose meat and other protein foods that are low in fat  Choose beans or other legumes such as split peas or lentils  Choose fish, skinless poultry (chicken or turkey), or lean cuts of red meat (beef or pork)  Before you cook meat or poultry, cut off any visible fat  · Use less fat and oil  Try baking foods instead of frying them  Add less fat, such as margarine, sour cream, regular salad dressing and mayonnaise to foods  Eat fewer high-fat foods  Some examples of high-fat foods include french fries, doughnuts, ice cream, and cakes  · Eat fewer sweets  Limit foods and drinks that are high in sugar   This includes candy, cookies, regular soda, and sweetened drinks  Exercise:  Exercise at least 30 minutes per day on most days of the week  Some examples of exercise include walking, biking, dancing, and swimming  You can also fit in more physical activity by taking the stairs instead of the elevator or parking farther away from stores  Ask your healthcare provider about the best exercise plan for you  © Copyright [employer] 2018 Information is for End User's use only and may not be sold, redistributed or otherwise used for commercial purposes  All illustrations and images included in CareNotes® are the copyrighted property of [employer]  or [facility] Preventive Visit Patient Instructions  Thank you for completing your Welcome to Medicare Visit or Medicare Annual Wellness Visit today  Your next wellness visit will be due in one year (2/5/2023)  The screening/preventive services that you may require over the next 5-10 years are detailed below  Some tests may not apply to you based off risk factors and/or age  Screening tests ordered at today's visit but not completed yet may show as past due  Also, please note that scanned in results may not display below  Preventive Screenings:  Service Recommendations Previous Testing/Comments   Colorectal Cancer Screening  * Colonoscopy    * Fecal Occult Blood Test (FOBT)/Fecal Immunochemical Test (FIT)  * Fecal DNA/Cologuard Test  * Flexible Sigmoidoscopy Age: 54-65 years old   Colonoscopy: every 10 years (may be performed more frequently if at higher risk)  OR  FOBT/FIT: every 1 year  OR  Cologuard: every 3 years  OR  Sigmoidoscopy: every 5 years  Screening may be recommended earlier than age 48 if at higher risk for colorectal cancer  Also, an individualized decision between you and your healthcare provider will decide whether screening between the ages of 74-80 would be appropriate   Colonoscopy: Not on file  FOBT/FIT: Not on file  Cologuard: Not on file  Sigmoidoscopy: Not on file    Screening Not Indicated     Breast Cancer Screening Age: 36 years old  Frequency: every 1-2 years  Not required if history of left and right mastectomy Mammogram: Not on file    Screening Not Indicated   Cervical Cancer Screening Between the ages of 21-29, pap smear recommended once every 3 years  Between the ages of 33-67, can perform pap smear with HPV co-testing every 5 years  Recommendations may differ for women with a history of total hysterectomy, cervical cancer, or abnormal pap smears in past  Pap Smear: 03/01/2018    Screening Not Indicated   Hepatitis C Screening Once for adults born between 1945 and 1965  More frequently in patients at high risk for Hepatitis C Hep C Antibody: 01/27/2022    Screening Current   Diabetes Screening 1-2 times per year if you're at risk for diabetes or have pre-diabetes Fasting glucose: 102 mg/dL   A1C: 5 6 %    Screening Current   Cholesterol Screening Once every 5 years if you don't have a lipid disorder  May order more often based on risk factors  Lipid panel: 12/16/2020    Screening Current     Other Preventive Screenings Covered by Medicare:  6  Abdominal Aortic Aneurysm (AAA) Screening: covered once if your at risk  You're considered to be at risk if you have a family history of AAA  7  Lung Cancer Screening: covers low dose CT scan once per year if you meet all of the following conditions: (1) Age 50-69; (2) No signs or symptoms of lung cancer; (3) Current smoker or have quit smoking within the last 15 years; (4) You have a tobacco smoking history of at least 30 pack years (packs per day multiplied by number of years you smoked); (5) You get a written order from a healthcare provider  8  Glaucoma Screening: covered annually if you're considered high risk: (1) You have diabetes OR (2) Family history of glaucoma OR (3)  aged 48 and older OR (3)  American aged 72 and older  5   Osteoporosis Screening: covered every 2 years if you meet one of the following conditions: (1) You're estrogen deficient and at risk for osteoporosis based off medical history and other findings; (2) Have a vertebral abnormality; (3) On glucocorticoid therapy for more than 3 months; (4) Have primary hyperparathyroidism; (5) On osteoporosis medications and need to assess response to drug therapy  · Last bone density test (DXA Scan): 02/12/2020  10  HIV Screening: covered annually if you're between the age of 12-76  Also covered annually if you are younger than 13 and older than 72 with risk factors for HIV infection  For pregnant patients, it is covered up to 3 times per pregnancy  Immunizations:  Immunization Recommendations   Influenza Vaccine Annual influenza vaccination during flu season is recommended for all persons aged >= 6 months who do not have contraindications   Pneumococcal Vaccine (Prevnar and Pneumovax)  * Prevnar = PCV13  * Pneumovax = PPSV23   Adults 25-60 years old: 1-3 doses may be recommended based on certain risk factors  Adults 72 years old: Prevnar (PCV13) vaccine recommended followed by Pneumovax (PPSV23) vaccine  If already received PPSV23 since turning 65, then PCV13 recommended at least one year after PPSV23 dose  Hepatitis B Vaccine 3 dose series if at intermediate or high risk (ex: diabetes, end stage renal disease, liver disease)   Tetanus (Td) Vaccine - COST NOT COVERED BY MEDICARE PART B Following completion of primary series, a booster dose should be given every 10 years to maintain immunity against tetanus  Td may also be given as tetanus wound prophylaxis  Tdap Vaccine - COST NOT COVERED BY MEDICARE PART B Recommended at least once for all adults  For pregnant patients, recommended with each pregnancy     Shingles Vaccine (Shingrix) - COST NOT COVERED BY MEDICARE PART B  2 shot series recommended in those aged 48 and above     Health Maintenance Due:      Topic Date Due    Cervical Cancer Screening  03/01/2023    DXA SCAN  02/12/2025    Hepatitis C Screening  Completed     Immunizations Due:  There are no preventive care reminders to display for this patient  Advance Directives   What are advance directives? Advance directives are legal documents that state your wishes and plans for medical care  These plans are made ahead of time in case you lose your ability to make decisions for yourself  Advance directives can apply to any medical decision, such as the treatments you want, and if you want to donate organs  What are the types of advance directives? There are many types of advance directives, and each state has rules about how to use them  You may choose a combination of any of the following:  · Living will: This is a written record of the treatment you want  You can also choose which treatments you do not want, which to limit, and which to stop at a certain time  This includes surgery, medicine, IV fluid, and tube feedings  · Durable power of  for healthcare [place] [facility]): This is a written record that states who you want to make healthcare choices for you when you are unable to make them for yourself  This person, called a proxy, is usually a family member or a friend  You may choose more than 1 proxy  · Do not resuscitate (DNR) order:  A DNR order is used in case your heart stops beating or you stop breathing  It is a request not to have certain forms of treatment, such as CPR  A DNR order may be included in other types of advance directives  · Medical directive: This covers the care that you want if you are in a coma, near death, or unable to make decisions for yourself  You can list the treatments you want for each condition  Treatment may include pain medicine, surgery, blood transfusions, dialysis, IV or tube feedings, and a ventilator (breathing machine)  · Values history: This document has questions about your views, beliefs, and how you feel and think about life   This information can help others choose the care that you would choose  Why are advance directives important? An advance directive helps you control your care  Although spoken wishes may be used, it is better to have your wishes written down  Spoken wishes can be misunderstood, or not followed  Treatments may be given even if you do not want them  An advance directive may make it easier for your family to make difficult choices about your care  Weight Management   Why it is important to manage your weight:  Being overweight increases your risk of health conditions such as heart disease, high blood pressure, type 2 diabetes, and certain types of cancer  It can also increase your risk for osteoarthritis, sleep apnea, and other respiratory problems  Aim for a slow, steady weight loss  Even a small amount of weight loss can lower your risk of health problems  How to lose weight safely:  A safe and healthy way to lose weight is to eat fewer calories and get regular exercise  You can lose up about 1 pound a week by decreasing the number of calories you eat by 500 calories each day  Healthy meal plan for weight management:  A healthy meal plan includes a variety of foods, contains fewer calories, and helps you stay healthy  A healthy meal plan includes the following:  · Eat whole-grain foods more often  A healthy meal plan should contain fiber  Fiber is the part of grains, fruits, and vegetables that is not broken down by your body  Whole-grain foods are healthy and provide extra fiber in your diet  Some examples of whole-grain foods are whole-wheat breads and pastas, oatmeal, brown rice, and bulgur  · Eat a variety of vegetables every day  Include dark, leafy greens such as spinach, kale, ganga greens, and mustard greens  Eat yellow and orange vegetables such as carrots, sweet potatoes, and winter squash  · Eat a variety of fruits every day  Choose fresh or canned fruit (canned in its own juice or light syrup) instead of juice   Fruit juice has very little or no fiber   · Eat low-fat dairy foods  Drink fat-free (skim) milk or 1% milk  Eat fat-free yogurt and low-fat cottage cheese  Try low-fat cheeses such as mozzarella and other reduced-fat cheeses  · Choose meat and other protein foods that are low in fat  Choose beans or other legumes such as split peas or lentils  Choose fish, skinless poultry (chicken or turkey), or lean cuts of red meat (beef or pork)  Before you cook meat or poultry, cut off any visible fat  · Use less fat and oil  Try baking foods instead of frying them  Add less fat, such as margarine, sour cream, regular salad dressing and mayonnaise to foods  Eat fewer high-fat foods  Some examples of high-fat foods include french fries, doughnuts, ice cream, and cakes  · Eat fewer sweets  Limit foods and drinks that are high in sugar  This includes candy, cookies, regular soda, and sweetened drinks  Exercise:  Exercise at least 30 minutes per day on most days of the week  Some examples of exercise include walking, biking, dancing, and swimming  You can also fit in more physical activity by taking the stairs instead of the elevator or parking farther away from stores  Ask your healthcare provider about the best exercise plan for you  © Copyright [employer] 2018 Information is for End User's use only and may not be sold, redistributed or otherwise used for commercial purposes   All illustrations and images included in CareNotes® are the copyrighted property of A D A M , Inc  or [address] [place] [address]

## 2022-02-03 NOTE — PROGRESS NOTES
Assessment and Plan:     Problem List Items Addressed This Visit        Unprioritized    Acquired hypothyroidism    Relevant Medications    levothyroxine (Synthroid) 88 mcg tablet    Other Relevant Orders    Comprehensive metabolic panel    Lipid panel    TSH, 3rd generation with Free T4 reflex    Hemoglobin A1C    Vitamin D 25 hydroxy    TSH, 3rd generation    T3, free    T4, free    C-reactive protein    Homocysteine, serum    Class 1 obesity due to excess calories with serious comorbidity and body mass index (BMI) of 32 0 to 32 9 in adult    Relevant Medications    levothyroxine (Synthroid) 88 mcg tablet    Other Relevant Orders    Comprehensive metabolic panel    Lipid panel    TSH, 3rd generation with Free T4 reflex    Hemoglobin A1C    Vitamin D 25 hydroxy    TSH, 3rd generation    T3, free    T4, free    C-reactive protein    Homocysteine, serum    Fibromyalgia    Relevant Medications    levothyroxine (Synthroid) 88 mcg tablet    Other Relevant Orders    Comprehensive metabolic panel    Lipid panel    TSH, 3rd generation with Free T4 reflex    Hemoglobin A1C    Vitamin D 25 hydroxy    TSH, 3rd generation    T3, free    T4, free    C-reactive protein    Homocysteine, serum    IFG (impaired fasting glucose)    Relevant Medications    levothyroxine (Synthroid) 88 mcg tablet    Other Relevant Orders    Comprehensive metabolic panel    Lipid panel    TSH, 3rd generation with Free T4 reflex    Hemoglobin A1C    Vitamin D 25 hydroxy    TSH, 3rd generation    T3, free    T4, free    C-reactive protein    Homocysteine, serum    Irritable bowel syndrome with both constipation and diarrhea    Relevant Medications    levothyroxine (Synthroid) 88 mcg tablet    Other Relevant Orders    Comprehensive metabolic panel    Lipid panel    TSH, 3rd generation with Free T4 reflex    Hemoglobin A1C    Vitamin D 25 hydroxy    TSH, 3rd generation    T3, free    T4, free    C-reactive protein    Homocysteine, serum    PAD (peripheral artery disease) ([facility] )      Other Visit Diagnoses     Encounter for Medicare annual wellness exam    -  Primary    Hypoalbuminemia        Bilateral hand pain        Relevant Orders    Cock Up Wrist Splint    Paresthesia of skin         Relevant Orders    Homocysteine, serum           Preventive health issues were discussed with patient, and age appropriate screening tests were ordered as noted in patient's After Visit Summary  Personalized health advice and appropriate referrals for health education or preventive services given if needed, as noted in patient's After Visit Summary  Declines vaccines   5 wishes given  Update blood work before next visit        History of Present Illness:     Patient presents for Medicare Annual Wellness visit    Patient Care Team:  Lani Malone DO as PCP - General (Family Medicine)  Xiao Shirley MD     Problem List:     Patient Active Problem List   Diagnosis    Arthritis    Acquired hypothyroidism    Fibromyalgia    Costochondritis    Chronic bilateral thoracic back pain    IFG (impaired fasting glucose)    Irritable bowel syndrome with both constipation and diarrhea    History of basal cell carcinoma (BCC) of skin    Class 1 obesity due to excess calories with serious comorbidity and body mass index (BMI) of 32 0 to 32 9 in adult    Fibrocystic breast disease    No vaccination-pt refuse    Breast mass, left    Counseling regarding advanced directives    PAD (peripheral artery disease) ([facility] )      Past Medical and Surgical History:     Past Medical History:   Diagnosis Date    Arthritis     Basal cell carcinoma     Breast lump     Cancer [facility]     Disease of thyroid gland     Kidney stone     Stone     Past Surgical History:   Procedure Laterality Date    BELPHAROPTOSIS REPAIR      BREAST BIOPSY Left     BREAST CYST EXCISION Left     BREAST SURGERY      Puncture aspiration of a cyst    COLONOSCOPY      JOINT REPLACEMENT      both hips    SKIN LESION EXCISION      Back, Lt Face    TONSILLECTOMY      TOTAL HIP ARTHROPLASTY Bilateral     Impression 16    WISDOM TOOTH EXTRACTION        Family History:     Family History   Problem Relation Age of Onset    Diabetes Father     Hearing loss Father     Uterine cancer Family     Stroke Mother     Hearing loss Mother     Vision loss Mother     Thyroid disease Mother     No Known Problems Brother     No Known Problems Daughter     Endometrial cancer Maternal Grandmother     Heart disease Neg Hx       Social History:     Social History     Socioeconomic History    Marital status: /Civil Union     Spouse name: None    Number of children: 1    Years of education: None    Highest education level: None   Occupational History    None   Tobacco Use    Smoking status: Former Smoker     Packs/day: 1      Years: 6      Pack years: 6      Types: Cigarettes     Start date: 1960     Quit date: 1965     Years since quittin 4    Smokeless tobacco: Never Used    Tobacco comment: 8 years smoking per [employer] Vaping Use: Never used   Substance and Sexual Activity    Alcohol use:  Yes     Alcohol/week: 0 0 standard drinks     Comment: wine with dinner occasionally    Drug use: No    Sexual activity: Yes     Partners: Male     Birth control/protection: None   Other Topics Concern    None   Social History Narrative    Lives with her  José Manuel  > 45 years     3 children live locally - one hers, 2 her husbands (one son estranged for 25 years) - 8 grandchildren, 3 great grandchildren          Never a smoker - As per [employer]    No alcohol use - As per [employer]         Most recent tobacco use screenin2018    Advance directive: Yes 12/3/19    Live alone or with others: with others    Marital status:     Are you currently employed: No    Alcohol intake: None    Chewing tobacco: none    Per Nicole Gilbert     Social Determinants of Health     Financial Resource Strain: Not on file   Food Insecurity: Not on file   Transportation Needs: Not on file   Physical Activity: Not on file   Stress: Not on file   Social Connections: Not on file   Intimate Partner Violence: Not on file   Housing Stability: Not on file      Medications and Allergies:     Current Outpatient Medications   Medication Sig Dispense Refill    Accu-Chek Softclix Lancets lancets Use daily Testing once daily 100 each 3    Cholecalciferol (Vitamin D3) 10 MCG/ML LIQD Take 6,000 Units by mouth daily      glucose blood test strip Use 1 each daily Testing once daily Accu Check Lashae Plus ALYCE BLOUNT test strips 100 each 3    liothyronine (CYTOMEL) 25 mcg tablet Take 0 5 tablets (12 5 mcg total) by mouth daily      Multiple Vitamins-Minerals (MULTIVITAMIN ADULT PO) Take by mouth      NON FORMULARY       levothyroxine (Synthroid) 88 mcg tablet Take 1 tablet (88 mcg total) by mouth daily 90 tablet 1     No current facility-administered medications for this visit  Allergies   Allergen Reactions    Sulfa Antibiotics Shortness Of Breath      Immunizations: There is no immunization history on file for this patient  Health Maintenance:         Topic Date Due    Cervical Cancer Screening  03/01/2023    DXA SCAN  02/12/2025    Hepatitis C Screening  Completed     There are no preventive care reminders to display for this patient  Medicare Health Risk Assessment:     /80   Pulse 85   Temp 98 4 °F (36 9 °C)   Resp 16   Ht 5' 7" (1 702 m)   Wt 86 5 kg (190 lb 12 8 oz)   SpO2 98%   BMI 29 88 kg/m²      Mariah is here for her Subsequent Wellness visit  Health Risk Assessment:   Patient rates overall health as good  Patient feels that their physical health rating is slightly better  Patient is satisfied with their life  Eyesight was rated as same  Hearing was rated as same  Patient feels that their emotional and mental health rating is same  Patients states they are never, rarely angry   Patient states they are never, rarely unusually tired/fatigued  Pain experienced in the last 7 days has been none  Patient states that she has experienced no weight loss or gain in last 6 months  Depression Screening:   PHQ-2 Score: 0      Fall Risk Screening: In the past year, patient has experienced: no history of falling in past year      Urinary Incontinence Screening:   Patient has not leaked urine accidently in the last six months  Home Safety:  Patient does not have trouble with stairs inside or outside of their home  Patient has working smoke alarms and has working carbon monoxide detector  Home safety hazards include: none  Nutrition:   Current diet is Regular  Activities of Daily Living (ADLs)/Instrumental Activities of Daily Living (IADLs):   Walk and transfer into and out of bed and chair?: Yes  Dress and groom yourself?: Yes    Bathe or shower yourself?: Yes    Feed yourself? Yes  Do your laundry/housekeeping?: Yes  Manage your money, pay your bills and track your expenses?: Yes  Make your own meals?: Yes    Do your own shopping?: Yes    Advance Care Planning:     Advanced directive: Yes    Advanced directive counseling given: Yes    Five wishes given: Yes    End of Life Decisions reviewed with patient: Yes      Comments: Medical POA daughter Mack Rosa, if there is hope do something, if there is no hope DNR   She states she has talked to her and wants her daughter to make the decision         Cognitive Screening:   Provider or family/friend/caregiver concerned regarding cognition?: No    PREVENTIVE SCREENINGS      Cardiovascular Screening:    General: Screening Current      Diabetes Screening:     General: Screening Current      Colorectal Cancer Screening:     General: Screening Not Indicated      Breast Cancer Screening:     General: Screening Not Indicated      Cervical Cancer Screening:    General: Screening Not Indicated      Osteoporosis Screening:    General: Screening Current Abdominal Aortic Aneurysm (AAA) Screening:        General: Screening Not Indicated      Lung Cancer Screening:     General: Screening Not Indicated      Hepatitis C Screening:    General: Screening Current    Screening, Brief Intervention, and Referral to Treatment (SBIRT)    Screening  Typical number of drinks in a day: 0  Typical number of drinks in a week: 0  Interpretation: Low risk drinking behavior  Single Item Drug Screening:  How often have you used an illegal drug (including marijuana) or a prescription medication for non-medical reasons in the past year? never    Single Item Drug Screen Score: 0  Interpretation: Negative screen for possible drug use disorder      Enmanuel Amador DO  Virtual AWV Consent    Verification of patient location:    Patient is located in the following state in which I hold an active license { amb virtual patient location:[address]}    Reason for visit is ***    Encounter provider Enmanuel Amador DO    Provider located at [address] [place] [id number] [phone]      Recent Visits  No visits were found meeting these conditions  Showing recent visits within past 7 days and meeting all other requirements  Today's Visits  Date Type Provider Dept   02/03/22 Office Visit Greta Gallegos DO Pg  [facility] today's visits and meeting all other requirements  Future Appointments  No visits were found meeting these conditions  Showing future appointments within next 150 days and meeting all other requirements       After connecting through [employer], the patient was identified by name and date of birth  Mariah JAYY Juvencio Salmeron was informed that this is a telemedicine visit and that the visit is being conducted through {AMB VIRTUAL VISIT NGCBAT:89500}  {Telemedicine confidentiality :59293} {Telemedicine participants:91688}  She acknowledged consent and understanding of privacy and security of the video platform  Mariah Mendiola verbally agrees to participate in [employer]  Pt is aware that [employer] could be limited without vital signs or the ability to perform a full hands-on physical exam  Mariah Roblero understands she or the provider may request at any time to terminate the video visit and request the patient to seek care or treatment in person  Patient is aware this is a billable service

## 2022-02-04 NOTE — PROGRESS NOTES
Assessment and Plan:     Problem List Items Addressed This Visit        Digestive    Irritable bowel syndrome with both constipation and diarrhea    Relevant Medications    levothyroxine (Synthroid) 88 mcg tablet    Other Relevant Orders    Comprehensive metabolic panel    Lipid panel    TSH, 3rd generation with Free T4 reflex    Hemoglobin A1C    Vitamin D 25 hydroxy    TSH, 3rd generation    T3, free    T4, free    C-reactive protein    Homocysteine, serum       Endocrine    Acquired hypothyroidism    Relevant Medications    levothyroxine (Synthroid) 88 mcg tablet    Other Relevant Orders    Comprehensive metabolic panel    Lipid panel    TSH, 3rd generation with Free T4 reflex    Hemoglobin A1C    Vitamin D 25 hydroxy    TSH, 3rd generation    T3, free    T4, free    C-reactive protein    Homocysteine, serum    IFG (impaired fasting glucose)    Relevant Medications    levothyroxine (Synthroid) 88 mcg tablet    Other Relevant Orders    Comprehensive metabolic panel    Lipid panel    TSH, 3rd generation with Free T4 reflex    Hemoglobin A1C    Vitamin D 25 hydroxy    TSH, 3rd generation    T3, free    T4, free    C-reactive protein    Homocysteine, serum       Cardiovascular and Mediastinum    PAD (peripheral artery disease) (HCC)       Other    Fibromyalgia    Relevant Medications    levothyroxine (Synthroid) 88 mcg tablet    Other Relevant Orders    Comprehensive metabolic panel    Lipid panel    TSH, 3rd generation with Free T4 reflex    Hemoglobin A1C    Vitamin D 25 hydroxy    TSH, 3rd generation    T3, free    T4, free    C-reactive protein    Homocysteine, serum    Class 1 obesity due to excess calories with serious comorbidity and body mass index (BMI) of 32 0 to 32 9 in adult    Relevant Medications    levothyroxine (Synthroid) 88 mcg tablet      Other Visit Diagnoses     Encounter for Medicare annual wellness exam    -  Primary    Hypoalbuminemia        Bilateral hand pain        Relevant Orders Cock Up Wrist Splint    Paresthesia of skin         Relevant Orders    Homocysteine, serum      Declines vaccines   5 wishes given  Update blood work before next visit  Depression Screening and Follow-up Plan: Patient was screened for depression during today's encounter  They screened negative with a PHQ-2 score of 0  Preventive health issues were discussed with patient, and age appropriate screening tests were ordered as noted in patient's After Visit Summary  Personalized health advice and appropriate referrals for health education or preventive services given if needed, as noted in patient's After Visit Summary       History of Present Illness:     Patient presents for Medicare Annual Wellness visit    Patient Care Team:  Greta Gallegos DO as PCP - General (Family Medicine)  Rose Cardoso MD     Problem List:     Patient Active Problem List   Diagnosis    Arthritis    Acquired hypothyroidism    Fibromyalgia    Costochondritis    Chronic bilateral thoracic back pain    IFG (impaired fasting glucose)    Irritable bowel syndrome with both constipation and diarrhea    History of basal cell carcinoma (BCC) of skin    Class 1 obesity due to excess calories with serious comorbidity and body mass index (BMI) of 32 0 to 32 9 in adult    Fibrocystic breast disease    No vaccination-pt refuse    Breast mass, left    Counseling regarding advanced directives    PAD [facility]peripheral artery disease) ([facility]      Past Medical and Surgical History:     Past Medical History:   Diagnosis Date    Arthritis     Basal cell carcinoma     Breast lump     Cancer ([address]     Disease of thyroid gland     Kidney stone     Stone     Past Surgical History:   Procedure Laterality Date    BELPHAROPTOSIS REPAIR      BREAST BIOPSY Left     BREAST CYST EXCISION Left     BREAST SURGERY      Puncture aspiration of a cyst    COLONOSCOPY      JOINT REPLACEMENT      both hips    SKIN LESION EXCISION      Back, Lt Face    TONSILLECTOMY      TOTAL HIP ARTHROPLASTY Bilateral     Impression 16    WISDOM TOOTH EXTRACTION        Family History:     Family History   Problem Relation Age of Onset    Diabetes Father     Hearing loss Father     Uterine cancer Family     Stroke Mother     Hearing loss Mother     Vision loss Mother     Thyroid disease Mother     No Known Problems Brother     No Known Problems Daughter     Endometrial cancer Maternal Grandmother     Heart disease Neg Hx       Social History:     Social History     Socioeconomic History    Marital status: /Civil Union     Spouse name: None    Number of children: 1    Years of education: None    Highest education level: None   Occupational History    None   Tobacco Use    Smoking status: Former Smoker     Packs/day: 1      Years: 6      Pack years: 6      Types: Cigarettes     Start date: 1960     Quit date: 1965     Years since quittin 4    Smokeless tobacco: Never Used    Tobacco comment: 8 years smoking per [employer] Vaping Use: Never used   Substance and Sexual Activity    Alcohol use:  Yes     Alcohol/week: 0 0 standard drinks     Comment: wine with dinner occasionally    Drug use: No    Sexual activity: Yes     Partners: Male     Birth control/protection: None   Other Topics Concern    None   Social History Narrative    Lives with her  José Manuel  > 45 years     3 children live locally - one hers, 2 her husbands (one son estranged for 25 years) - 8 grandchildren, 3 great grandchildren          Never a smoker - As per [employer]    No alcohol use - As per [employer]         Most recent tobacco use screenin2018    Advance directive: Yes 12/3/19    Live alone or with others: with others    Marital status:     Are you currently employed: No    Alcohol intake: None    Chewing tobacco: none    Per [place]     Social Determinants of Health     Financial Resource Strain: Not on [employer] Insecurity: Not on file   Transportation Needs: Not on file   Physical Activity: Not on file   Stress: Not on file   Social Connections: Not on file   Intimate Partner Violence: Not on file   Housing Stability: Not on file      Medications and Allergies:     Current Outpatient Medications   Medication Sig Dispense Refill    Accu-Chek Softclix Lancets lancets Use daily Testing once daily 100 each 3    Cholecalciferol (Vitamin D3) 10 MCG/ML LIQD Take 6,000 Units by mouth daily      glucose blood test strip Use 1 each daily Testing once daily Accu Check Lashae Plus ALYCE BLOUNT test strips 100 each 3    liothyronine (CYTOMEL) 25 mcg tablet Take 0 5 tablets (12 5 mcg total) by mouth daily      Multiple Vitamins-Minerals (MULTIVITAMIN ADULT PO) Take by mouth      NON FORMULARY       levothyroxine (Synthroid) 88 mcg tablet Take 1 tablet (88 mcg total) by mouth daily 90 tablet 1     No current facility-administered medications for this visit  Allergies   Allergen Reactions    Sulfa Antibiotics Shortness Of Breath      Immunizations: There is no immunization history on file for this patient  Health Maintenance:         Topic Date Due    Cervical Cancer Screening  03/01/2023    DXA SCAN  02/12/2025    Hepatitis C Screening  Completed     There are no preventive care reminders to display for this patient  Medicare Health Risk Assessment:     /80   Pulse 85   Temp 98 4 °F (36 9 °C)   Resp 16   Ht 5' 7" (1 702 m)   Wt 86 5 kg (190 lb 12 8 oz)   SpO2 98%   BMI 29 88 kg/m²      Mariah is here for her Subsequent Wellness visit  Last Medicare Wellness visit information reviewed, patient interviewed and updates made to the record today  Health Risk Assessment:   Patient rates overall health as good  Patient feels that their physical health rating is slightly better  Patient is satisfied with their life  Eyesight was rated as same  Hearing was rated as same   Patient feels that their emotional and mental health rating is same  Patients states they are never, rarely angry  Patient states they are never, rarely unusually tired/fatigued  Pain experienced in the last 7 days has been none  Patient states that she has experienced no weight loss or gain in last 6 months  Depression Screening:   PHQ-2 Score: 0      Fall Risk Screening: In the past year, patient has experienced: no history of falling in past year      Urinary Incontinence Screening:   Patient has not leaked urine accidently in the last six months  Home Safety:  Patient does not have trouble with stairs inside or outside of their home  Patient has working smoke alarms and has working carbon monoxide detector  Home safety hazards include: none  Nutrition:   Current diet is Regular  Medications:   Patient is currently taking over-the-counter supplements  OTC medications include: see medication list  Patient is able to manage medications  Activities of Daily Living (ADLs)/Instrumental Activities of Daily Living (IADLs):   Walk and transfer into and out of bed and chair?: Yes  Dress and groom yourself?: Yes    Bathe or shower yourself?: Yes    Feed yourself? Yes  Do your laundry/housekeeping?: Yes  Manage your money, pay your bills and track your expenses?: Yes  Make your own meals?: Yes    Do your own shopping?: Yes    Previous Hospitalizations:   Any hospitalizations or ED visits within the last 12 months?: No      Advance Care Planning:   Living will: Yes    Durable POA for healthcare: Yes    Advanced directive: Yes    Advanced directive counseling given: Yes    Five wishes given: Yes      Comments: Medical POA daughter Armand Lancaster, if there is hope do something, if there is no hope DNR  She states she has talked to her and wants her daughter to make the decision       Cognitive Screening:   Provider or family/friend/caregiver concerned regarding cognition?: No    PREVENTIVE SCREENINGS      Cardiovascular Screening:    General: Screening Current      Diabetes Screening:     General: Screening Current      Colorectal Cancer Screening:     General: Screening Not Indicated      Breast Cancer Screening:     General: Screening Not Indicated      Cervical Cancer Screening:    General: Screening Not Indicated      Osteoporosis Screening:    General: Screening Current      Abdominal Aortic Aneurysm (AAA) Screening:        General: Screening Not Indicated      Lung Cancer Screening:     General: Screening Not Indicated      Hepatitis C Screening:    General: Screening Current    Screening, Brief Intervention, and Referral to Treatment (SBIRT)    Screening      AUDIT-C Screenin) How often did you have a drink containing alcohol in the past year? never  2) How many drinks did you have on a typical day when you were drinking in the past year? 0  3) How often did you have 6 or more drinks on one occasion in the past year? never    AUDIT-C Score: 0  Interpretation: Score 0-2 (female): Negative screen for alcohol misuse      Chick Adri, DO

## 2022-03-16 ENCOUNTER — TELEPHONE (OUTPATIENT)
Dept: FAMILY MEDICINE CLINIC | Facility: CLINIC | Age: 77
End: 2022-03-16

## 2022-03-16 NOTE — TELEPHONE ENCOUNTER
Please tell pt that it was not covered - I told her it might not be covered when I ordered it   She wanted it anyway

## 2022-03-16 NOTE — TELEPHONE ENCOUNTER
Received notice from Austyn Lancaster that patients CPT code was denied: 99454 (Thyroid hormone T3)   (ICD 10: Z11 59,E88 09,E03 9)  Date of Service: 1/27/2022  Will route to provider to see if additional diagnosis codes can be submitted       Acquired hypothyroidism E03 9   Hypoalbuminemia E88 09  Diagnosis of encounter for screening for other viral disease Z11 59

## 2022-07-29 ENCOUNTER — RA CDI HCC (OUTPATIENT)
Dept: OTHER | Facility: HOSPITAL | Age: 77
End: 2022-07-29

## 2022-07-29 NOTE — PROGRESS NOTES
[address]  coding opportunities       Chart reviewed, no opportunity found: CHART REVIEWED, NO OPPORTUNITY FOUND        Patients Insurance     Medicare Insurance: Medicare

## 2022-08-01 LAB — HBA1C MFR BLD HPLC: 5.6 %

## 2022-08-04 ENCOUNTER — OFFICE VISIT (OUTPATIENT)
Dept: FAMILY MEDICINE CLINIC | Facility: CLINIC | Age: 77
End: 2022-08-04
Payer: COMMERCIAL

## 2022-08-04 VITALS
DIASTOLIC BLOOD PRESSURE: 64 MMHG | RESPIRATION RATE: 16 BRPM | TEMPERATURE: 97.8 F | SYSTOLIC BLOOD PRESSURE: 120 MMHG | HEIGHT: 67 IN | OXYGEN SATURATION: 97 % | WEIGHT: 190 LBS | BODY MASS INDEX: 29.82 KG/M2 | HEART RATE: 81 BPM

## 2022-08-04 DIAGNOSIS — E66.3 OVERWEIGHT (BMI 25.0-29.9): ICD-10-CM

## 2022-08-04 DIAGNOSIS — R73.01 IFG (IMPAIRED FASTING GLUCOSE): ICD-10-CM

## 2022-08-04 DIAGNOSIS — M79.7 FIBROMYALGIA: ICD-10-CM

## 2022-08-04 DIAGNOSIS — M94.0 COSTOCHONDRITIS: ICD-10-CM

## 2022-08-04 DIAGNOSIS — E03.9 ACQUIRED HYPOTHYROIDISM: Primary | ICD-10-CM

## 2022-08-04 PROCEDURE — 99214 OFFICE O/P EST MOD 30 MIN: CPT | Performed by: FAMILY MEDICINE

## 2022-08-04 RX ORDER — LEVOTHYROXINE SODIUM 100 MCG
50 TABLET ORAL
Qty: 30 TABLET | Refills: 5
Start: 2022-08-04

## 2022-08-04 RX ORDER — HYDROXYCHLOROQUINE SULFATE 200 MG/1
TABLET, FILM COATED ORAL
COMMUNITY
Start: 2022-06-21 | End: 2022-08-04

## 2022-08-04 RX ORDER — LIOTHYRONINE SODIUM 50 UG/1
50 TABLET ORAL DAILY
Status: CANCELLED
Start: 2022-08-04

## 2022-08-04 RX ORDER — LEVOTHYROXINE SODIUM 75 MCG
TABLET ORAL
COMMUNITY
Start: 2022-06-21 | End: 2022-08-04

## 2022-08-04 RX ORDER — LEVOTHYROXINE SODIUM 100 MCG
50 TABLET ORAL DAILY
COMMUNITY
Start: 2022-06-21 | End: 2022-08-04

## 2022-08-04 NOTE — PROGRESS NOTES
1  Acquired hypothyroidism  All of blood work not back  Will review when it is  Pt asking if she should stay on cytomel  I again advised that I do not recommend this  She can stop this  Would repeat labs in 8 - 12 weeks and again before visit in  Months  Can adjust the synthroid if needed  Advised sometimes this low dose of cytomel does have positive affect on mood  Will watch this also  - Synthroid 100 MCG tablet; Take 0 5 tablets (50 mcg total) by mouth daily in the early morning  Dispense: 30 tablet; Refill: 5  - TSH, 3rd generation; Future  - T3, free; Future  - T4, free; Future  - TSH, 3rd generation; Future  - T3, free; Future  - T4, free; Future    2  Fibromyalgia  Pt feels this is stable  3  Costochondritis  Some flares but overall stable     4  Overweight (BMI 25 0-29  9)  Doing well with weight loss   Eating well, very active  5  IFG (impaired fasting glucose)  Awaiting full lab results       Return in about 6 months (around 2/4/2023) for Medicare Wellness Visit  Subjective:   Mariah is a 76 y o  female here today for a follow-up on her current medical conditions:    Patient Active Problem List   Diagnosis    Arthritis    Acquired hypothyroidism    Fibromyalgia    Costochondritis    Chronic bilateral thoracic back pain    IFG (impaired fasting glucose)    Irritable bowel syndrome with both constipation and diarrhea    History of basal cell carcinoma (BCC) of skin    Overweight (BMI 25 0-29  9)    Fibrocystic breast disease    No vaccination-pt refuse    Breast mass, left    Counseling regarding advanced directives    [facility]per[facility] disease) ([facility] )       Patient Care Team:  Mariam Heard DO as PCP - General (Family Medicine)  Nicole Reid MD    Current Medications:  Current Outpatient Medications   Medication Sig Dispense Refill    Accu-Chek Softclix Lancets lancets Use daily Testing once daily 100 each 3    Cholecalciferol (Vitamin D3) 10 MCG/ML LIQD Take 6,000 Units by mouth daily      glucose blood test strip Use 1 each daily Testing once daily Accu Check Lashae Plus ALYCE BLOUNT test strips 100 each 3    Multiple Vitamins-Minerals (MULTIVITAMIN ADULT PO) Take by mouth      NON FORMULARY       Synthroid 100 MCG tablet Take 0 5 tablets (50 mcg total) by mouth daily in the early morning 30 tablet 5     No current facility-administered medications for this visit  HPI:  Chief Complaint   Patient presents with    Follow-up     6 month f/u CC  No concerns    [facility]     [facility]- will [facility]     Medication Refill     None         -- Above per clinical staff and reviewed  --    PHQ-2/9 Depression Screening         ? She is seeing  Dr Tree Lewis - he prescribes cytomel    Today she asks my opinion on this and if I think she should stay on it      for next visit CMP A1C TSH lipids CRP homeocysteine requested vit D   1/27/22 - TSH 0 055, calcium 1 21, free T4 1/15, free T3 2 77, prealbumin 20 4   Jul y labs in chart from Dr Tre Sanchez - fbs 102, Jose calcium 1  0 2  last visit referred to PT  Miralax recommended  mood reviewed - was thinking of a supplement for mood  consider Cymbalta   20 mg  ortho right ankle OA - PT ice orthics   Today:  Pt here today with her   Doing well   Labs were not received today   Going to [place] [facility] [place] to [facility]   Sinus pressure  Left ear   Tightness is there, but pain is better  pickleball   Exercising         The following portions of the patient's history were reviewed and updated as appropriate: allergies, current medications, past family history, past medical history, past social history, past surgical history and problem list     Objective:  Vitals:  /64   Pulse 81   Temp 97 8 °F (36 6 °C)   Resp 16   Ht 5' 7" (1 702 m)   Wt 86 2 kg (190 lb)   SpO2 97%   BMI 29 76 kg/m²    Wt Readings from Last 3 Encounters:   08/04/22 86 2 kg (190 lb)   02/03/22 86 5 kg (190 lb 12 8 oz)   08/27/21 89 8 kg (198 lb) BP Readings from Last 3 Encounters:   08/04/22 120/64   02/03/22 134/80   08/27/21 128/80        Review of Systems   She has no other concerns  No unexpected weight changes  No chest pain, SOB, or palpitations  No GERD  No changes in bowels or bladder  Sleeping well  No mood changes  Physical Exam   Constitutional:  she appears well-developed and well-nourished  HENT: Head: Normocephalic  Neck: Neck supple  Cardiovascular: Normal rate, regular rhythm and normal heart sounds  Pulmonary/Chest: Effort normal and breath sounds normal  No wheezes, rales, or rhonchi  Abdominal: Soft  Bowel sounds are normal  There is no tenderness  No hepatosplenomegaly  Musculoskeletal: she exhibits no edema  Lymphadenopathy: she has no cervical adenopathy  Neurological: she is alert and oriented to person, place, and time  Skin: Skin is warm and dry  Psychiatric: she has a normal mood and affect   her behavior is normal  Thought content normal

## 2022-08-05 LAB
CRP SERPL-MCNC: 5 MG/L
HCYS SERPL-SCNC: 11.2 UMOL/L

## 2022-08-07 PROBLEM — E66.3 OVERWEIGHT (BMI 25.0-29.9): Status: ACTIVE | Noted: 2020-02-27

## 2022-08-08 NOTE — RESULT ENCOUNTER NOTE
Please call [facility] - pt had other blood work ordered and it was not received (thyroid levels) she said she was able to see it but I cannot  I also ordered other blood work in Feb that I do not have results to  Please see if they will fax us results

## 2022-08-25 ENCOUNTER — OFFICE VISIT (OUTPATIENT)
Dept: SURGERY | Facility: CLINIC | Age: 77
End: 2022-08-25
Payer: COMMERCIAL

## 2022-08-25 VITALS
TEMPERATURE: 98.5 F | WEIGHT: 194 LBS | BODY MASS INDEX: 30.45 KG/M2 | OXYGEN SATURATION: 98 % | HEART RATE: 80 BPM | HEIGHT: 67 IN | SYSTOLIC BLOOD PRESSURE: 126 MMHG | DIASTOLIC BLOOD PRESSURE: 82 MMHG | RESPIRATION RATE: 18 BRPM

## 2022-08-25 DIAGNOSIS — N60.19 FIBROCYSTIC BREAST DISEASE: Primary | ICD-10-CM

## 2022-08-25 DIAGNOSIS — D17.1 LIPOMA OF BACK: ICD-10-CM

## 2022-08-25 PROCEDURE — 99213 OFFICE O/P EST LOW 20 MIN: CPT | Performed by: SURGERY

## 2022-08-25 PROCEDURE — 1160F RVW MEDS BY RX/DR IN RCRD: CPT | Performed by: SURGERY

## 2022-08-25 NOTE — PROGRESS NOTES
Assessment/Plan:  History of fibrocystic breast disease  No evidence of need for biopsy at the present time    No problem-specific Assessment & Plan notes found for this encounter  There are no diagnoses linked to this encounter  Subjective:      Patient ID: Mariah Pena is a 76 y o  female  The patient is a 25-year-old female who is status post an open breast biopsy as well as multiple ultrasounds and mammograms over the years  For about the last 10-15 years she has categorically refused to have a mammogram   When she came in last year there was a slight change in the physical exam so I sent her for an ultrasound which was negative  She comes in today for regularly scheduled breast exam   She has no complaints relative to her breast      The following portions of the patient's history were reviewed and updated as appropriate: allergies, current medications, past family history, past medical history, past social history, past surgical history and problem list     Review of Systems   irritable bowel, hypothyroidism, arthritis, costochondritis is, fibromyalgia, fibrocystic breast disease    Objective:      /82   Pulse 80   Temp 98 5 °F (36 9 °C) (Tympanic)   Resp 18   Ht 5' 7" (1 702 m)   Wt 88 kg (194 lb)   SpO2 98%   BMI 30 38 kg/m²          Physical Exam  Vitals reviewed  Constitutional:       Appearance: Normal appearance  She is obese  She is not ill-appearing  Comments: She is minimally obese   HENT:      Head: Normocephalic and atraumatic  Eyes:      General: No scleral icterus  Conjunctiva/sclera: Conjunctivae normal    Cardiovascular:      Rate and Rhythm: Normal rate and regular rhythm  Heart sounds: Normal heart sounds  No murmur heard  Pulmonary:      Effort: No respiratory distress  Breath sounds: No stridor  No wheezing, rhonchi or rales  Comments: [place] mass which is soft    Patient is claiming that this is been there for years  Chest: Chest wall: Mass (The patient has a 10 cm long soft oblong mass of the right upper back  This is a lipoma) present  Comments: She still has the fullness on the left side lateral but there is no discrete mass    this is probably fat     Scar is as shown  Musculoskeletal:         General: Normal range of motion  Lymphadenopathy:      Cervical: No cervical adenopathy  Skin:     General: Skin is warm and dry  Neurological:      Mental Status: She is oriented to person, place, and time  Psychiatric:         Mood and Affect: Mood normal          Behavior: Behavior normal          Thought Content:  Thought content normal          Judgment: Judgment normal

## 2022-08-25 NOTE — PATIENT INSTRUCTIONS
She should have a mammogram now but is again refusing  Same for next year    I offered her excision of the lipoma but she is refusing

## 2022-10-26 LAB
T3FREE SERPL-MCNC: 3.9 PG/ML (ref 2.3–4.2)
T4 FREE SERPL-MCNC: 0.8 NG/DL (ref 0.8–1.8)
TSH SERPL-ACNC: 1.84 MIU/L (ref 0.4–4.5)

## 2022-10-27 ENCOUNTER — PATIENT MESSAGE (OUTPATIENT)
Dept: FAMILY MEDICINE CLINIC | Facility: CLINIC | Age: 77
End: 2022-10-27

## 2022-10-27 DIAGNOSIS — E03.9 ACQUIRED HYPOTHYROIDISM: ICD-10-CM

## 2022-11-15 RX ORDER — LIOTHYRONINE SODIUM 25 UG/1
12 TABLET ORAL DAILY
Qty: 30 TABLET | Refills: 0 | Status: CANCELLED | OUTPATIENT
Start: 2022-11-15

## 2022-11-15 NOTE — PATIENT COMMUNICATION
Phone call placed to patient, Patient states that she has been taking 75 mg twice aweek as well  I advise patient that you wanted her taking 50 mch daily  Please advise  Patient also needs a refill on her liothyronine (CYTOMEL) 25 mcg tablet patient takes 12 5mcg   States that her old provider use to fill this and that she can not just stop taking it   Please advise thank you

## 2022-11-16 RX ORDER — LEVOTHYROXINE SODIUM 100 MCG
TABLET ORAL
Qty: 30 TABLET | Refills: 5 | Status: SHIPPED | OUTPATIENT
Start: 2022-11-16 | End: 2022-11-17 | Stop reason: SDUPTHER

## 2022-11-17 DIAGNOSIS — E03.9 ACQUIRED HYPOTHYROIDISM: ICD-10-CM

## 2022-11-17 RX ORDER — LEVOTHYROXINE SODIUM 100 MCG
TABLET ORAL
Qty: 30 TABLET | Refills: 5 | Status: SHIPPED | OUTPATIENT
Start: 2022-11-17

## 2022-11-17 NOTE — TELEPHONE ENCOUNTER
Phone call placed to pt  Reviewed all of Dr Ho's documentation stating pt should d/c Cytomel  Pt states she never discussed this with Dr Ho and that she would not have agreed to d/c this medication  Pt is very upset and frustrated  States "per my research, you cannot just stop this medication cold turkey, you should be weaned off " Pt states she has never stopped this medication and does not plan to  Would like to know why she was told to stop this medication without a tapering plan  States she will need to find a new doctor that will order this medication for her  There are multiple messages regarding this and pt states she was never told to stop this medication by Dr Ho

## 2022-11-17 NOTE — TELEPHONE ENCOUNTER
Dr Reagan Glass recommended stopping the cytomel in August and has reiterated that she should not be taking it

## 2022-11-17 NOTE — TELEPHONE ENCOUNTER
Medication was sent to the wrong pharmacy  Medication sent to right pharmacy          Patient also needs a refill on her liothyronine (CYTOMEL) 25 mcg tablet patient takes 12 5mcg  Patient states that she CAN NOT just stop taking this medication  States that her old provider use to fill this and that she can not just stop taking it   Please advise thank you

## 2022-11-18 RX ORDER — LIOTHYRONINE SODIUM 25 UG/1
12 TABLET ORAL DAILY
Qty: 15 TABLET | Refills: 0 | Status: SHIPPED | OUTPATIENT
Start: 2022-11-18

## 2022-11-18 NOTE — TELEPHONE ENCOUNTER
Per Dr Bushra Ruiz Note     She can just stop it and she is welcome to find another doctor if she feels this is best for her  Phone call placed to patient states that she want 5-10 to wean her self off of it  States that she prayed about this and has chosen to come off this medication, But will feel better if better if she is weaned

## 2023-02-23 ENCOUNTER — APPOINTMENT (OUTPATIENT)
Dept: LAB | Facility: HOSPITAL | Age: 78
End: 2023-02-23

## 2023-02-23 DIAGNOSIS — I51.9 MYXEDEMA HEART DISEASE: ICD-10-CM

## 2023-02-23 DIAGNOSIS — E03.9 MYXEDEMA HEART DISEASE: ICD-10-CM

## 2023-02-23 LAB
T3 SERPL-MCNC: 1.7 NG/ML (ref 0.6–1.8)
TSH SERPL DL<=0.05 MIU/L-ACNC: 0.72 UIU/ML (ref 0.45–4.5)

## 2023-03-28 ENCOUNTER — APPOINTMENT (OUTPATIENT)
Dept: LAB | Facility: HOSPITAL | Age: 78
End: 2023-03-28

## 2023-03-28 DIAGNOSIS — I51.9 MYXEDEMA HEART DISEASE: ICD-10-CM

## 2023-03-28 DIAGNOSIS — E03.9 MYXEDEMA HEART DISEASE: ICD-10-CM

## 2023-03-28 LAB
T3 SERPL-MCNC: 1.4 NG/ML (ref 0.6–1.8)
T4 FREE SERPL-MCNC: 1 NG/DL (ref 0.76–1.46)
T4 SERPL-MCNC: 9.2 UG/DL (ref 4.7–13.3)
TSH SERPL DL<=0.05 MIU/L-ACNC: 0.35 UIU/ML (ref 0.45–4.5)

## 2023-05-25 ENCOUNTER — APPOINTMENT (OUTPATIENT)
Dept: LAB | Facility: HOSPITAL | Age: 78
End: 2023-05-25

## 2023-05-25 DIAGNOSIS — E03.9 ACQUIRED HYPOTHYROIDISM: ICD-10-CM

## 2023-05-25 LAB
T3 SERPL-MCNC: 1.2 NG/ML
T3FREE SERPL-MCNC: 3.53 PG/ML (ref 2.5–3.9)
T4 FREE SERPL-MCNC: 0.8 NG/DL (ref 0.61–1.12)
T4 SERPL-MCNC: 8.21 UG/DL (ref 6.09–12.23)
TSH SERPL DL<=0.05 MIU/L-ACNC: 0.81 UIU/ML (ref 0.45–4.5)

## 2023-05-26 ENCOUNTER — TELEPHONE (OUTPATIENT)
Dept: FAMILY MEDICINE CLINIC | Facility: CLINIC | Age: 78
End: 2023-05-26

## 2023-05-30 NOTE — TELEPHONE ENCOUNTER
Spoke with patient this morning, she is debating on who she wants as her PCP so she will contact us by the end of the week to let us know her decision

## 2023-08-22 ENCOUNTER — APPOINTMENT (OUTPATIENT)
Dept: LAB | Facility: HOSPITAL | Age: 78
End: 2023-08-22
Payer: COMMERCIAL

## 2023-08-22 DIAGNOSIS — I51.9 MYXEDEMA HEART DISEASE: ICD-10-CM

## 2023-08-22 DIAGNOSIS — E03.9 MYXEDEMA HEART DISEASE: ICD-10-CM

## 2023-08-22 LAB
T3 SERPL-MCNC: 0.9 NG/ML
T4 SERPL-MCNC: 8.71 UG/DL (ref 6.09–12.23)
TSH SERPL DL<=0.05 MIU/L-ACNC: 7.13 UIU/ML (ref 0.45–4.5)

## 2023-08-22 PROCEDURE — 36415 COLL VENOUS BLD VENIPUNCTURE: CPT

## 2023-08-22 PROCEDURE — 84480 ASSAY TRIIODOTHYRONINE (T3): CPT

## 2023-08-22 PROCEDURE — 84443 ASSAY THYROID STIM HORMONE: CPT

## 2023-08-22 PROCEDURE — 84436 ASSAY OF TOTAL THYROXINE: CPT

## 2023-09-08 ENCOUNTER — OFFICE VISIT (OUTPATIENT)
Dept: FAMILY MEDICINE CLINIC | Facility: CLINIC | Age: 78
End: 2023-09-08
Payer: COMMERCIAL

## 2023-09-08 VITALS
HEIGHT: 67 IN | WEIGHT: 202.38 LBS | OXYGEN SATURATION: 98 % | RESPIRATION RATE: 18 BRPM | DIASTOLIC BLOOD PRESSURE: 80 MMHG | BODY MASS INDEX: 31.76 KG/M2 | SYSTOLIC BLOOD PRESSURE: 128 MMHG | TEMPERATURE: 98.6 F | HEART RATE: 82 BPM

## 2023-09-08 DIAGNOSIS — E03.9 ACQUIRED HYPOTHYROIDISM: Primary | ICD-10-CM

## 2023-09-08 DIAGNOSIS — M94.0 COSTOCHONDRITIS: ICD-10-CM

## 2023-09-08 DIAGNOSIS — I73.9 PAD (PERIPHERAL ARTERY DISEASE) (HCC): ICD-10-CM

## 2023-09-08 DIAGNOSIS — F41.9 ANXIETY: ICD-10-CM

## 2023-09-08 DIAGNOSIS — M79.7 FIBROMYALGIA: ICD-10-CM

## 2023-09-08 DIAGNOSIS — M62.838 MUSCLE SPASM: ICD-10-CM

## 2023-09-08 PROCEDURE — 99214 OFFICE O/P EST MOD 30 MIN: CPT | Performed by: FAMILY MEDICINE

## 2023-09-08 RX ORDER — LEVOTHYROXINE SODIUM 75 MCG
TABLET ORAL
COMMUNITY
Start: 2023-08-15 | End: 2023-09-08

## 2023-09-08 NOTE — PROGRESS NOTES
1. Acquired hypothyroidism  Assessment & Plan:  Again reviewed signs/symptoms of thyroid and her current symptoms. Dr. Tiara Almazan changed her dose based on her current blood work, but her Free T3 and Free T4 were normal. She is not taking Cytomel and again asks me pros and cons of taking this. I again reviewed guidelines on this regarding thyroid regulation (levothyroxine only) and small evidence for Cytomel for mood. She will continue on current dose of Synthroid brand name and repeat labs after 6 - 8 weeks. She was again advised she can only have one PCP. Orders:  -     TSH, 3rd generation; Future  -     T3, free; Future; Expected date: 10/08/2023  -     T4, free; Future  -     Synthroid 100 MCG tablet; Take 1 tablet (100 mcg total) by mouth daily BRAND NECESSARY    2. PAD (peripheral artery disease) (HCC)  Assessment & Plan:  No symptoms. No changes. 3. Muscle spasm  -     Ambulatory Referral to Physical Therapy; Future    4. Fibromyalgia  Assessment & Plan:  Reassurance given. Recommend PT. 5. Costochondritis  Assessment & Plan:  Reassurance given. Recommend PT. 6. Anxiety         Return for Keep scheduled appointment. Subjective:   Mariah is a 78 y.o. female here today for a follow-up on her current medical conditions:    Patient Active Problem List   Diagnosis   • Arthritis   • Acquired hypothyroidism   • Fibromyalgia   • Costochondritis   • Chronic bilateral thoracic back pain   • IFG (impaired fasting glucose)   • Irritable bowel syndrome with both constipation and diarrhea   • History of basal cell carcinoma (BCC) of skin   • Overweight (BMI 25.0-29. 9)   • Fibrocystic breast disease   • No vaccination-pt refuse   • Breast mass, left   • Counseling regarding advanced directives   • PAD (peripheral artery disease) (HCC)   • Lipoma of back   • Anxiety       Patient Care Team:  Palma Wade DO as PCP - General (Family Medicine)  Shun Kathleen MD    Current Medications:  Current Outpatient Medications   Medication Sig Dispense Refill   • Accu-Chek Softclix Lancets lancets Use daily Testing once daily 100 each 3   • Cholecalciferol (Vitamin D3) 10 MCG/ML LIQD Take 6,000 Units by mouth daily     • glucose blood test strip Use 1 each daily Testing once daily Accu Check Lashae Plus ALYCE BLOUNT test strips 100 each 3   • Multiple Vitamins-Minerals (MULTIVITAMIN ADULT PO) Take by mouth     • Synthroid 100 MCG tablet Take 1 tablet (100 mcg total) by mouth daily BRAND NECESSARY 30 tablet 5     No current facility-administered medications for this visit. HPI:  Chief Complaint   Patient presents with   • Follow-up     Would like to talk about labs -  thyroid levels     -- Above per clinical staff and reviewed. --    PHQ-2/9 Depression Screening    Little interest or pleasure in doing things: 0 - not at all  Feeling down, depressed, or hopeless: 0 - not at all  PHQ-2 Score: 0  PHQ-2 Interpretation: Negative depression screen       ?     says she is not seeing Dr. Niall De Los Santos anymore   for next visit CMP A1C TSH lipids CRP homeocysteine requested vit D   1/27/22 - TSH 0.055, calcium 1.21, free T4 1/15, free T3 2.77, prealbumin 20.4   Jul.y labs in chart from Dr. Niall De Los Santos - fbs 102, Jose calcium 1  0.2  last visit referred to PT. Miralax recommended. mood reviewed - was thinking of a supplement for mood. consider Cymbalta. 20 mg. ortho right ankle OA - PT ice orthics   Today:  Pt said she would be seen here but again saw Dr. Isamar Cheema who ordered thyroid blood work and  changed her dose - she is here today because she does not understand why her level is going up and down and she feels she has a lot of symptoms - fatigue. Flares of pain/costoconditis. Cannot breath well at times but can play pickle ball for hours.  Was told when she was a kid that her thyroid was not the cause of her symptoms but that her anxiety was making her thyroid blood work abnormal.   Sleeps 5 -6 hours wakes up at 4 am, stays in bed to 6:30 am   Feels good when ruby first wakes up   Then at 10 am feels like she hit a wall   Feels stressed at doctor   Rib out of alignment  Feels tight at times  Exercises  Dad had anxiety   She is no longer seeing Dr. Warren Meyer because he . Played pickleball 2 hours  Takes happy camper supplement and really helps   - sometimes it stops working   When she took cytomel she lost 20 pounds   Thinks she took a medication years ago for anxiety  and had a reaction     The following portions of the patient's history were reviewed and updated as appropriate: allergies, current medications, past family history, past medical history, past social history, past surgical history and problem list.    Objective:  Vitals:  /80   Pulse 82   Temp 98.6 °F (37 °C)   Resp 18   Ht 5' 7" (1.702 m)   Wt 91.8 kg (202 lb 6 oz)   SpO2 98%   BMI 31.70 kg/m²    Wt Readings from Last 3 Encounters:   23 91.8 kg (202 lb 6 oz)   22 88 kg (194 lb)   22 86.2 kg (190 lb)      BP Readings from Last 3 Encounters:   23 128/80   22 126/82   22 120/64        Review of Systems   She has no other concerns. No unexpected weight changes. +  chest pain/rib pain/sternal pain, SOB, or palpitations. No GERD. No changes in bowels or bladder. Sleeping well. No mood changes worries about her health . Physical Exam   Constitutional:  she appears well-developed and well-nourished. HENT: Head: Normocephalic. Neck: Neck supple. Cardiovascular: Normal rate, regular rhythm and normal heart sounds. Pulmonary/Chest: Effort normal and breath sounds normal. No wheezes, rales, or rhonchi. Abdominal: Soft. Bowel sounds are normal. There is no tenderness. No hepatosplenomegaly. Musculoskeletal: she exhibits no edema. Lymphadenopathy: she has no cervical adenopathy. Neurological: she is alert and oriented to person, place, and time. Skin: Skin is warm and dry. Psychiatric: she has a normal mood and anxious affect.  her behavior is normal. Thought content normal.     BMI Counseling: Body mass index is 31.7 kg/m². The BMI is above normal. Nutrition recommendations include decreasing portion sizes. Exercise recommendations include exercising 3-5 times per week. Rationale for BMI follow-up plan is due to patient being overweight or obese. Depression Screening and Follow-up Plan: Patient was screened for depression during today's encounter. They screened negative with a PHQ-2 score of 0.

## 2023-09-10 PROBLEM — F41.9 ANXIETY: Status: ACTIVE | Noted: 2023-09-10

## 2023-09-10 RX ORDER — LEVOTHYROXINE SODIUM 100 MCG
100 TABLET ORAL DAILY
Qty: 30 TABLET | Refills: 5
Start: 2023-09-10 | End: 2023-09-14 | Stop reason: SDUPTHER

## 2023-09-10 NOTE — ASSESSMENT & PLAN NOTE
Again reviewed signs/symptoms of thyroid and her current symptoms. Dr. Phillip Olivares changed her dose based on her current blood work, but her Free T3 and Free T4 were normal. She is not taking Cytomel and again asks me pros and cons of taking this. I again reviewed guidelines on this regarding thyroid regulation (levothyroxine only) and small evidence for Cytomel for mood. She will continue on current dose of Synthroid brand name and repeat labs after 6 - 8 weeks. She was again advised she can only have one PCP.

## 2023-09-14 DIAGNOSIS — E03.9 ACQUIRED HYPOTHYROIDISM: ICD-10-CM

## 2023-09-14 RX ORDER — LEVOTHYROXINE SODIUM 100 MCG
100 TABLET ORAL DAILY
Qty: 30 TABLET | Refills: 5 | Status: SHIPPED | OUTPATIENT
Start: 2023-09-14

## 2023-09-27 ENCOUNTER — EVALUATION (OUTPATIENT)
Dept: PHYSICAL THERAPY | Facility: REHABILITATION | Age: 78
End: 2023-09-27
Payer: COMMERCIAL

## 2023-09-27 DIAGNOSIS — M54.9 MID BACK PAIN: ICD-10-CM

## 2023-09-27 DIAGNOSIS — M62.838 MUSCLE SPASM: Primary | ICD-10-CM

## 2023-09-27 PROCEDURE — 97161 PT EVAL LOW COMPLEX 20 MIN: CPT | Performed by: PHYSICAL THERAPIST

## 2023-09-27 PROCEDURE — 97110 THERAPEUTIC EXERCISES: CPT | Performed by: PHYSICAL THERAPIST

## 2023-09-27 NOTE — PROGRESS NOTES
PT Evaluation     Today's date: 2023  Patient name: Mariah Calzada  : 1945  MRN: [id number]  Referring provider: Jules Cody DO  Dx:   Encounter Diagnosis     ICD-10-CM    1. Muscle spasm  M62.838 Ambulatory Referral to Physical Therapy      2. Mid back pain  M54.9           Start Time: 7866  Stop Time: 1015  Total time in clinic (min): 37 minutes    Assessment  Assessment details: Mariah Calzada is an 78y.o. year old female who presents to [facility] with muscle spasm in the mid back that is aggravated by chronic costochondritis. She presents with poor posture and significant spinal and rib hypomobility at all levels as well as tension in paraspinal musculature. Tenderness is most recreated at upper to mid thoracic spine. She presents with postural weakness. Mariah presents with the impairments as listed above and would benefit from Physical Therapy to address these impairments, improved quality of life and to maximize function. Impairments: abnormal or restricted ROM, activity intolerance, impaired physical strength, lacks appropriate home exercise program and poor posture     Goals  Short-Term Goals: 2-4 weeks  1. Patient will demonstrate improved awareness of posture with sitting. 2. Patient will demonstrate improvement in postural strength by 1-2 grades. Long-Term Goals: 4-6 weeks  1. Patient will present with improved thoracic mobility. 2. Patient independent with HEP at time of discharge.        Plan  Patient would benefit from: skilled physical therapy and PT eval  Planned therapy interventions: home exercise program, therapeutic exercise, therapeutic activities, strengthening, stretching, postural training, patient education and manual therapy  Frequency: 2x week (decreased to 1x/week secondary to financial limitations)  Duration in visits: 10  Duration in weeks: 8  Plan of Care beginning date: 2023  Plan of Care expiration date: 2023  Treatment plan discussed with: patient        Subjective Evaluation    History of Present Illness  Mechanism of injury: Patient is an 78 y.o. presenting to physical therapy with complaints of a muscle spasm in her right mid back at the bra line that has been ongoing for years and can flare up. She has a history of costochondritis and can get muscle spasms because of that. She reports the right is greater than the left but it can radiate to the left. She feels she has had a limp since bilateral hip replacements secondary to weakness in her left glute. Goes to the gym 3x/week and plays pickleball 2x/week - feels that exercise aggravates the pain  Patient Goals  Patient goals for therapy: decreased pain and increased strength  Patient goal: reduce aching pain in the back  Pain  Current pain ratin  At best pain ratin  At worst pain rating: 10  Location: tenderness at mid ribcage and mid back  Quality: dull ache  Relieving factors: heat and rest  Aggravating factors: sitting (putting a bra on and sitting )    Social Support  Lives with: spouse          Objective     Static Posture     Head  Forward. Shoulders  Rounded. Thoracic Spine  Hyperkyphosis. Postural Observations  Seated posture: poor  Standing posture: poor        Palpation   Left   Hypertonic in the cervical paraspinals. Tenderness of the cervical paraspinals. Right   Hypertonic in the cervical paraspinals. Tenderness of the cervical paraspinals.      Joint Play     Hypomobile: C6, C7, T1, T2, T3, T4, T5, T6, T7, T8, T9, T10, T11, T12, 6th rib, 7th rib and 8th rib     Pain: T5, T6, T7, 6th rib, 7th rib and 8th rib     Strength/Myotome Testing     Left Shoulder     Planes of Motion   Flexion: 4   Abduction: 4   External rotation at 0°: 4   Internal rotation at 0°: 4     Isolated Muscles   Lower trapezius: 2   Middle trapezius: 4   Upper trapezius: 4     Right Shoulder     Planes of Motion   Flexion: 4   Abduction: 4   External rotation at 0°: 4   Internal rotation at 0°: 4     Isolated Muscles   Lower trapezius: 2   Middle trapezius: 4   Upper trapezius: 4     Left Elbow   Flexion: 4+  Extension: 4+    Right Elbow   Flexion: 4+  Extension: 4+             Precautions: b/l KATIE, costochondritis      Manuals 9/27            Thoracic  nv            Rib PA mobs nv            Thoracic ES STM nv                         Neuro Re-Ed                                                                 Ther Ex             UBE reverse - postural strength nv            LTR nv            Seated upper thoracic stretch             Thoracic ext st 1/2 pillow             TB ER w scap retraction Pink 2x10            Chin tucks seated  2x10            TB mid row nv            TB low row nv                         Prone I             Prone T                          Ther Activity                                                                 Modalities

## 2023-10-02 ENCOUNTER — OFFICE VISIT (OUTPATIENT)
Dept: PHYSICAL THERAPY | Facility: REHABILITATION | Age: 78
End: 2023-10-02
Payer: COMMERCIAL

## 2023-10-02 DIAGNOSIS — M54.9 MID BACK PAIN: ICD-10-CM

## 2023-10-02 DIAGNOSIS — M62.838 MUSCLE SPASM: Primary | ICD-10-CM

## 2023-10-02 PROCEDURE — 97110 THERAPEUTIC EXERCISES: CPT | Performed by: PHYSICAL THERAPIST

## 2023-10-02 PROCEDURE — 97140 MANUAL THERAPY 1/> REGIONS: CPT | Performed by: PHYSICAL THERAPIST

## 2023-10-02 NOTE — PROGRESS NOTES
Daily Note     Today's date: 10/2/2023  Patient name: Mariah Meadows  : 1945  MRN: [id number]  Referring provider: Margarita Hathaway DO  Dx:   Encounter Diagnosis     ICD-10-CM    1. Muscle spasm  M62.838       2. Mid back pain  M54.9           Start Time: 933  Stop Time: 1597  Total time in clinic (min): 42 minutes    Subjective: Patient reports soreness from completing the exercises but no complaints of pain at start of session. Objective: See treatment diary below      Assessment: Tolerated treatment well. Initiated plan of care this visit. Fair tolerance to manual therapy with positive response to muscular STM at T6-T8 but increase in pain noted with gentle thoracic mobilizations likely secondary to significant joint hypomobility. Initiated additional postural strengthening with patient reporting feeling "looser" post-treatment. Provided patient with updated HEP. Patient demonstrated fatigue post treatment, exhibited good technique with therapeutic exercises and would benefit from continued PT. Plan: Continue per plan of care.       Precautions: b/l KATIE, costochondritis      Manuals  10           Thoracic  nv Done           Rib PA mobs nv            Thoracic ES STM nv Done                        Neuro Re-Ed                                                                 Ther Ex             UBE reverse - postural strength nv 5' 10W           LTR nv 10x           Seated upper thoracic stretch             Thoracic ext st 1/2 pillow (pillow + towel roll under head)  2' w DB           TB ER w scap retraction* Pink 2x10 Pink 2x10           Chin tucks seated* 2x10 2x10x5"           TB mid row* nv GTB 2x10           TB low row* nv Pink 2x10                        Prone I  nv           Prone T                          Ther Activity                                                                 Modalities

## 2023-10-04 ENCOUNTER — APPOINTMENT (OUTPATIENT)
Dept: LAB | Facility: HOSPITAL | Age: 78
End: 2023-10-04
Payer: COMMERCIAL

## 2023-10-04 DIAGNOSIS — E03.9 ACQUIRED HYPOTHYROIDISM: ICD-10-CM

## 2023-10-04 PROBLEM — E78.00 HYPERCHOLESTEROLEMIA: Status: ACTIVE | Noted: 2023-10-04

## 2023-10-04 PROBLEM — M54.6 CHRONIC BILATERAL THORACIC BACK PAIN: Status: RESOLVED | Noted: 2019-06-10 | Resolved: 2023-10-04

## 2023-10-04 PROBLEM — E66.09 CLASS 1 OBESITY DUE TO EXCESS CALORIES WITH SERIOUS COMORBIDITY AND BODY MASS INDEX (BMI) OF 31.0 TO 31.9 IN ADULT: Status: RESOLVED | Noted: 2020-02-27 | Resolved: 2023-10-04

## 2023-10-04 PROBLEM — I73.9 PAD (PERIPHERAL ARTERY DISEASE) (HCC): Status: RESOLVED | Noted: 2022-02-03 | Resolved: 2023-10-04

## 2023-10-04 PROBLEM — G89.29 CHRONIC BILATERAL THORACIC BACK PAIN: Status: RESOLVED | Noted: 2019-06-10 | Resolved: 2023-10-04

## 2023-10-04 PROBLEM — E66.811 CLASS 1 OBESITY DUE TO EXCESS CALORIES WITH SERIOUS COMORBIDITY AND BODY MASS INDEX (BMI) OF 31.0 TO 31.9 IN ADULT: Status: RESOLVED | Noted: 2020-02-27 | Resolved: 2023-10-04

## 2023-10-04 PROBLEM — N63.20 BREAST MASS, LEFT: Status: RESOLVED | Noted: 2021-08-27 | Resolved: 2023-10-04

## 2023-10-04 LAB
T3FREE SERPL-MCNC: 3.61 PG/ML (ref 2.5–3.9)
T4 FREE SERPL-MCNC: 1.13 NG/DL (ref 0.61–1.12)
TSH SERPL DL<=0.05 MIU/L-ACNC: 0.66 UIU/ML (ref 0.45–4.5)

## 2023-10-04 PROCEDURE — 36415 COLL VENOUS BLD VENIPUNCTURE: CPT

## 2023-10-04 PROCEDURE — 84481 FREE ASSAY (FT-3): CPT

## 2023-10-04 PROCEDURE — 84443 ASSAY THYROID STIM HORMONE: CPT

## 2023-10-04 PROCEDURE — 84439 ASSAY OF FREE THYROXINE: CPT

## 2023-10-04 NOTE — ASSESSMENT & PLAN NOTE
Medical POA daughter Molly Pires, if there is hope do something, if there is no hope DNR.  She states she has talked to her and wants her daughter to make the decision.

## 2023-10-05 ENCOUNTER — OFFICE VISIT (OUTPATIENT)
Dept: FAMILY MEDICINE CLINIC | Facility: CLINIC | Age: 78
End: 2023-10-05
Payer: COMMERCIAL

## 2023-10-05 VITALS
HEIGHT: 67 IN | DIASTOLIC BLOOD PRESSURE: 80 MMHG | BODY MASS INDEX: 32.02 KG/M2 | SYSTOLIC BLOOD PRESSURE: 122 MMHG | HEART RATE: 81 BPM | TEMPERATURE: 97.6 F | OXYGEN SATURATION: 98 % | RESPIRATION RATE: 16 BRPM | WEIGHT: 204 LBS

## 2023-10-05 DIAGNOSIS — M79.7 FIBROMYALGIA: ICD-10-CM

## 2023-10-05 DIAGNOSIS — F33.9 DEPRESSION, RECURRENT (HCC): ICD-10-CM

## 2023-10-05 DIAGNOSIS — Z00.00 ENCOUNTER FOR MEDICARE ANNUAL WELLNESS EXAM: Primary | ICD-10-CM

## 2023-10-05 DIAGNOSIS — G24.5 EYE TWITCH: ICD-10-CM

## 2023-10-05 DIAGNOSIS — Z71.89 COUNSELING REGARDING ADVANCED DIRECTIVES: ICD-10-CM

## 2023-10-05 DIAGNOSIS — E66.09 CLASS 1 OBESITY DUE TO EXCESS CALORIES WITH SERIOUS COMORBIDITY AND BODY MASS INDEX (BMI) OF 31.0 TO 31.9 IN ADULT: ICD-10-CM

## 2023-10-05 DIAGNOSIS — E78.00 HYPERCHOLESTEROLEMIA: ICD-10-CM

## 2023-10-05 DIAGNOSIS — F41.9 ANXIETY: ICD-10-CM

## 2023-10-05 DIAGNOSIS — R73.01 IFG (IMPAIRED FASTING GLUCOSE): ICD-10-CM

## 2023-10-05 DIAGNOSIS — E03.9 ACQUIRED HYPOTHYROIDISM: ICD-10-CM

## 2023-10-05 PROBLEM — J30.1 SEASONAL ALLERGIC RHINITIS DUE TO POLLEN: Status: ACTIVE | Noted: 2023-10-05

## 2023-10-05 PROCEDURE — 99214 OFFICE O/P EST MOD 30 MIN: CPT | Performed by: FAMILY MEDICINE

## 2023-10-05 PROCEDURE — G0439 PPPS, SUBSEQ VISIT: HCPCS | Performed by: FAMILY MEDICINE

## 2023-10-05 NOTE — PROGRESS NOTES
Assessment and Plan:     Problem List Items Addressed This Visit        Unprioritized    IFG (impaired fasting glucose)     Update fasting blood work. Working on trying to eat well and already gets physical activity. Relevant Orders    Comprehensive metabolic panel    Hemoglobin A1C    Vitamin D 25 hydroxy    C-reactive protein    Homocysteine    Hypercholesterolemia     Update labs          Relevant Orders    Lipid panel    Vitamin D 25 hydroxy    C-reactive protein    Homocysteine    Fibromyalgia     In PT and seems to be helpful          Relevant Orders    Vitamin D 25 hydroxy    C-reactive protein    Homocysteine    Depression, recurrent (HCC)     Stable. Using supplement as needed          Counseling regarding advanced directives     Medical POA daughter Georgina Dumont, if there is hope do something, if there is no hope DNR. She states she has talked to her and wants her daughter to make the decision.             Relevant Orders    Vitamin D 25 hydroxy    C-reactive protein    Homocysteine    Class 1 obesity due to excess calories with serious comorbidity and body mass index (BMI) of 31.0 to 31.9 in adult     Encouraged diet and exercise to help for a healthier BMI. Offered referral to weight management. She will consider. Relevant Orders    Ambulatory Referral to Weight Management    Vitamin D 25 hydroxy    C-reactive protein    Homocysteine    Anxiety     Stable. Using supplement as needed          Relevant Orders    Vitamin D 25 hydroxy    C-reactive protein    Homocysteine    Acquired hypothyroidism     Well controlled. No symptoms. Relevant Orders    TSH, 3rd generation with Free T4 reflex    Vitamin D 25 hydroxy    C-reactive protein    Homocysteine   Other Visit Diagnoses     Encounter for Medicare annual wellness exam    -  Primary    Eye twitch        reassured ptAlejandro madrid from stress. she does not feel stressed.             Preventive health issues were discussed with patient, and age appropriate screening tests were ordered as noted in patient's After Visit Summary. Personalized health advice and appropriate referrals for health education or preventive services given if needed, as noted in patient's After Visit Summary.      History of Present Illness:     Patient presents for a Medicare Wellness Visit    HPI   Patient Care Team:  Joel Littlejohn DO as PCP - General (Family Medicine)  Aubree Mehta MD     Review of Systems:     Review of Systems     Problem List:     Patient Active Problem List   Diagnosis   • Arthritis   • Acquired hypothyroidism   • Fibromyalgia   • Costochondritis   • Chronic bilateral thoracic back pain   • IFG (impaired fasting glucose)   • Irritable bowel syndrome with both constipation and diarrhea   • History of basal cell carcinoma (BCC) of skin   • Class 1 obesity due to excess calories with serious comorbidity and body mass index (BMI) of 31.0 to 31.9 in adult   • Fibrocystic breast disease   • No vaccination-pt refuse   • Counseling regarding advanced directives   • Lipoma of back   • Anxiety   • Hypercholesterolemia   • Depression, recurrent ([address])   • Seasonal allergic rhinitis due to pollen      Past Medical and Surgical History:     Past Medical History:   Diagnosis Date   • Allergic    • Arthritis    • Basal cell carcinoma    • Breast lump    • Cancer ([address])    • Disease of thyroid gland    • Kidney stone     Stone     Past Surgical History:   Procedure Laterality Date   • BELPHAROPTOSIS REPAIR     • BREAST BIOPSY Left    • BREAST CYST EXCISION Left    • BREAST SURGERY      Puncture aspiration of a cyst   • COLONOSCOPY     • JOINT REPLACEMENT      both hips   • SKIN LESION EXCISION      Back, Lt Face   • TONSILLECTOMY     • TOTAL HIP ARTHROPLASTY Bilateral     Impression 2/25/16   • WISDOM TOOTH EXTRACTION        Family History:     Family History   Problem Relation Age of Onset   • Diabetes Father    • Hearing loss Father    • Uterine cancer Family    • Stroke Mother    • Hearing loss Mother    • Vision loss Mother    • Thyroid disease Mother    • No Known Problems Brother    • No Known Problems Daughter    • Endometrial cancer Maternal Grandmother    • Heart disease Neg Hx       Social History:     Social History     Socioeconomic History   • Marital status: /Civil Union     Spouse name: None   • Number of children: 1   • Years of education: None   • Highest education level: None   Occupational History   • None   Tobacco Use   • Smoking status: Former     Packs/day: 1.00     Years: 6.00     Total pack years: 6.00     Types: Cigarettes     Start date: 1960     Quit date: 1965     Years since quittin.7   • Smokeless tobacco: Never   • Tobacco comments:     8 years smoking per Melina   Vaping Use   • Vaping Use: Never used   Substance and Sexual Activity   • Alcohol use: Yes     Comment: every now and then   • Drug use: No   • Sexual activity: Yes     Partners: Male     Birth control/protection: Post-menopausal   Other Topics Concern   • None   Social History Narrative    Lives with her  Lonell   > 45 years     3 children live locally - one hers, 2 her husbands (one son estranged for 25 years) - 8 grandchildren, 3 great grandchildren          Never a smoker - As per [employer]    No alcohol use - As per [employer]         Most recent tobacco use screenin2018    Advance directive: Yes 12/3/19    Live alone or with others: with others    Marital status:     Are you currently employed: No    Alcohol intake: None    Chewing tobacco: none    Per [facility]     Social Determinants of Health     Financial Resource Strain: Low Risk  (2023)    Overall Financial Resource Strain (CARDIA)    • Difficulty of Paying Living Expenses: Not very hard   Food Insecurity: Not on file   Transportation Needs: No Transportation Needs (2023)    PRAPARE - Transportation    • Lack of Transportation (Medical):  No    • Lack of Transportation (Non-Medical): No   Physical Activity: Not on file   Stress: Not on file   Social Connections: Not on file   Intimate Partner Violence: Not on file   Housing Stability: Not on file      Medications and Allergies:     Current Outpatient Medications   Medication Sig Dispense Refill   • Accu-Chek Softclix Lancets lancets Use daily Testing once daily 100 each 3   • Cholecalciferol (Vitamin D3) 10 MCG/ML LIQD Take 6,000 Units by mouth daily     • glucose blood test strip Use 1 each daily Testing once daily Accu Check Lashae Plus ALYCE BLOUNT test strips 100 each 3   • Multiple Vitamins-Minerals (MULTIVITAMIN ADULT PO) Take by mouth     • Synthroid 100 MCG tablet Take 1 tablet (100 mcg total) by mouth daily BRAND NECESSARY 30 tablet 5     No current facility-administered medications for this visit. Allergies   Allergen Reactions   • Sulfa Antibiotics Shortness Of Breath      Immunizations: There is no immunization history on file for this patient. Health Maintenance:         Topic Date Due   • DXA SCAN  02/12/2025   • Hepatitis C Screening  Completed         Topic Date Due   • COVID-19 Vaccine (1) Never done   • Pneumococcal Vaccine: 65+ Years (1 - PCV) Never done      Medicare Screening Tests and Risk Assessments:     Mariah is here for her Subsequent Wellness visit. Health Risk Assessment:   Patient rates overall health as very good. Patient feels that their physical health rating is same. Patient is satisfied with their life. Eyesight was rated as same. Hearing was rated as same. Patient feels that their emotional and mental health rating is much better. Patients states they are never, rarely angry. Patient states they are sometimes unusually tired/fatigued. Pain experienced in the last 7 days has been some. Patient's pain rating has been 7/10. Patient states that she has experienced weight loss or gain in last 6 months.      Depression Screening:   PHQ-2 Score: 1  PHQ-9 Score: 6      Fall Risk Screening: In the past year, patient has experienced: no history of falling in past year      Urinary Incontinence Screening:   Patient has not leaked urine accidently in the last six months. Home Safety:  Patient does not have trouble with stairs inside or outside of their home. Patient has working smoke alarms and has working carbon monoxide detector. Home safety hazards include: none. Nutrition:   Current diet is Limited junk food. Medications:   Patient is currently taking over-the-counter supplements. OTC medications include: vitamins. Patient is able to manage medications. Activities of Daily Living (ADLs)/Instrumental Activities of Daily Living (IADLs):   Walk and transfer into and out of bed and chair?: Yes  Dress and groom yourself?: Yes    Bathe or shower yourself?: Yes    Feed yourself? Yes  Do your laundry/housekeeping?: Yes  Manage your money, pay your bills and track your expenses?: Yes  Make your own meals?: Yes    Do your own shopping?: Yes    Previous Hospitalizations:   Any hospitalizations or ED visits within the last 12 months?: No      Advance Care Planning:   Living will: Yes    Durable POA for healthcare:  Yes    Advanced directive: Yes    Advanced directive counseling given: Yes    Five wishes given: Yes    End of Life Decisions reviewed with patient: Yes      Comments: See problem list     Cognitive Screening:   Provider or family/friend/caregiver concerned regarding cognition?: No    PREVENTIVE SCREENINGS      Cardiovascular Screening:    General: Screening Not Indicated and History Lipid Disorder      Cervical Cancer Screening:    General: Screening Not Indicated      Osteoporosis Screening:    General: Screening Current      Lung Cancer Screening:     General: Screening Not Indicated      Hepatitis C Screening:    General: Screening Current    Screening, Brief Intervention, and Referral to Treatment (SBIRT)    Screening  Typical number of drinks in a day: 0  Typical number of drinks in a week: 0  Interpretation: Low risk drinking behavior. AUDIT-C Screenin) How often did you have a drink containing alcohol in the past year? monthly or less  2) How many drinks did you have on a typical day when you were drinking in the past year? 0  3) How often did you have 6 or more drinks on one occasion in the past year? never    AUDIT-C Score: 1  Interpretation: Score 0-2 (female): Negative screen for alcohol misuse    Single Item Drug Screening:  How often have you used an illegal drug (including marijuana) or a prescription medication for non-medical reasons in the past year? never    Single Item Drug Screen Score: 0  Interpretation: Negative screen for possible drug use disorder    Other Counseling Topics:   Declines vaccines. No results found.      Physical Exam:     /80   Pulse 81   Temp 97.6 °F (36.4 °C)   Resp 16   Ht 5' 7" (1.702 m)   Wt 92.5 kg (204 lb)   SpO2 98%   BMI 31.95 kg/m²     Physical Exam     Kirsten Taylor, DO

## 2023-10-05 NOTE — PATIENT INSTRUCTIONS
Medicare Preventive Visit Patient Instructions  Thank you for completing your Welcome to Medicare Visit or Medicare Annual Wellness Visit today. Your next wellness visit will be due in one year (10/5/2024). The screening/preventive services that you may require over the next 5-10 years are detailed below. Some tests may not apply to you based off risk factors and/or age. Screening tests ordered at today's visit but not completed yet may show as past due. Also, please note that scanned in results may not display below. Preventive Screenings:  Service Recommendations Previous Testing/Comments   Colorectal Cancer Screening  * Colonoscopy    * Fecal Occult Blood Test (FOBT)/Fecal Immunochemical Test (FIT)  * Fecal DNA/Cologuard Test  * Flexible Sigmoidoscopy Age: 43-73 years old   Colonoscopy: every 10 years (may be performed more frequently if at higher risk)  OR  FOBT/FIT: every 1 year  OR  Cologuard: every 3 years  OR  Sigmoidoscopy: every 5 years  Screening may be recommended earlier than age 39 if at higher risk for colorectal cancer. Also, an individualized decision between you and your healthcare provider will decide whether screening between the ages of 77-80 would be appropriate. Colonoscopy: Not on file  FOBT/FIT: Not on file  Cologuard: Not on file  Sigmoidoscopy: Not on file          Breast Cancer Screening Age: 36 years old  Frequency: every 1-2 years  Not required if history of left and right mastectomy Mammogram: Not on file        Cervical Cancer Screening Between the ages of 21-29, pap smear recommended once every 3 years. Between the ages of 32-69, can perform pap smear with HPV co-testing every 5 years.    Recommendations may differ for women with a history of total hysterectomy, cervical cancer, or abnormal pap smears in past. Pap Smear: 03/01/2018    Screening Not Indicated   Hepatitis C Screening Once for adults born between 1945 and 1965  More frequently in patients at high risk for Hepatitis C Hep C Antibody: 01/27/2022    Screening Current   Diabetes Screening 1-2 times per year if you're at risk for diabetes or have pre-diabetes Fasting glucose: 102 mg/dL (7/8/2021)  A1C: 5.6 (8/1/2022)      Cholesterol Screening Once every 5 years if you don't have a lipid disorder. May order more often based on risk factors. Lipid panel: 12/16/2020    Screening Not Indicated  History Lipid Disorder     Other Preventive Screenings Covered by Medicare:  1. Abdominal Aortic Aneurysm (AAA) Screening: covered once if your at risk. You're considered to be at risk if you have a family history of AAA. 2. Lung Cancer Screening: covers low dose CT scan once per year if you meet all of the following conditions: (1) Age 48-67; (2) No signs or symptoms of lung cancer; (3) Current smoker or have quit smoking within the last 15 years; (4) You have a tobacco smoking history of at least 20 pack years (packs per day multiplied by number of years you smoked); (5) You get a written order from a healthcare provider. 3. Glaucoma Screening: covered annually if you're considered high risk: (1) You have diabetes OR (2) Family history of glaucoma OR (3)  aged 48 and older OR (3)  American aged 72 and older  3. Osteoporosis Screening: covered every 2 years if you meet one of the following conditions: (1) You're estrogen deficient and at risk for osteoporosis based off medical history and other findings; (2) Have a vertebral abnormality; (3) On glucocorticoid therapy for more than 3 months; (4) Have primary hyperparathyroidism; (5) On osteoporosis medications and need to assess response to drug therapy. · Last bone density test (DXA Scan): 02/12/2020.  5. HIV Screening: covered annually if you're between the age of 15-65. Also covered annually if you are younger than 13 and older than 72 with risk factors for HIV infection.  For pregnant patients, it is covered up to 3 times per pregnancy. Immunizations:  Immunization Recommendations   Influenza Vaccine Annual influenza vaccination during flu season is recommended for all persons aged >= 6 months who do not have contraindications   Pneumococcal Vaccine   * Pneumococcal conjugate vaccine = PCV13 (Prevnar 13), PCV15 (Vaxneuvance), PCV20 (Prevnar 20)  * Pneumococcal polysaccharide vaccine = PPSV23 (Pneumovax) Adults 20-63 years old: 1-3 doses may be recommended based on certain risk factors  Adults 72 years old: 1-2 doses may be recommended based off what pneumonia vaccine you previously received   Hepatitis B Vaccine 3 dose series if at intermediate or high risk (ex: diabetes, end stage renal disease, liver disease)   Tetanus (Td) Vaccine - COST NOT COVERED BY MEDICARE PART B Following completion of primary series, a booster dose should be given every 10 years to maintain immunity against tetanus. Td may also be given as tetanus wound prophylaxis. Tdap Vaccine - COST NOT COVERED BY MEDICARE PART B Recommended at least once for all adults. For pregnant patients, recommended with each pregnancy. Shingles Vaccine (Shingrix) - COST NOT COVERED BY MEDICARE PART B  2 shot series recommended in those aged 48 and above     Health Maintenance Due:      Topic Date Due   • DXA SCAN  02/12/2025   • Hepatitis C Screening  Completed     Immunizations Due:      Topic Date Due   • COVID-19 Vaccine (1) Never done   • Pneumococcal Vaccine: 65+ Years (1 - PCV) Never done     Advance Directives   What are advance directives? Advance directives are legal documents that state your wishes and plans for medical care. These plans are made ahead of time in case you lose your ability to make decisions for yourself. Advance directives can apply to any medical decision, such as the treatments you want, and if you want to donate organs. What are the types of advance directives?   There are many types of advance directives, and each state has rules about how to use them. You may choose a combination of any of the following:  · Living will: This is a written record of the treatment you want. You can also choose which treatments you do not want, which to limit, and which to stop at a certain time. This includes surgery, medicine, IV fluid, and tube feedings. · Durable power of  for healthcare [facility]): This is a written record that states who you want to make healthcare choices for you when you are unable to make them for yourself. This person, called a proxy, is usually a family member or a friend. You may choose more than 1 proxy. · Do not resuscitate (DNR) order:  A DNR order is used in case your heart stops beating or you stop breathing. It is a request not to have certain forms of treatment, such as CPR. A DNR order may be included in other types of advance directives. · Medical directive: This covers the care that you want if you are in a coma, near death, or unable to make decisions for yourself. You can list the treatments you want for each condition. Treatment may include pain medicine, surgery, blood transfusions, dialysis, IV or tube feedings, and a ventilator (breathing machine). · Values history: This document has questions about your views, beliefs, and how you feel and think about life. This information can help others choose the care that you would choose. Why are advance directives important? An advance directive helps you control your care. Although spoken wishes may be used, it is better to have your wishes written down. Spoken wishes can be misunderstood, or not followed. Treatments may be given even if you do not want them. An advance directive may make it easier for your family to make difficult choices about your care. Weight Management   Why it is important to manage your weight:  Being overweight increases your risk of health conditions such as heart disease, high blood pressure, type 2 diabetes, and certain types of cancer.  It can also increase your risk for osteoarthritis, sleep apnea, and other respiratory problems. Aim for a slow, steady weight loss. Even a small amount of weight loss can lower your risk of health problems. How to lose weight safely:  A safe and healthy way to lose weight is to eat fewer calories and get regular exercise. You can lose up about 1 pound a week by decreasing the number of calories you eat by 500 calories each day. Healthy meal plan for weight management:  A healthy meal plan includes a variety of foods, contains fewer calories, and helps you stay healthy. A healthy meal plan includes the following:  · Eat whole-grain foods more often. A healthy meal plan should contain fiber. Fiber is the part of grains, fruits, and vegetables that is not broken down by your body. Whole-grain foods are healthy and provide extra fiber in your diet. Some examples of whole-grain foods are whole-wheat breads and pastas, oatmeal, brown rice, and bulgur. · Eat a variety of vegetables every day. Include dark, leafy greens such as spinach, kale, ganga greens, and mustard greens. Eat yellow and orange vegetables such as carrots, sweet potatoes, and winter squash. · Eat a variety of fruits every day. Choose fresh or canned fruit (canned in its own juice or light syrup) instead of juice. Fruit juice has very little or no fiber. · Eat low-fat dairy foods. Drink fat-free (skim) milk or 1% milk. Eat fat-free yogurt and low-fat cottage cheese. Try low-fat cheeses such as mozzarella and other reduced-fat cheeses. · Choose meat and other protein foods that are low in fat. Choose beans or other legumes such as split peas or lentils. Choose fish, skinless poultry (chicken or turkey), or lean cuts of red meat (beef or pork). Before you cook meat or poultry, cut off any visible fat. · Use less fat and oil. Try baking foods instead of frying them.  Add less fat, such as margarine, sour cream, regular salad dressing and mayonnaise to foods. Eat fewer high-fat foods. Some examples of high-fat foods include french fries, doughnuts, ice cream, and cakes. · Eat fewer sweets. Limit foods and drinks that are high in sugar. This includes candy, cookies, regular soda, and sweetened drinks. Exercise:  Exercise at least 30 minutes per day on most days of the week. Some examples of exercise include walking, biking, dancing, and swimming. You can also fit in more physical activity by taking the stairs instead of the elevator or parking farther away from stores. Ask your healthcare provider about the best exercise plan for you. © Copyright [employer] 2018 Information is for End User's use only and may not be sold, redistributed or otherwise used for commercial purposes.  All illustrations and images included in CareNotes® are the copyrighted property of [employer] or [address] [place] [address]

## 2023-10-05 NOTE — PROGRESS NOTES
1. Encounter for Medicare annual wellness exam    2. Acquired hypothyroidism  Assessment & Plan:  Well controlled. No symptoms. Orders:  -     TSH, 3rd generation with Free T4 reflex; Future; Expected date: 01/27/2024  -     Vitamin D 25 hydroxy; Future; Expected date: 01/27/2024  -     C-reactive protein; Future; Expected date: 01/27/2024  -     Homocysteine; Future; Expected date: 01/27/2024    3. Class 1 obesity due to excess calories with serious comorbidity and body mass index (BMI) of 31.0 to 31.9 in adult  Assessment & Plan:  Encouraged diet and exercise to help for a healthier BMI. Offered referral to weight management. She will consider. Orders:  -     Ambulatory Referral to Weight Management; Future; Expected date: 01/27/2024  -     Vitamin D 25 hydroxy; Future; Expected date: 01/27/2024  -     C-reactive protein; Future; Expected date: 01/27/2024  -     Homocysteine; Future; Expected date: 01/27/2024    4. Anxiety  Assessment & Plan:  Stable. Using supplement as needed     Orders:  -     Vitamin D 25 hydroxy; Future; Expected date: 01/27/2024  -     C-reactive protein; Future; Expected date: 01/27/2024  -     Homocysteine; Future; Expected date: 01/27/2024    5. Fibromyalgia  Assessment & Plan:  In PT and seems to be helpful     Orders:  -     Vitamin D 25 hydroxy; Future; Expected date: 01/27/2024  -     C-reactive protein; Future; Expected date: 01/27/2024  -     Homocysteine; Future; Expected date: 01/27/2024    6. IFG (impaired fasting glucose)  Assessment & Plan:  Update fasting blood work. Working on trying to eat well and already gets physical activity. Orders:  -     Comprehensive metabolic panel; Future; Expected date: 01/27/2024  -     Hemoglobin A1C; Future; Expected date: 01/27/2024  -     Vitamin D 25 hydroxy; Future; Expected date: 01/27/2024  -     C-reactive protein; Future; Expected date: 01/27/2024  -     Homocysteine; Future; Expected date: 01/27/2024    7. Hypercholesterolemia  Assessment & Plan:  Update labs     Orders:  -     Lipid panel; Future; Expected date: 01/27/2024  -     Vitamin D 25 hydroxy; Future; Expected date: 01/27/2024  -     C-reactive protein; Future; Expected date: 01/27/2024  -     Homocysteine; Future; Expected date: 01/27/2024    8. Counseling regarding advanced directives  Assessment & Plan:  Medical POA daughter Price Ruby, if there is hope do something, if there is no hope DNR. She states she has talked to her and wants her daughter to make the decision.        Orders:  -     Vitamin D 25 hydroxy; Future; Expected date: 01/27/2024  -     C-reactive protein; Future; Expected date: 01/27/2024  -     Homocysteine; Future; Expected date: 01/27/2024    9. Eye twitch  Comments:  reassured ptAlejandro [place] from stress. she does not feel stressed. 10. Depression, recurrent ([address])  Assessment & Plan:  Stable. Using supplement as needed            Return in about 4 months (around 2/5/2024) for labs, CC .     Subjective:   Mariah is a 78 y.o. female here today for a follow-up on her current medical conditions:    Patient Active Problem List   Diagnosis   • Arthritis   • Acquired hypothyroidism   • Fibromyalgia   • Costochondritis   • Chronic bilateral thoracic back pain   • IFG (impaired fasting glucose)   • Irritable bowel syndrome with both constipation and diarrhea   • History of basal cell carcinoma (BCC) of skin   • Class 1 obesity due to excess calories with serious comorbidity and body mass index (BMI) of 31.0 to 31.9 in adult   • Fibrocystic breast disease   • No vaccination-pt refuse   • Counseling regarding advanced directives   • Lipoma of back   • Anxiety   • Hypercholesterolemia   • Depression, recurrent ([address])   • Seasonal allergic rhinitis due to pollen       Patient Care Team:  Disha Cuellar DO as PCP - General (Family Medicine)  Morales Olea MD    Current Medications:  Current Outpatient Medications   Medication Sig Dispense Refill   • Accu-Chek Softclix Lancets lancets Use daily Testing once daily 100 each 3   • Cholecalciferol (Vitamin D3) 10 MCG/ML LIQD Take 6,000 Units by mouth daily     • glucose blood test strip Use 1 each daily Testing once daily Accu Check Lashae Plus ALYCE BLOUNT test strips 100 each 3   • Multiple Vitamins-Minerals (MULTIVITAMIN ADULT PO) Take by mouth     • Synthroid 100 MCG tablet Take 1 tablet (100 mcg total) by mouth daily BRAND NECESSARY 30 tablet 5     No current facility-administered medications for this visit. HPI:  Chief Complaint   Patient presents with   • Medicare Wellness Visit   • Sinusitis     Face pain, + Eye twitching, been going on a few weeks now       -- Above per clinical staff and reviewed. --    PHQ-2/9 Depression Screening    Little interest or pleasure in doing things: 1 - several days  Feeling down, depressed, or hopeless: 0 - not at all  Trouble falling or staying asleep, or sleeping too much: 1 - several days  Feeling tired or having little energy: 2 - more than half the days  Poor appetite or overeatin - not at all  Feeling bad about yourself - or that you are a failure or have let yourself or your family down: 2 - more than half the days  Trouble concentrating on things, such as reading the newspaper or watching television: 0 - not at all  Moving or speaking so slowly that other people could have noticed. Or the opposite - being so fidgety or restless that you have been moving around a lot more than usual: 0 - not at all  Thoughts that you would be better off dead, or of hurting yourself in some way: 0 - not at all  PHQ-2 Score: 1  PHQ-2 Interpretation: Negative depression screen  PHQ-9 Score: 6   PHQ-9 Interpretation: Mild depression        SHERYL-7 Flowsheet Screening    Flowsheet Row Most Recent Value   Over the last 2 weeks, how often have you been bothered by any of the following problems?     Feeling nervous, anxious, or on edge 1   Not being able to stop or control worrying 0 Worrying too much about different things 1   Trouble relaxing 0   Being so restless that it is hard to sit still 1   Becoming easily annoyed or irritable 1   Feeling afraid as if something awful might happen 0   SHERYL-7 Total Score 4           due for lipids, cmp, TSH, HbA1C   for next visit CMP A1C TSH lipids CRP homeocysteine requested vit D     Today:  Twitches in right eye lid   Pressure in sinues and behind eyes  pnd   Not coughing anything up and not coughing anything up   flonsae few days   Oregano spray       The following portions of the patient's history were reviewed and updated as appropriate: allergies, current medications, past family history, past medical history, past social history, past surgical history and problem list.    Objective:  Vitals:  /80   Pulse 81   Temp 97.6 °F (36.4 °C)   Resp 16   Ht 5' 7" (1.702 m)   Wt 92.5 kg (204 lb)   SpO2 98%   BMI 31.95 kg/m²    Wt Readings from Last 3 Encounters:   10/05/23 92.5 kg (204 lb)   09/08/23 91.8 kg (202 lb 6 oz)   08/25/22 88 kg (194 lb)      BP Readings from Last 3 Encounters:   10/05/23 122/80   09/08/23 128/80   08/25/22 126/82        Review of Systems   She has no other concerns. No unexpected weight changes. No chest pain, SOB, or palpitations. No GERD. No changes in bowels or bladder. Sleeping okay . No mood changes. Physical Exam   Constitutional:  she appears well-developed and well-nourished. HENT: Head: Normocephalic. Neck: Neck supple. Cardiovascular: Normal rate, regular rhythm and normal heart sounds. Pulmonary/Chest: Effort normal and breath sounds normal. No wheezes, rales, or rhonchi. Abdominal: Soft. Bowel sounds are normal. There is no tenderness. No hepatosplenomegaly. Musculoskeletal: she exhibits no edema. Lymphadenopathy: she has no cervical adenopathy. Neurological: she is alert and oriented to person, place, and time. Skin: Skin is warm and dry.    Psychiatric: she has a normal mood and affect. her behavior is normal. Thought content normal.     BMI Counseling: Body mass index is 31.95 kg/m². The BMI is above normal. Nutrition recommendations include decreasing portion sizes. Exercise recommendations include exercising 3-5 times per week. Rationale for BMI follow-up plan is due to patient being overweight or obese. Depression Screening and Follow-up Plan: Patient was screened for depression during today's encounter. They screened negative with a PHQ-2 score of 1.

## 2023-10-06 ENCOUNTER — OFFICE VISIT (OUTPATIENT)
Dept: PHYSICAL THERAPY | Facility: REHABILITATION | Age: 78
End: 2023-10-06
Payer: COMMERCIAL

## 2023-10-06 DIAGNOSIS — M54.9 MID BACK PAIN: ICD-10-CM

## 2023-10-06 DIAGNOSIS — M62.838 MUSCLE SPASM: Primary | ICD-10-CM

## 2023-10-06 PROCEDURE — 97140 MANUAL THERAPY 1/> REGIONS: CPT

## 2023-10-06 PROCEDURE — 97110 THERAPEUTIC EXERCISES: CPT

## 2023-10-06 NOTE — ASSESSMENT & PLAN NOTE
Encouraged diet and exercise to help for a healthier BMI. Offered referral to weight management. She will consider.

## 2023-10-06 NOTE — PROGRESS NOTES
Daily Note     Today's date: 10/6/2023  Patient name: Mariah Khan  : 1945  MRN: [id number]  Referring provider: Travis Ventura DO  Dx:   Encounter Diagnosis     ICD-10-CM    1. Muscle spasm  M62.838       2. Mid back pain  M54.9           Start Time: 1030  Stop Time: 1115  Total time in clinic (min): 45 minutes    Subjective: Patient reports soreness at start of session. She reports no complaints after previous session and compliance with HEP. Objective: See treatment diary below      Assessment: Tolerated treatment well. Patient demonstrated fatigue post treatment, exhibited good technique with therapeutic exercises and would benefit from continued PT Trialed prone I today with good tolerance, no pain noted. Reports relief with manuals today. Muscle fatigue noted throughout session. Plan: Continue per plan of care. Progress treatment as tolerated.        Precautions: b/l KATIE, costochondritis      Manuals 9/27 10/2 10/6          Thoracic  nv Done           Rib PA mobs nv            Thoracic ES STM nv Done Done                        Neuro Re-Ed                                                                 Ther Ex             UBE reverse - postural strength nv 5' 10W 5' 10 W          LTR nv 10x 10x          Seated upper thoracic stretch             Thoracic ext st 1/2 pillow (pillow + towel roll under head)  2' w DB nv          TB ER w scap retraction* Pink 2x10 Pink 2x10 Pink 2x10          Chin tucks seated* 2x10 2x10x5" 2x10x5''          TB mid row* nv GTB 2x10 GTB 2x10          TB low row* nv Pink 2x10 Pink 2x10                       Prone I  nv 2x10           Prone T                          Ther Activity                                                                 Modalities

## 2023-10-09 ENCOUNTER — OFFICE VISIT (OUTPATIENT)
Dept: PHYSICAL THERAPY | Facility: REHABILITATION | Age: 78
End: 2023-10-09
Payer: COMMERCIAL

## 2023-10-09 DIAGNOSIS — M62.838 MUSCLE SPASM: Primary | ICD-10-CM

## 2023-10-09 DIAGNOSIS — M54.9 MID BACK PAIN: ICD-10-CM

## 2023-10-09 PROCEDURE — 97140 MANUAL THERAPY 1/> REGIONS: CPT | Performed by: PHYSICAL THERAPIST

## 2023-10-09 PROCEDURE — 97110 THERAPEUTIC EXERCISES: CPT | Performed by: PHYSICAL THERAPIST

## 2023-10-09 NOTE — PROGRESS NOTES
Daily Note     Today's date: 10/9/2023  Patient name: Mariah Diez  : 1945  MRN: [id number]  Referring provider: Art Murrell DO  Dx:   Encounter Diagnosis     ICD-10-CM    1. Muscle spasm  M62.838       2. Mid back pain  M54.9           Start Time:   Stop Time:   Total time in clinic (min): 42 minutes    Subjective: Patient reports feeling some muscle soreness in the upper back. Objective: See treatment diary below       Assessment: Tolerated treatment well. Positive response to manual therapy this visit with patient reporting the back feeling "looser" and decreased pain. Able to progress in terms of increased repetitions with good tolerance. Patient reports improved postural awareness post-treatment. Patient demonstrated fatigue post treatment, exhibited good technique with therapeutic exercises and would benefit from continued PT. Plan: Continue per plan of care.       Precautions: b/l KATIE, costochondritis      Manuals 9/27 10/2 10/6 10/9         Thoracic  nv Done  Done T6-T9         Rib PA mobs nv            Thoracic ES STM nv Done Done  Done                      Neuro Re-Ed                                                                 Ther Ex             UBE reverse - postural strength nv 5' 10W 5' 10 W 5' 10W         LTR nv 10x 10x 10x         Seated upper thoracic stretch             Thoracic ext st 1/2 pillow (pillow + towel roll under head)  2' w DB nv 2' with DB         TB ER w scap retraction* Pink 2x10 Pink 2x10 Pink 2x10 Pink 3x10          Chin tucks seated* 2x10 2x10x5" 2x10x5''          TB mid row* nv GTB 2x10 GTB 2x10 GTB 3x10         TB low row* nv Pink 2x10 Pink 2x10 Pink 3x10         TB horizontal abd    OTB 2x10         Prone I  nv 2x10  2x10         Prone T                          Ther Activity                                                                 Modalities

## 2023-10-12 ENCOUNTER — OFFICE VISIT (OUTPATIENT)
Dept: PHYSICAL THERAPY | Facility: REHABILITATION | Age: 78
End: 2023-10-12
Payer: COMMERCIAL

## 2023-10-12 DIAGNOSIS — M54.9 MID BACK PAIN: ICD-10-CM

## 2023-10-12 DIAGNOSIS — M62.838 MUSCLE SPASM: Primary | ICD-10-CM

## 2023-10-12 PROCEDURE — 97140 MANUAL THERAPY 1/> REGIONS: CPT | Performed by: PHYSICAL THERAPIST

## 2023-10-12 PROCEDURE — 97110 THERAPEUTIC EXERCISES: CPT | Performed by: PHYSICAL THERAPIST

## 2023-10-12 NOTE — PROGRESS NOTES
Daily Note     Today's date: 10/12/2023  Patient name: Mariah Reyes  : 1945  MRN: [id number]  Referring provider: Marie Dangelo DO  Dx:   Encounter Diagnosis     ICD-10-CM    1. Muscle spasm  M62.838       2. Mid back pain  M54.9           Start Time:   Stop Time: 0000  Total time in clinic (min): 44 minutes    Subjective: Patient reports soreness at start of session noting that she did a lot yesterday such as setting up a new mattress and vacuum. She felt ok after leaving last session. Objective: See treatment diary below      Assessment: Tolerated treatment well. Positive response to manual therapy this visit with patient reporting the back feels "looser." Progressed in terms of increased repetitions with good tolerance but evident muscle fatigue noted. Patient demonstrated fatigue post treatment, exhibited good technique with therapeutic exercises, and would benefit from continued PT. Plan: Continue per plan of care.       Precautions: b/l KATIE, costochondritis      Manuals 9/27 10/2 10/6 10/9 10/12        Thoracic  nv Done  Done T6-T9 Done        Rib PA mobs nv            Thoracic ES STM nv Done Done  Done Done                     Neuro Re-Ed                                                                 Ther Ex             UBE reverse - postural strength nv 5' 10W 5' 10 W 5' 10W 5' 10W        LTR nv 10x 10x 10x 10x        Seated upper thoracic stretch             Thoracic ext st 1/2 pillow (pillow + towel roll under head)  2' w DB nv 2' with DB 2' w DB        TB ER w scap retraction* Pink 2x10 Pink 2x10 Pink 2x10 Pink 3x10  Pink 3x10        Chin tucks seated* 2x10 2x10x5" 2x10x5''          TB mid row* nv GTB 2x10 GTB 2x10 GTB 3x10 GTB 3x10        TB low row* nv Pink 2x10 Pink 2x10 Pink 3x10 Pink 3x10        TB horizontal abd    OTB 2x10 OTB 3x10        Prone I  nv 2x10  2x10 3x8        Prone T             Scap star     OTB 5x b/l        Ther Activity Modalities

## 2023-10-18 ENCOUNTER — OFFICE VISIT (OUTPATIENT)
Dept: PHYSICAL THERAPY | Facility: REHABILITATION | Age: 78
End: 2023-10-18
Payer: COMMERCIAL

## 2023-10-18 DIAGNOSIS — M62.838 MUSCLE SPASM: Primary | ICD-10-CM

## 2023-10-18 DIAGNOSIS — M54.9 MID BACK PAIN: ICD-10-CM

## 2023-10-18 PROCEDURE — 97110 THERAPEUTIC EXERCISES: CPT | Performed by: PHYSICAL THERAPIST

## 2023-10-18 PROCEDURE — 97140 MANUAL THERAPY 1/> REGIONS: CPT | Performed by: PHYSICAL THERAPIST

## 2023-10-18 NOTE — PROGRESS NOTES
Daily Note     Today's date: 10/18/2023  Patient name: Mariah De La Paz  : 1945  MRN: [id number]  Referring provider: Vania Jackson DO  Dx:   Encounter Diagnosis     ICD-10-CM    1. Muscle spasm  M62.838       2. Mid back pain  M54.9           Start Time: 1020  Stop Time: 1110  Total time in clinic (min): 50 minutes    Subjective: Patient reports increased discomfort in the back and neck reporting it took her 30 minutes to change the sheets on her mattress with a lot of lifting/pushing. She has recently changed her mattress and pillows. She reports pain starting in the front of the ribcage radiating toward the back has improved. Objective: See treatment diary below      Assessment: Tolerated treatment well. Progressed in terms of increased resistance and repetitions with evident muscle fatigue noted. Provided patient with updated HEP. Patient demonstrated fatigue post treatment, exhibited good technique with therapeutic exercises, and would benefit from continued PT. Plan: Continue per plan of care.       Precautions: b/l KATIE, costochondritis      Manuals 9/27 10/2 10/6 10/9 10/12 10/18       Thoracic  nv Done  Done T6-T9 Done Done       Rib PA mobs nv            Thoracic ES STM nv Done Done  Done Done Done + periscapular                    Neuro Re-Ed                                                                 Ther Ex             UBE reverse - postural strength nv 5' 10W 5' 10 W 5' 10W 5' 10W 5' 10W       LTR nv 10x 10x 10x 10x 10x       B/l UT stretch      3x20"b/l       Seated upper thoracic stretch             Thoracic ext st 1/2 pillow (pillow + towel roll under head)  2' w DB nv 2' with DB 2' w DB        TB ER w scap retraction* Pink 2x10 Pink 2x10 Pink 2x10 Pink 3x10  Pink 3x10 GTB 3x12       Chin tucks seated* 2x10 2x10x5" 2x10x5''          TB mid row* nv GTB 2x10 GTB 2x10 GTB 3x10 GTB 3x10 GTB 3x12       TB low row* nv Pink 2x10 Pink 2x10 Pink 3x10 Pink 3x10 Pink 3x12       TB horizontal abd    OTB 2x10 OTB 3x10 OTB 3x12       Prone I  nv 2x10  2x10 3x8 3x10       Prone T             Scap star     OTB 5x b/l OTB 5x b/l       Ther Activity                                                                 Modalities

## 2023-10-25 ENCOUNTER — OFFICE VISIT (OUTPATIENT)
Dept: PHYSICAL THERAPY | Facility: REHABILITATION | Age: 78
End: 2023-10-25
Payer: COMMERCIAL

## 2023-10-25 DIAGNOSIS — M54.9 MID BACK PAIN: ICD-10-CM

## 2023-10-25 DIAGNOSIS — M62.838 MUSCLE SPASM: Primary | ICD-10-CM

## 2023-10-25 PROCEDURE — 97110 THERAPEUTIC EXERCISES: CPT | Performed by: PHYSICAL THERAPIST

## 2023-10-25 PROCEDURE — 97140 MANUAL THERAPY 1/> REGIONS: CPT | Performed by: PHYSICAL THERAPIST

## 2023-10-25 NOTE — PROGRESS NOTES
Daily Note     Today's date: 10/25/2023  Patient name: Mariah Starks Degree  : 1945  MRN: [id number]  Referring provider: Shawn Owens DO  Dx:   Encounter Diagnosis     ICD-10-CM    1. Muscle spasm  M62.838       2. Mid back pain  M54.9           Start Time: 1543  Stop Time: 2522  Total time in clinic (min): 35 minutes    Subjective: Patient reports increased soreness in her mid back and ribs over the last two days. Objective: See treatment diary below      Assessment: Tolerated treatment well. Focused on manual therapy this visit with soft tissue mobilization to lumbar and thoracic erector spinae. Greater tension noted on R compared to L. Patient reports relief post-manual treatment and notes the back feels "looser" post-treatment. Regressed resistance and repetitions for TE secondary to increased soreness. Patient exhibited good technique with therapeutic exercises and would benefit from continued PT. Plan: Continue per plan of care.       Precautions: b/l KATIE, costochondritis      Manuals 9/27 10/2 10/6 10/9 10/12 10/18 10/25      Thoracic  nv Done  Done T6-T9 Done Done       Rib PA mobs nv            Thoracic ES STM nv Done Done  Done Done Done + periscapular Done 15'      UT STM       nv      Neuro Re-Ed                                                                 Ther Ex             UBE reverse - postural strength nv 5' 10W 5' 10 W 5' 10W 5' 10W 5' 10W 5' 15W      LTR nv 10x 10x 10x 10x 10x 10x      B/l UT stretch      3x20"b/l 3x20" b/l      Seated upper thoracic stretch             Thoracic ext st 1/2 pillow (pillow + towel roll under head)  2' w DB nv 2' with DB 2' w DB        TB ER w scap retraction* Pink 2x10 Pink 2x10 Pink 2x10 Pink 3x10  Pink 3x10 GTB 3x12       Chin tucks seated* 2x10 2x10x5" 2x10x5''          TB mid row* nv GTB 2x10 GTB 2x10 GTB 3x10 GTB 3x10 GTB 3x12 Pink 2x10      TB low row* nv Pink 2x10 Pink 2x10 Pink 3x10 Pink 3x10 Pink 3x12 Pink 2x10      TB horizontal abd OTB 2x10 OTB 3x10 OTB 3x12       Prone I  nv 2x10  2x10 3x8 3x10 nv      Prone T             Scap star     OTB 5x b/l OTB 5x b/l       Ther Activity                                                                 Modalities

## 2024-01-04 ENCOUNTER — OFFICE VISIT (OUTPATIENT)
Dept: SURGERY | Facility: CLINIC | Age: 79
End: 2024-01-04
Payer: COMMERCIAL

## 2024-01-04 VITALS
SYSTOLIC BLOOD PRESSURE: 136 MMHG | HEART RATE: 76 BPM | HEIGHT: 67 IN | TEMPERATURE: 98.1 F | BODY MASS INDEX: 31.95 KG/M2 | DIASTOLIC BLOOD PRESSURE: 82 MMHG | OXYGEN SATURATION: 98 %

## 2024-01-04 DIAGNOSIS — D17.1 LIPOMA OF BACK: Primary | ICD-10-CM

## 2024-01-04 PROCEDURE — 99213 OFFICE O/P EST LOW 20 MIN: CPT | Performed by: SURGERY

## 2024-01-04 NOTE — PROGRESS NOTES
Patient ID: Mariah Mendiola is a 78 y.o. female Date of Birth 1945       Chief Complaint   Patient presents with    Follow-up       Allergies:  Sulfa antibiotics    Diagnosis: Probable lipoma right back      Subjective:   The patient is an 78-year-old white female who states that she has a mass on the right side of her back which has been there since she was 15 years old.  I did notice this last August when I saw the patient for a breast check.  However, this is now measuring considerably larger and that it is now 14 cm in vertical dimension when I got somewhere between 10 and 11 on the last go around.  The patient ascribes this to seeing a chiropractor and being manipulated and having a change in position of bone which now allows this to be palpated.  She has no pain.        The following portions of the patient's history were reviewed and updated as appropriate:   Patient Active Problem List   Diagnosis    Arthritis    Acquired hypothyroidism    Fibromyalgia    Costochondritis    Chronic bilateral thoracic back pain    IFG (impaired fasting glucose)    Irritable bowel syndrome with both constipation and diarrhea    History of basal cell carcinoma (BCC) of skin    Class 1 obesity due to excess calories with serious comorbidity and body mass index (BMI) of 31.0 to 31.9 in adult    Fibrocystic breast disease    No vaccination-pt refuse    Counseling regarding advanced directives    Lipoma of back    Anxiety    Hypercholesterolemia    Depression, recurrent (HCC)    Seasonal allergic rhinitis due to pollen     Past Medical History:   Diagnosis Date    Allergic     Arthritis     Basal cell carcinoma     Breast lump     Cancer (HCC)     Disease of thyroid gland     Kidney stone     Stone     Past Surgical History:   Procedure Laterality Date    BELPHAROPTOSIS REPAIR      BREAST BIOPSY Left     BREAST CYST EXCISION Left     BREAST SURGERY      Puncture aspiration of a cyst    COLONOSCOPY      JOINT REPLACEMENT       both hips    SKIN LESION EXCISION      Back, Lt Face    TONSILLECTOMY      TOTAL HIP ARTHROPLASTY Bilateral     Impression 16    WISDOM TOOTH EXTRACTION       Social History     Socioeconomic History    Marital status: /Civil Union     Spouse name: None    Number of children: 1    Years of education: None    Highest education level: None   Occupational History    None   Tobacco Use    Smoking status: Former     Current packs/day: 0.00     Average packs/day: 1 pack/day for 6.0 years (6.0 ttl pk-yrs)     Types: Cigarettes     Start date: 1960     Quit date: 1965     Years since quittin.0    Smokeless tobacco: Never    Tobacco comments:     8 years smoking per [place]   Vaping Use    Vaping status: Never Used   Substance and Sexual Activity    Alcohol use: Yes     Comment: every now and then    Drug use: No    Sexual activity: Yes     Partners: Male     Birth control/protection: Post-menopausal   Other Topics Concern    None   Social History Narrative    Lives with her  Bill  > 45 years     3 children live locally - one hers, 2 her husbands (one son estranged for 25 years) - 8 grandchildren, 3 great grandchildren          Never a smoker - As per [employer]    No alcohol use - As per [employer]         Most recent tobacco use screenin2018    Advance directive: Yes 12/3/19    Live alone or with others: with others    Marital status:     Are you currently employed: No    Alcohol intake: None    Chewing tobacco: none    Per [place]     Social Determinants of Health     Financial Resource Strain: Low Risk  (2023)    Overall Financial Resource Strain (CARDIA)     Difficulty of Paying Living Expenses: Not very hard   Food Insecurity: Not on file   Transportation Needs: No Transportation Needs (2023)    PRAPARE - Transportation     Lack of Transportation (Medical): No     Lack of Transportation (Non-Medical): No   Physical Activity: Not on file   Stress: Not on file  "  Social Connections: Not on file   Intimate Partner Violence: Not on file   Housing Stability: Not on file        Current Outpatient Medications:     Accu-Chek Softclix Lancets lancets, Use daily Testing once daily, Disp: 100 each, Rfl: 3    Cholecalciferol (Vitamin D3) 10 MCG/ML LIQD, Take 6,000 Units by mouth daily, Disp: , Rfl:     glucose blood test strip, Use 1 each daily Testing once daily Accu Check Lashae Plus ALYCE BLOUNT test strips, Disp: 100 each, Rfl: 3    Multiple Vitamins-Minerals (MULTIVITAMIN ADULT PO), Take by mouth, Disp: , Rfl:     Synthroid 100 MCG tablet, Take 1 tablet (100 mcg total) by mouth daily BRAND NECESSARY, Disp: 30 tablet, Rfl: 5  Family History   Problem Relation Age of Onset    Diabetes Father     Hearing loss Father     Uterine cancer Family     Stroke Mother     Hearing loss Mother     Vision loss Mother     Thyroid disease Mother     No Known Problems Brother     No Known Problems Daughter     Endometrial cancer Maternal Grandmother     Heart disease Neg Hx       Review of Systems please see the above      Objective:  /82 (BP Location: Left arm, Patient Position: Sitting, Cuff Size: Standard)   Pulse 76   Temp 98.1 °F (36.7 °C) (Tympanic)   Ht 5' 7\" (1.702 m)   SpO2 98%   BMI 31.95 kg/m²     Physical Exam  Vitals reviewed.   Constitutional:       Appearance: Normal appearance. She is obese. She is not ill-appearing.   HENT:      Head: Normocephalic and atraumatic.   Eyes:      General: No scleral icterus.     Conjunctiva/sclera: Conjunctivae normal.   Cardiovascular:      Rate and Rhythm: Normal rate and regular rhythm.      Heart sounds: Normal heart sounds. No murmur heard.  Pulmonary:      Effort: Pulmonary effort is normal. No respiratory distress.      Breath sounds: Normal breath sounds. No stridor. No wheezing, rhonchi or rales.          Comments: There is a 14 cm long soft mass which appears to be directly below the skin.  This is bilobed.  This is in all " "probability a lipoma although it is technically possible that this is a large sebaceous cyst but I do not see any punctum's.  This is larger than when I saw it 5 months ago and I think this needs to be removed.  I believe that this is superficial to all muscle  Musculoskeletal:         General: Normal range of motion.      Cervical back: Normal range of motion.   Lymphadenopathy:      Cervical: No cervical adenopathy.   Skin:     General: Skin is warm.      Coloration: Skin is not jaundiced.   Neurological:      Mental Status: She is alert and oriented to person, place, and time.   Psychiatric:         Mood and Affect: Mood normal.         Behavior: Behavior normal.         Thought Content: Thought content normal.         Judgment: Judgment normal.                                    Procedures               Robert Bloch, MD      Portions of the record may have been created with voice recognition software. Occasional wrong word or \"sound a like\" substitutions may have occurred due to the inherent limitations of voice recognition software. Read the chart carefully and recognize, using context, where substitutions have occurred.    "

## 2024-01-04 NOTE — LETTER
January 4, 2024     Beti Ho DO  [address]  [address]  [facility] [address]    Patient: Mariah Mendiola   YOB: 1945   Date of Visit: 1/4/2024       Dear Dr. Ho:    Thank you for referring Mariah Mendiola to me for evaluation. Below are my notes for this consultation.    If you have questions, please do not hesitate to call me. I look forward to following your patient along with you.         Sincerely,        Robert Bloch, MD        CC: No Recipients    Robert Bloch, MD  1/4/2024  4:13 PM  Incomplete  Patient ID: Mariah Mendiola is a 78 y.o. female Date of Birth 1945       Chief Complaint   Patient presents with   • Follow-up       Allergies:  Sulfa antibiotics    Diagnosis: Probable lipoma right back      Subjective:   The patient is an 78-year-old white female who states that she has a mass on the right side of her back which has been there since she was 15 years old.  I did notice this last August when I saw the patient for a breast check.  However, this is now measuring considerably larger and that it is now 14 cm in vertical dimension when I got somewhere between 10 and 11 on the last go around.  The patient ascribes this to seeing a chiropractor and being manipulated and having a change in position of bone which now allows this to be palpated.  She has no pain.        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   • History of basal cell carcinoma (BCC) of skin   • Class 1 obesity due to excess calories with serious comorbidity and body mass index (BMI) of 31.0 to 31.9 in adult   • Fibrocystic breast disease   • No vaccination-pt refuse   • Counseling regarding advanced  directives   • Lipoma of back   • Anxiety   • Hypercholesterolemia   • Depression, recurrent (HCC)   • Seasonal allergic rhinitis due to pollen     Past Medical History:   Diagnosis Date   • Allergic    • Arthritis    • Basal cell carcinoma    • Breast lump    • Cancer (HCC)    • Disease of thyroid gland    • Kidney stone     Stone     Past Surgical History:   Procedure Laterality Date   • BELPHAROPTOSIS REPAIR     • BREAST BIOPSY Left    • BREAST CYST EXCISION Left    • BREAST SURGERY      Puncture aspiration of a cyst   • COLONOSCOPY     • JOINT REPLACEMENT      both hips   • SKIN LESION EXCISION      Back, Lt Face   • TONSILLECTOMY     • TOTAL HIP ARTHROPLASTY Bilateral     Impression 16   • WISDOM TOOTH EXTRACTION       Social History     Socioeconomic History   • Marital status: /Civil Union     Spouse name: None   • Number of children: 1   • Years of education: None   • Highest education level: None   Occupational History   • None   Tobacco Use   • Smoking status: Former     Current packs/day: 0.00     Average packs/day: 1 pack/day for 6.0 years (6.0 ttl pk-yrs)     Types: Cigarettes     Start date: 1960     Quit date: 1965     Years since quittin.0   • Smokeless tobacco: Never   • Tobacco comments:     8 years smoking per [place]   Vaping Use   • Vaping status: Never Used   Substance and Sexual Activity   • Alcohol use: Yes     Comment: every now and then   • Drug use: No   • Sexual activity: Yes     Partners: Male     Birth control/protection: Post-menopausal   Other Topics Concern   • None   Social History Narrative    Lives with her  Bill  > 45 years     3 children live locally - one hers, 2 her husbands (one son estranged for 25 years) - 8 grandchildren, 3 great grandchildren          Never a smoker - As per [employer]    No alcohol use - As per [employer]         Most recent tobacco use screenin2018    Advance directive: Yes 12/3/19    Live alone or with others:  "with others    Marital status:     Are you currently employed: No    Alcohol intake: None    Chewing tobacco: none    Per [place]     Social Determinants of Health     Financial Resource Strain: Low Risk  (9/28/2023)    Overall Financial Resource Strain (CARDIA)    • Difficulty of Paying Living Expenses: Not very hard   Food Insecurity: Not on file   Transportation Needs: No Transportation Needs (9/28/2023)    PRAPARE - Transportation    • Lack of Transportation (Medical): No    • Lack of Transportation (Non-Medical): No   Physical Activity: Not on file   Stress: Not on file   Social Connections: Not on file   Intimate Partner Violence: Not on file   Housing Stability: Not on file        Current Outpatient Medications:   •  Accu-Chek Softclix Lancets lancets, Use daily Testing once daily, Disp: 100 each, Rfl: 3  •  Cholecalciferol (Vitamin D3) 10 MCG/ML LIQD, Take 6,000 Units by mouth daily, Disp: , Rfl:   •  glucose blood test strip, Use 1 each daily Testing once daily Accu Check Lashae Plus ALYCE BLOUNT test strips, Disp: 100 each, Rfl: 3  •  Multiple Vitamins-Minerals (MULTIVITAMIN ADULT PO), Take by mouth, Disp: , Rfl:   •  Synthroid 100 MCG tablet, Take 1 tablet (100 mcg total) by mouth daily BRAND NECESSARY, Disp: 30 tablet, Rfl: 5  Family History   Problem Relation Age of Onset   • Diabetes Father    • Hearing loss Father    • Uterine cancer Family    • Stroke Mother    • Hearing loss Mother    • Vision loss Mother    • Thyroid disease Mother    • No Known Problems Brother    • No Known Problems Daughter    • Endometrial cancer Maternal Grandmother    • Heart disease Neg Hx       Review of Systems please see the above      Objective:  /82 (BP Location: Left arm, Patient Position: Sitting, Cuff Size: Standard)   Pulse 76   Temp 98.1 °F (36.7 °C) (Tympanic)   Ht 5' 7\" (1.702 m)   SpO2 98%   BMI 31.95 kg/m²     Physical Exam                               Procedures               Robert Bloch, " "MD      Portions of the record may have been created with voice recognition software. Occasional wrong word or \"sound a like\" substitutions may have occurred due to the inherent limitations of voice recognition software. Read the chart carefully and recognize, using context, where substitutions have occurred.    "

## 2024-01-04 NOTE — H&P (VIEW-ONLY)
Patient ID: Mariah Mendiola is a 78 y.o. female Date of Birth 1945       Chief Complaint   Patient presents with    Follow-up       Allergies:  Sulfa antibiotics    Diagnosis: Probable lipoma right back      Subjective:   The patient is an 78-year-old white female who states that she has a mass on the right side of her back which has been there since she was 15 years old.  I did notice this last August when I saw the patient for a breast check.  However, this is now measuring considerably larger and that it is now 14 cm in vertical dimension when I got somewhere between 10 and 11 on the last go around.  The patient ascribes this to seeing a chiropractor and being manipulated and having a change in position of bone which now allows this to be palpated.  She has no pain.        The following portions of the patient's history were reviewed and updated as appropriate:   Patient Active Problem List   Diagnosis    Arthritis    Acquired hypothyroidism    Fibromyalgia    Costochondritis    Chronic bilateral thoracic back pain    IFG (impaired fasting glucose)    Irritable bowel syndrome with both constipation and diarrhea    History of basal cell carcinoma (BCC) of skin    Class 1 obesity due to excess calories with serious comorbidity and body mass index (BMI) of 31.0 to 31.9 in adult    Fibrocystic breast disease    No vaccination-pt refuse    Counseling regarding advanced directives    Lipoma of back    Anxiety    Hypercholesterolemia    Depression, recurrent (HCC)    Seasonal allergic rhinitis due to pollen     Past Medical History:   Diagnosis Date    Allergic     Arthritis     Basal cell carcinoma     Breast lump     Cancer (HCC)     Disease of thyroid gland     Kidney stone     Stone     Past Surgical History:   Procedure Laterality Date    BELPHAROPTOSIS REPAIR      BREAST BIOPSY Left     BREAST CYST EXCISION Left     BREAST SURGERY      Puncture aspiration of a cyst    COLONOSCOPY      JOINT REPLACEMENT       both hips    SKIN LESION EXCISION      Back, Lt Face    TONSILLECTOMY      TOTAL HIP ARTHROPLASTY Bilateral     Impression 16    WISDOM TOOTH EXTRACTION       Social History     Socioeconomic History    Marital status: /Civil Union     Spouse name: None    Number of children: 1    Years of education: None    Highest education level: None   Occupational History    None   Tobacco Use    Smoking status: Former     Current packs/day: 0.00     Average packs/day: 1 pack/day for 6.0 years (6.0 ttl pk-yrs)     Types: Cigarettes     Start date: 1960     Quit date: 1965     Years since quittin.0    Smokeless tobacco: Never    Tobacco comments:     8 years smoking per [facility]   Vaping Use    Vaping status: Never Used   Substance and Sexual Activity    Alcohol use: Yes     Comment: every now and then    Drug use: No    Sexual activity: Yes     Partners: Male     Birth control/protection: Post-menopausal   Other Topics Concern    None   Social History Narrative    Lives with her  Bill  > 45 years     3 children live locally - one hers, 2 her husbands (one son estranged for 25 years) - 8 grandchildren, 3 great grandchildren          Never a smoker - As per [employer]    No alcohol use - As per [employer]         Most recent tobacco use screenin2018    Advance directive: Yes 12/3/19    Live alone or with others: with others    Marital status:     Are you currently employed: No    Alcohol intake: None    Chewing tobacco: none    Per [place]     Social Determinants of Health     Financial Resource Strain: Low Risk  (2023)    Overall Financial Resource Strain (CARDIA)     Difficulty of Paying Living Expenses: Not very hard   Food Insecurity: Not on file   Transportation Needs: No Transportation Needs (2023)    PRAPARE - Transportation     Lack of Transportation (Medical): No     Lack of Transportation (Non-Medical): No   Physical Activity: Not on file   Stress: Not on file  "  Social Connections: Not on file   Intimate Partner Violence: Not on file   Housing Stability: Not on file        Current Outpatient Medications:     Accu-Chek Softclix Lancets lancets, Use daily Testing once daily, Disp: 100 each, Rfl: 3    Cholecalciferol (Vitamin D3) 10 MCG/ML LIQD, Take 6,000 Units by mouth daily, Disp: , Rfl:     glucose blood test strip, Use 1 each daily Testing once daily Accu Check Lashae Plus ALYCE BLOUNT test strips, Disp: 100 each, Rfl: 3    Multiple Vitamins-Minerals (MULTIVITAMIN ADULT PO), Take by mouth, Disp: , Rfl:     Synthroid 100 MCG tablet, Take 1 tablet (100 mcg total) by mouth daily BRAND NECESSARY, Disp: 30 tablet, Rfl: 5  Family History   Problem Relation Age of Onset    Diabetes Father     Hearing loss Father     Uterine cancer Family     Stroke Mother     Hearing loss Mother     Vision loss Mother     Thyroid disease Mother     No Known Problems Brother     No Known Problems Daughter     Endometrial cancer Maternal Grandmother     Heart disease Neg Hx       Review of Systems please see the above      Objective:  /82 (BP Location: Left arm, Patient Position: Sitting, Cuff Size: Standard)   Pulse 76   Temp 98.1 °F (36.7 °C) (Tympanic)   Ht 5' 7\" (1.702 m)   SpO2 98%   BMI 31.95 kg/m²     Physical Exam  Vitals reviewed.   Constitutional:       Appearance: Normal appearance. She is obese. She is not ill-appearing.   HENT:      Head: Normocephalic and atraumatic.   Eyes:      General: No scleral icterus.     Conjunctiva/sclera: Conjunctivae normal.   Cardiovascular:      Rate and Rhythm: Normal rate and regular rhythm.      Heart sounds: Normal heart sounds. No murmur heard.  Pulmonary:      Effort: Pulmonary effort is normal. No respiratory distress.      Breath sounds: Normal breath sounds. No stridor. No wheezing, rhonchi or rales.          Comments: There is a 14 cm long soft mass which appears to be directly below the skin.  This is bilobed.  This is in all " "probability a lipoma although it is technically possible that this is a large sebaceous cyst but I do not see any punctum's.  This is larger than when I saw it 5 months ago and I think this needs to be removed.  I believe that this is superficial to all muscle  Musculoskeletal:         General: Normal range of motion.      Cervical back: Normal range of motion.   Lymphadenopathy:      Cervical: No cervical adenopathy.   Skin:     General: Skin is warm.      Coloration: Skin is not jaundiced.   Neurological:      Mental Status: She is alert and oriented to person, place, and time.   Psychiatric:         Mood and Affect: Mood normal.         Behavior: Behavior normal.         Thought Content: Thought content normal.         Judgment: Judgment normal.                                    Procedures               Robert Bloch, MD      Portions of the record may have been created with voice recognition software. Occasional wrong word or \"sound a like\" substitutions may have occurred due to the inherent limitations of voice recognition software. Read the chart carefully and recognize, using context, where substitutions have occurred.    "

## 2024-01-05 ENCOUNTER — APPOINTMENT (OUTPATIENT)
Dept: LAB | Facility: HOSPITAL | Age: 79
End: 2024-01-05
Payer: COMMERCIAL

## 2024-01-05 DIAGNOSIS — D17.1 LIPOMA OF BACK: ICD-10-CM

## 2024-01-05 LAB
ALBUMIN SERPL BCP-MCNC: 4 G/DL (ref 3.5–5)
ALP SERPL-CCNC: 69 U/L (ref 34–104)
ALT SERPL W P-5'-P-CCNC: 13 U/L (ref 7–52)
ANION GAP SERPL CALCULATED.3IONS-SCNC: 8 MMOL/L
AST SERPL W P-5'-P-CCNC: 18 U/L (ref 13–39)
BILIRUB SERPL-MCNC: 0.48 MG/DL (ref 0.2–1)
BUN SERPL-MCNC: 17 MG/DL (ref 5–25)
CALCIUM SERPL-MCNC: 9.4 MG/DL (ref 8.4–10.2)
CHLORIDE SERPL-SCNC: 104 MMOL/L (ref 96–108)
CO2 SERPL-SCNC: 27 MMOL/L (ref 21–32)
CREAT SERPL-MCNC: 0.73 MG/DL (ref 0.6–1.3)
ERYTHROCYTE [DISTWIDTH] IN BLOOD BY AUTOMATED COUNT: 13 % (ref 11.6–15.1)
GFR SERPL CREATININE-BSD FRML MDRD: 78 ML/MIN/1.73SQ M
GLUCOSE SERPL-MCNC: 103 MG/DL (ref 65–140)
HCT VFR BLD AUTO: 43.2 % (ref 34.8–46.1)
HGB BLD-MCNC: 13.8 G/DL (ref 11.5–15.4)
MCH RBC QN AUTO: 29.7 PG (ref 26.8–34.3)
MCHC RBC AUTO-ENTMCNC: 31.9 G/DL (ref 31.4–37.4)
MCV RBC AUTO: 93 FL (ref 82–98)
PLATELET # BLD AUTO: 293 THOUSANDS/UL (ref 149–390)
PMV BLD AUTO: 10.5 FL (ref 8.9–12.7)
POTASSIUM SERPL-SCNC: 3.8 MMOL/L (ref 3.5–5.3)
PROT SERPL-MCNC: 7.3 G/DL (ref 6.4–8.4)
RBC # BLD AUTO: 4.64 MILLION/UL (ref 3.81–5.12)
SODIUM SERPL-SCNC: 139 MMOL/L (ref 135–147)
WBC # BLD AUTO: 8.31 THOUSAND/UL (ref 4.31–10.16)

## 2024-01-05 PROCEDURE — 85027 COMPLETE CBC AUTOMATED: CPT

## 2024-01-05 PROCEDURE — 80053 COMPREHEN METABOLIC PANEL: CPT

## 2024-01-05 PROCEDURE — 36415 COLL VENOUS BLD VENIPUNCTURE: CPT

## 2024-01-09 LAB
ATRIAL RATE: 73 BPM
P AXIS: 51 DEGREES
PR INTERVAL: 126 MS
QRS AXIS: 2 DEGREES
QRSD INTERVAL: 80 MS
QT INTERVAL: 392 MS
QTC INTERVAL: 431 MS
T WAVE AXIS: 23 DEGREES
VENTRICULAR RATE: 73 BPM

## 2024-01-09 RX ORDER — BIOTIN 10 MG
TABLET ORAL
COMMUNITY

## 2024-01-09 NOTE — PRE-PROCEDURE INSTRUCTIONS
Pre-Surgery Instructions:   Medication Instructions    Cholecalciferol (Vitamin D3) 10 MCG/ML LIQD Stop taking 7 days prior to surgery.    COLLAGEN PO Stop taking 7 days prior to surgery.last dose 1/9/24    Cyanocobalamin (Vitamin B-12) 3000 MCG SUBL Stop taking 7 days prior to surgery.    NON FORMULARY Stop taking 7 days prior to surgery.    NON FORMULARY Stop taking 7 days prior to surgery.    Synthroid 100 MCG tablet Take day of surgery.    Medication instructions for day surgery reviewed. Please use only a sip of water to take your instructed medications. Avoid all over the counter vitamins, supplements and NSAIDS for one week prior to surgery per anesthesia guidelines. Tylenol is ok to take as needed.     You will receive a call one business day prior to surgery with an arrival time and hospital directions. If your surgery is scheduled on a Monday, the hospital will be calling you on the Friday prior to your surgery. If you have not heard from anyone by 8pm, please call the hospital supervisor through the hospital  at [phone]. ([place] [phone]).    Do not eat or drink anything after midnight the night before your surgery, including candy, mints, lifesavers, or chewing gum. Do not drink alcohol 24hrs before your surgery. Try not to smoke at least 24hrs before your surgery.       Follow the pre surgery showering instructions as listed in the “My Surgical Experience Booklet” or otherwise provided by your surgeon's office. Do not use a blade to shave the surgical area 1 week before surgery. It is okay to use a clean electric clippers up to 24 hours before surgery. Do not apply any lotions, creams, including makeup, cologne, deodorant, or perfumes after showering on the day of your surgery. Do not use dry shampoo, hair spray, hair gel, or any type of hair products.     No contact lenses, eye make-up, or artificial eyelashes. Remove nail polish, including gel polish, and any artificial, gel, or  acrylic nails if possible. Remove all jewelry including rings and body piercing jewelry.     Wear causal clothing that is easy to take on and off. Consider your type of surgery.    Keep any valuables, jewelry, piercings at home. Please bring any specially ordered equipment (sling, braces) if indicated.    Arrange for a responsible person to drive you to and from the hospital on the day of your surgery. Visitor Guidelines discussed.     Call the surgeon's office with any new illnesses, exposures, or additional questions prior to surgery.    Please reference your “My Surgical Experience Booklet” for additional information to prepare for your upcoming surgery.

## 2024-01-10 ENCOUNTER — HOSPITAL ENCOUNTER (OUTPATIENT)
Facility: HOSPITAL | Age: 79
Setting detail: OUTPATIENT SURGERY
Discharge: HOME | End: 2024-01-10
Attending: SURGERY | Admitting: SURGERY
Payer: COMMERCIAL

## 2024-01-10 ENCOUNTER — ANESTHESIA (OUTPATIENT)
Dept: PERIOP | Facility: HOSPITAL | Age: 79
End: 2024-01-10
Payer: COMMERCIAL

## 2024-01-10 ENCOUNTER — ANESTHESIA EVENT (OUTPATIENT)
Dept: PERIOP | Facility: HOSPITAL | Age: 79
End: 2024-01-10
Payer: COMMERCIAL

## 2024-01-10 VITALS
HEIGHT: 67 IN | TEMPERATURE: 97.3 F | DIASTOLIC BLOOD PRESSURE: 61 MMHG | RESPIRATION RATE: 16 BRPM | OXYGEN SATURATION: 100 % | HEART RATE: 70 BPM | BODY MASS INDEX: 31.08 KG/M2 | WEIGHT: 198 LBS | SYSTOLIC BLOOD PRESSURE: 124 MMHG

## 2024-01-10 DIAGNOSIS — D17.1 LIPOMA OF BACK: ICD-10-CM

## 2024-01-10 PROCEDURE — 11406 EXC TR-EXT B9+MARG >4.0 CM: CPT | Performed by: SURGERY

## 2024-01-10 PROCEDURE — NC001 PR NO CHARGE: Performed by: SURGERY

## 2024-01-10 PROCEDURE — 88304 TISSUE EXAM BY PATHOLOGIST: CPT | Performed by: PATHOLOGY

## 2024-01-10 RX ORDER — FENTANYL CITRATE 50 UG/ML
INJECTION, SOLUTION INTRAMUSCULAR; INTRAVENOUS AS NEEDED
Status: DISCONTINUED | OUTPATIENT
Start: 2024-01-10 | End: 2024-01-10

## 2024-01-10 RX ORDER — CEFAZOLIN SODIUM 2 G/50ML
2000 SOLUTION INTRAVENOUS ONCE
Status: COMPLETED | OUTPATIENT
Start: 2024-01-10 | End: 2024-01-10

## 2024-01-10 RX ORDER — PROPOFOL 10 MG/ML
INJECTION, EMULSION INTRAVENOUS AS NEEDED
Status: DISCONTINUED | OUTPATIENT
Start: 2024-01-10 | End: 2024-01-10

## 2024-01-10 RX ORDER — SODIUM CHLORIDE, SODIUM LACTATE, POTASSIUM CHLORIDE, CALCIUM CHLORIDE 600; 310; 30; 20 MG/100ML; MG/100ML; MG/100ML; MG/100ML
INJECTION, SOLUTION INTRAVENOUS CONTINUOUS PRN
Status: DISCONTINUED | OUTPATIENT
Start: 2024-01-10 | End: 2024-01-10

## 2024-01-10 RX ORDER — OXYCODONE HYDROCHLORIDE AND ACETAMINOPHEN 5; 325 MG/1; MG/1
1 TABLET ORAL ONCE AS NEEDED
Status: DISCONTINUED | OUTPATIENT
Start: 2024-01-10 | End: 2024-01-10 | Stop reason: HOSPADM

## 2024-01-10 RX ORDER — DEXAMETHASONE SODIUM PHOSPHATE 10 MG/ML
INJECTION, SOLUTION INTRAMUSCULAR; INTRAVENOUS AS NEEDED
Status: DISCONTINUED | OUTPATIENT
Start: 2024-01-10 | End: 2024-01-10

## 2024-01-10 RX ORDER — FENTANYL CITRATE/PF 50 MCG/ML
25 SYRINGE (ML) INJECTION
Status: DISCONTINUED | OUTPATIENT
Start: 2024-01-10 | End: 2024-01-10 | Stop reason: HOSPADM

## 2024-01-10 RX ORDER — ONDANSETRON 2 MG/ML
4 INJECTION INTRAMUSCULAR; INTRAVENOUS ONCE AS NEEDED
Status: COMPLETED | OUTPATIENT
Start: 2024-01-10 | End: 2024-01-10

## 2024-01-10 RX ORDER — KETAMINE HCL IN NACL, ISO-OSM 100MG/10ML
SYRINGE (ML) INJECTION AS NEEDED
Status: DISCONTINUED | OUTPATIENT
Start: 2024-01-10 | End: 2024-01-10

## 2024-01-10 RX ORDER — MAGNESIUM HYDROXIDE 1200 MG/15ML
LIQUID ORAL AS NEEDED
Status: DISCONTINUED | OUTPATIENT
Start: 2024-01-10 | End: 2024-01-10 | Stop reason: HOSPADM

## 2024-01-10 RX ORDER — OXYCODONE HYDROCHLORIDE AND ACETAMINOPHEN 5; 325 MG/1; MG/1
1 TABLET ORAL EVERY 4 HOURS PRN
Qty: 5 TABLET | Refills: 0 | Status: SHIPPED | OUTPATIENT
Start: 2024-01-10 | End: 2024-01-13

## 2024-01-10 RX ORDER — LIDOCAINE HYDROCHLORIDE 10 MG/ML
INJECTION, SOLUTION EPIDURAL; INFILTRATION; INTRACAUDAL; PERINEURAL AS NEEDED
Status: DISCONTINUED | OUTPATIENT
Start: 2024-01-10 | End: 2024-01-10

## 2024-01-10 RX ORDER — PROPOFOL 10 MG/ML
INJECTION, EMULSION INTRAVENOUS CONTINUOUS PRN
Status: DISCONTINUED | OUTPATIENT
Start: 2024-01-10 | End: 2024-01-10

## 2024-01-10 RX ORDER — ONDANSETRON 2 MG/ML
INJECTION INTRAMUSCULAR; INTRAVENOUS AS NEEDED
Status: DISCONTINUED | OUTPATIENT
Start: 2024-01-10 | End: 2024-01-10

## 2024-01-10 RX ADMIN — ONDANSETRON 4 MG: 2 INJECTION INTRAMUSCULAR; INTRAVENOUS at 08:55

## 2024-01-10 RX ADMIN — Medication 20 MG: at 09:10

## 2024-01-10 RX ADMIN — FENTANYL CITRATE 25 MCG: 50 INJECTION INTRAMUSCULAR; INTRAVENOUS at 09:01

## 2024-01-10 RX ADMIN — SODIUM CHLORIDE, SODIUM LACTATE, POTASSIUM CHLORIDE, AND CALCIUM CHLORIDE: .6; .31; .03; .02 INJECTION, SOLUTION INTRAVENOUS at 08:44

## 2024-01-10 RX ADMIN — Medication 10 MG: at 09:03

## 2024-01-10 RX ADMIN — LIDOCAINE HYDROCHLORIDE 50 MG: 10 INJECTION, SOLUTION EPIDURAL; INFILTRATION; INTRACAUDAL; PERINEURAL at 09:01

## 2024-01-10 RX ADMIN — PROPOFOL 100 MCG/KG/MIN: 10 INJECTION, EMULSION INTRAVENOUS at 09:01

## 2024-01-10 RX ADMIN — CEFAZOLIN SODIUM 2000 MG: 2 SOLUTION INTRAVENOUS at 08:55

## 2024-01-10 RX ADMIN — DEXAMETHASONE SODIUM PHOSPHATE 10 MG: 10 INJECTION INTRAMUSCULAR; INTRAVENOUS at 09:05

## 2024-01-10 RX ADMIN — ONDANSETRON 4 MG: 2 INJECTION INTRAMUSCULAR; INTRAVENOUS at 10:09

## 2024-01-10 NOTE — ANESTHESIA POSTPROCEDURE EVALUATION
Post-Op Assessment Note    CV Status:  Stable  Pain Score: 0    Pain management: adequate       Mental Status:  Alert and awake   Hydration Status:  Euvolemic   PONV Controlled:  Controlled   Airway Patency:  Patent     Post Op Vitals Reviewed: Yes      Staff: CRNA               BP   136/64   Temp   97.4   Pulse  67   Resp   15   SpO2   99%

## 2024-01-10 NOTE — ANESTHESIA PREPROCEDURE EVALUATION
Procedure:  EXCISION MASS RIGHT SIDE OF BACK (Right: Back)    Relevant Problems   CARDIO   (+) Chronic bilateral thoracic back pain   (+) Hypercholesterolemia      ENDO   (+) Acquired hypothyroidism      MUSCULOSKELETAL   (+) Arthritis   (+) Chronic bilateral thoracic back pain   (+) Fibromyalgia      NEURO/PSYCH   (+) Anxiety   (+) Chronic bilateral thoracic back pain   (+) Depression, recurrent (HCC)   (+) Fibromyalgia        Physical Exam    Airway    Mallampati score: II  TM Distance: >3 FB  Neck ROM: full     Dental   Comment: Partial upper, No notable dental hx     Cardiovascular  Cardiovascular exam normal    Pulmonary  Pulmonary exam normal     Other Findings  post-pubertal.      Anesthesia Plan  ASA Score- 2     Anesthesia Type- IV sedation with anesthesia with ASA Monitors.         Additional Monitors:     Airway Plan:     Comment: Patients daughter has pseudocholineesterase deficiency..       Plan Factors-Exercise tolerance (METS): >4 METS.    Chart reviewed.   Existing labs reviewed. Patient summary reviewed.    Patient is not a current smoker.      Obstructive sleep apnea risk education given perioperatively.        Induction-     Postoperative Plan-     Informed Consent- Anesthetic plan and risks discussed with patient.  I personally reviewed this patient with the CRNA. Discussed and agreed on the Anesthesia Plan with the CRNA..

## 2024-01-10 NOTE — OP NOTE
OPERATIVE REPORT  PATIENT NAME: Mariah Mendiola    :  1945  MRN: [id number]  Pt Location: [facility] OR ROOM 03    SURGERY DATE: 1/10/2024    Surgeons and Role:     * Robert Bloch, MD - Primary     * KARINA Finney-C - Assisting    Preop Diagnosis:  Lipoma of back [D17.1]    Post-Op Diagnosis Codes:     * Lipoma of back [D17.1]    Procedure(s):  Right - EXCISION MASS RIGHT SIDE OF BACK    Specimen(s):  ID Type Source Tests Collected by Time Destination   1 : right back mass Tissue Back TISSUE EXAM Robert Bloch, MD 1/10/2024  9:21 AM        Estimated Blood Loss:   5 mL    Drains:  * No LDAs found *    Anesthesia Type:   IV Sedation with Anesthesia    Operative Indications:  Lipoma of back [D17.1]      Operative Findings:  Large sebaceous cyst of back    Complications:   None    Procedure and Technique:  The patient was identified by me and placed in the left lateral decubitus position on the operating room table.  An beanbag was in place and the patient was positioned correctly.  The area was prepped and draped and then marked and a timeout was done.  Local anesthesia which was 1% lidocaine with epinephrine neutralized with bicarb is injected.  I used about 24 cc of this throughout the case.  Long elliptical skin incision is made around a mass which appears to be attached to the skin.  On the most inferior portion a nick was made into this and there was obvious that this was a sebaceous cyst.  This was now dissected free with a combination of sharp dissection with a knife and with the Bovie.  I had to extend with a second elliptical incision inferior medially to get to the end of the cyst.  This was done and the cyst was fully removed.  Wounds irrigated and then closed with interrupted Vicryl followed by Monocryl and Exofin.  There was no qualified resident to assist, accordingly Cinthya COLLINS was the first assistant  Size of the lesion: 14-1/2 x 3-1/2 x 3 cm  Size of the intermediate closure: 14-1/2 cm   I  was present for the entire procedure. and A physician assistant was required during the procedure for retraction, tissue handling, dissection and suturing.    Patient Disposition:  PACU     This procedure was not performed to treat primary cutaneous melanoma through wide local excision      SIGNATURE: Robert Bloch, MD  DATE: January 10, 2024  TIME: 10:20 AM

## 2024-01-10 NOTE — DISCHARGE INSTR - AVS FIRST PAGE
DISCHARGE INSTRUCTIONS:    FOLLOW UP APPOINTMENT: Following discharge from the hospital, please call the office as soon as possible to set up a post-operative appointment to be seen by Dr. Bloch in 1-2 weeks.      INCISION SITES:  - You may apply ice to the incisions to help with pain.   - It is normal to have some bruising, swelling or mild discoloration around the incision.  If increasing redness or pain develops, call our office immediately.   -Do not apply any creams, lotions, or ointments unless instructed to do so by your surgeon.  If you have surgical adhesive glue:  - The incisions are closed with dissolvable sutures underneath the skin and a surgical adhesive glue overlying the skin.  - Do not pick at the adhesive. It will naturally wear off with time.    WOUND CARE:  -Avoid wearing tight adhering, irritative clothing during your healing process.   -You may gently shower 24 hours after surgery, let water and soap run down and pat dry; do not scrub the incision sites.   -No baths, hot tubs, or swimming x 6 weeks or until otherwise cleared by surgical team.    PAIN CONTROL/MEDICATIONS:  -Recommend taking over the counter pain medication such as Tylenol OR Ibuprofen first to help with pain; read and follow labels on bottles.  -Only use prescribed pain medications as needed and try to taper down use overtime. Do not drive or operate heavy machinery while on prescription pain medications. Do not take with alcohol.  - If you were given a prescription for Percocet, Norco, or Vicodin for pain be sure to eat prior to taking as these medications as they may cause nausea and vomiting on an empty stomach.    -DO NOT take Tylenol  (acetaminophen) with prescribed pain medication for a fever or for further pain control as these medications already contain Tylenol in them. Do not exceed more than 4000 mg of acetaminophen in 24 hours or 3000 mg if you have liver disease.   -You may apply ice at the incision site as needed  for 20-30 minutes on/off to decrease pain or swelling.       DIET/LIFE HABITS:  -Advance diet as tolerated.   -Stay hydrated. Drink plenty of non-caffienated, non-alcoholic beverages such as water, juices, popsicles, etc.  -Abstain from drinking alcohol as this can interrupt wound healing and increase chance of unwanted bleeding.   -No smoking at least 2 weeks post surgery, this can delay wound healing. Smoking cessation is encouraged and we can provide you with options to facilitate cessation.    ACTIVITY/RESTRICTIONS:  -No strenuous exercise and no heavy lifting, pulling, or pushing >10-15 lbs x 4 weeks or until cleared by surgeon.  -Gradually increase your activity daily. Walking 3-4 times daily is good and stairs are ok.  Listen to your body.  If you start to get tired or sore then rest.   -No driving until you are pain free and can wear a seatbelt comfortably   No driving if taking prescription pain medication.       RETURN TO WORK:  -You may return to work or other activities as soon as your pain is controlled and you feel comfortable. For many people, this is a few days after surgery. If your job requires heavy lifting you will need to be on light duty for 2-3 weeks.     CALL THE OFFICE IF/RETURN TO ED:  -Fevers/chills with uncontrolled temperature > 100.4 F.  -Increasing severe pain uncontrolled with pain medicine.  -Incision site is having thick, yellow drainage, increasing redness, is warm to the touch, or becoming increasingly tender.  -Unexplained bleeding.  -Any changes in overall gideon such as: nausea/vomitting, fevers/chills, diarrhea/constipation, inability/difficulty urinating, chest pains, palpations, trouble breathing, coughing, excessive fatigue/weakness, etc.

## 2024-01-12 ENCOUNTER — TELEPHONE (OUTPATIENT)
Dept: SURGERY | Facility: CLINIC | Age: 79
End: 2024-01-12

## 2024-01-12 NOTE — TELEPHONE ENCOUNTER
Patient called in wanting to know if anesthesia has anything to do with anxiety. Her stomach is a mess. Her EKG showed something she is concerned about. Can you call her?

## 2024-01-15 PROCEDURE — 88304 TISSUE EXAM BY PATHOLOGIST: CPT | Performed by: PATHOLOGY

## 2024-01-16 ENCOUNTER — TELEPHONE (OUTPATIENT)
Age: 79
End: 2024-01-16

## 2024-01-16 DIAGNOSIS — Z84.89 FAMILY HISTORY OF GENETIC DISORDER: Primary | ICD-10-CM

## 2024-01-16 NOTE — TELEPHONE ENCOUNTER
She can have blood work any time. I put the order in for pseudocholinesterase, but I do not know that this will be covered by insurance. She should look into this before getting this done.

## 2024-01-16 NOTE — TELEPHONE ENCOUNTER
Mariah called has a couple question for Dr Ho    Being that she recently had a mass removed from her back on 1/10, how long does she have to wait to go get her BW done?    Wants to know if you can add BW order for Pseudocholinesterase deficiency because her daughter had high deficiency and had a bad experience. Mariah is concern because in th event there's an ER she wouldn't be able to tell them.     Please advise

## 2024-01-18 ENCOUNTER — OFFICE VISIT (OUTPATIENT)
Dept: SURGERY | Facility: CLINIC | Age: 79
End: 2024-01-18

## 2024-01-18 VITALS
HEART RATE: 86 BPM | WEIGHT: 203.2 LBS | DIASTOLIC BLOOD PRESSURE: 80 MMHG | TEMPERATURE: 97.3 F | HEIGHT: 67 IN | OXYGEN SATURATION: 98 % | BODY MASS INDEX: 31.89 KG/M2 | SYSTOLIC BLOOD PRESSURE: 122 MMHG

## 2024-01-18 DIAGNOSIS — L72.3 SEBACEOUS CYST: Primary | ICD-10-CM

## 2024-01-18 PROCEDURE — 99024 POSTOP FOLLOW-UP VISIT: CPT | Performed by: SURGERY

## 2024-01-18 NOTE — PROGRESS NOTES
First postoperative visit from excision of what I had first thought was a lipoma.  Turns out this was a 13-1/2 cm sebaceous cyst.  On examination today the wound is healing beautifully.  There is no hematoma, seroma or infection.  The patient however has a myriad number of complaints which is not unexpected.  I reassured her that everything is fine and that she can start doing activities and build herself up to normal activity within the next 2 or 3 weeks.  I will see her as needed

## 2024-01-18 NOTE — PATIENT INSTRUCTIONS
She can pull the glue off in a couple days.  I told her to wait another 2 weeks before she starts playing [employer] ball

## 2024-01-18 NOTE — LETTER
January 18, 2024     Beti Ho DO  [address]  [address]  [facility] [facility] [address]    Patient: Mariah Mendiola   YOB: 1945   Date of Visit: 1/18/2024       Dear Dr. Ho:    Thank you for referring Mariah Mendiola to me for evaluation. Below are my notes for this consultation.    If you have questions, please do not hesitate to call me. I look forward to following your patient along with you.         Sincerely,        Robert Bloch, MD        CC: No Recipients

## 2024-01-27 ENCOUNTER — APPOINTMENT (OUTPATIENT)
Dept: LAB | Facility: HOSPITAL | Age: 79
End: 2024-01-27
Payer: COMMERCIAL

## 2024-01-27 DIAGNOSIS — R73.01 IFG (IMPAIRED FASTING GLUCOSE): ICD-10-CM

## 2024-01-27 DIAGNOSIS — E66.09 CLASS 1 OBESITY DUE TO EXCESS CALORIES WITH SERIOUS COMORBIDITY AND BODY MASS INDEX (BMI) OF 31.0 TO 31.9 IN ADULT: ICD-10-CM

## 2024-01-27 DIAGNOSIS — M79.7 FIBROMYALGIA: ICD-10-CM

## 2024-01-27 DIAGNOSIS — E78.00 HYPERCHOLESTEROLEMIA: ICD-10-CM

## 2024-01-27 DIAGNOSIS — Z84.89 FAMILY HISTORY OF GENETIC DISORDER: ICD-10-CM

## 2024-01-27 DIAGNOSIS — F41.9 ANXIETY: ICD-10-CM

## 2024-01-27 DIAGNOSIS — E03.9 ACQUIRED HYPOTHYROIDISM: ICD-10-CM

## 2024-01-27 DIAGNOSIS — Z71.89 COUNSELING REGARDING ADVANCED DIRECTIVES: ICD-10-CM

## 2024-01-27 LAB
25(OH)D3 SERPL-MCNC: 48.3 NG/ML (ref 30–100)
ALBUMIN SERPL BCP-MCNC: 3.9 G/DL (ref 3.5–5)
ALP SERPL-CCNC: 68 U/L (ref 34–104)
ALT SERPL W P-5'-P-CCNC: 13 U/L (ref 7–52)
ANION GAP SERPL CALCULATED.3IONS-SCNC: 8 MMOL/L
AST SERPL W P-5'-P-CCNC: 17 U/L (ref 13–39)
BILIRUB SERPL-MCNC: 0.65 MG/DL (ref 0.2–1)
BUN SERPL-MCNC: 14 MG/DL (ref 5–25)
CALCIUM SERPL-MCNC: 9.5 MG/DL (ref 8.4–10.2)
CHLORIDE SERPL-SCNC: 104 MMOL/L (ref 96–108)
CHOLEST SERPL-MCNC: 214 MG/DL
CO2 SERPL-SCNC: 28 MMOL/L (ref 21–32)
CREAT SERPL-MCNC: 0.79 MG/DL (ref 0.6–1.3)
CRP SERPL QL: 5.2 MG/L
EST. AVERAGE GLUCOSE BLD GHB EST-MCNC: 128 MG/DL
GFR SERPL CREATININE-BSD FRML MDRD: 70 ML/MIN/1.73SQ M
GLUCOSE P FAST SERPL-MCNC: 94 MG/DL (ref 65–99)
HBA1C MFR BLD: 6.1 %
HCYS SERPL-SCNC: 13.1 UMOL/L (ref 5–20)
HDLC SERPL-MCNC: 66 MG/DL
LDLC SERPL CALC-MCNC: 136 MG/DL (ref 0–100)
NONHDLC SERPL-MCNC: 148 MG/DL
POTASSIUM SERPL-SCNC: 4 MMOL/L (ref 3.5–5.3)
PROT SERPL-MCNC: 7.1 G/DL (ref 6.4–8.4)
SODIUM SERPL-SCNC: 140 MMOL/L (ref 135–147)
TRIGL SERPL-MCNC: 62 MG/DL
TSH SERPL DL<=0.05 MIU/L-ACNC: 2.93 UIU/ML (ref 0.45–4.5)

## 2024-01-27 PROCEDURE — 86140 C-REACTIVE PROTEIN: CPT

## 2024-01-27 PROCEDURE — 82306 VITAMIN D 25 HYDROXY: CPT

## 2024-01-27 PROCEDURE — 80053 COMPREHEN METABOLIC PANEL: CPT

## 2024-01-27 PROCEDURE — 80061 LIPID PANEL: CPT

## 2024-01-27 PROCEDURE — 84443 ASSAY THYROID STIM HORMONE: CPT

## 2024-01-27 PROCEDURE — 83090 ASSAY OF HOMOCYSTEINE: CPT

## 2024-01-27 PROCEDURE — 83036 HEMOGLOBIN GLYCOSYLATED A1C: CPT

## 2024-01-27 PROCEDURE — 36415 COLL VENOUS BLD VENIPUNCTURE: CPT

## 2024-01-27 PROCEDURE — 82480 ASSAY SERUM CHOLINESTERASE: CPT

## 2024-01-30 LAB — CHOLINESTERASE SERPL-CCNC: 1917 IU/L (ref 1355–3299)

## 2024-01-31 ENCOUNTER — RA CDI HCC (OUTPATIENT)
Dept: OTHER | Facility: HOSPITAL | Age: 79
End: 2024-01-31

## 2024-02-06 ENCOUNTER — OFFICE VISIT (OUTPATIENT)
Dept: FAMILY MEDICINE CLINIC | Facility: CLINIC | Age: 79
End: 2024-02-06
Payer: COMMERCIAL

## 2024-02-06 VITALS
HEART RATE: 72 BPM | BODY MASS INDEX: 31.92 KG/M2 | WEIGHT: 203.4 LBS | HEIGHT: 67 IN | SYSTOLIC BLOOD PRESSURE: 130 MMHG | OXYGEN SATURATION: 97 % | RESPIRATION RATE: 16 BRPM | DIASTOLIC BLOOD PRESSURE: 74 MMHG

## 2024-02-06 DIAGNOSIS — F33.40 MDD (RECURRENT MAJOR DEPRESSIVE DISORDER) IN REMISSION (HCC): ICD-10-CM

## 2024-02-06 DIAGNOSIS — R73.01 IFG (IMPAIRED FASTING GLUCOSE): ICD-10-CM

## 2024-02-06 DIAGNOSIS — M19.90 ARTHRITIS: ICD-10-CM

## 2024-02-06 DIAGNOSIS — M94.0 COSTOCHONDRITIS: ICD-10-CM

## 2024-02-06 DIAGNOSIS — E67.3 HYPERVITAMINOSIS D: ICD-10-CM

## 2024-02-06 DIAGNOSIS — M79.7 FIBROMYALGIA: ICD-10-CM

## 2024-02-06 DIAGNOSIS — G89.29 CHRONIC BILATERAL THORACIC BACK PAIN: ICD-10-CM

## 2024-02-06 DIAGNOSIS — E03.9 ACQUIRED HYPOTHYROIDISM: ICD-10-CM

## 2024-02-06 DIAGNOSIS — M54.6 CHRONIC BILATERAL THORACIC BACK PAIN: ICD-10-CM

## 2024-02-06 DIAGNOSIS — E78.00 HYPERCHOLESTEROLEMIA: Primary | ICD-10-CM

## 2024-02-06 PROCEDURE — 99215 OFFICE O/P EST HI 40 MIN: CPT | Performed by: FAMILY MEDICINE

## 2024-02-06 NOTE — PATIENT INSTRUCTIONS
Triple coated peppermint can help with irritable bowel symptoms or functional abdominal pain. IBgard is one brand to try. It has to be triple coated to work for this.

## 2024-02-06 NOTE — PROGRESS NOTES
1. Hypercholesterolemia  Assessment & Plan:  Lab Results   Component Value Date    CHOLESTEROL 214 (H) 01/27/2024    CHOLESTEROL 202 (H) 12/16/2020    CHOLESTEROL 205 (H) 01/09/2020     Lab Results   Component Value Date    HDL 66 01/27/2024    HDL 79 12/16/2020    HDL 74 01/09/2020     Lab Results   Component Value Date    TRIG 62 01/27/2024    TRIG 32 12/16/2020    TRIG 44 01/09/2020     Lab Results   Component Value Date    NONHDLC 148 01/27/2024    NONHDLC 123 12/16/2020    NONHDLC 131 01/09/2020     Lab Results   Component Value Date    LDLCALC 136 (H) 01/27/2024 Jan 2024 Cholesterol from 202 up to 214, triglycerides from 32 up to 62, HDL from 79 down to 66, LDL from 117 To 136.  Continue working on diet and exercise. She does not want statins, but wants to know results.     Orders:  -     CBC and differential; Future; Expected date: 10/01/2024  -     Comprehensive metabolic panel; Future; Expected date: 10/01/2024  -     C-reactive protein; Future; Expected date: 10/01/2024  -     Hemoglobin A1C; Future; Expected date: 10/01/2024  -     Lipid panel; Future; Expected date: 10/01/2024  -     TSH, 3rd generation; Future; Expected date: 10/01/2024  -     T3, free; Future; Expected date: 10/01/2024  -     T4, free; Future; Expected date: 10/01/2024  -     Vitamin D 25 hydroxy; Future; Expected date: 10/01/2024    2. IFG (impaired fasting glucose)  Assessment & Plan:  HbA1C increased but fasting sugars well controlled on lab and at home. HbA1C 6.1. POCT HbA1C 5.7. reassured pt that mild pre-diabetes - 6.1 is likely lab error (seen in other patients also). Repeat with next labs.   Lab Results   Component Value Date    HGBA1C 6.1 (H) 01/27/2024       Orders:  -     Accu-Chek Softclix Lancets lancets; Use daily Testing once daily dx R73.01  -     glucose blood test strip; Use 1 each daily Testing once daily Accu Check Lashae Plus ALYCE BLOUNT test strips dx R73.01  -     CBC and differential; Future; Expected date:  10/01/2024  -     Comprehensive metabolic panel; Future; Expected date: 10/01/2024  -     C-reactive protein; Future; Expected date: 10/01/2024  -     Hemoglobin A1C; Future; Expected date: 10/01/2024  -     Lipid panel; Future; Expected date: 10/01/2024  -     TSH, 3rd generation; Future; Expected date: 10/01/2024  -     T3, free; Future; Expected date: 10/01/2024  -     T4, free; Future; Expected date: 10/01/2024  -     Vitamin D 25 hydroxy; Future; Expected date: 10/01/2024    3. Acquired hypothyroidism  Assessment & Plan:  Well controlled. Explained to pt that TSH is used to monitor meds. She again states she felt better when on the Cytomel. No change in dose of synthroid based on labs.     Orders:  -     CBC and differential; Future; Expected date: 10/01/2024  -     Comprehensive metabolic panel; Future; Expected date: 10/01/2024  -     C-reactive protein; Future; Expected date: 10/01/2024  -     Hemoglobin A1C; Future; Expected date: 10/01/2024  -     Lipid panel; Future; Expected date: 10/01/2024  -     TSH, 3rd generation; Future; Expected date: 10/01/2024  -     T3, free; Future; Expected date: 10/01/2024  -     T4, free; Future; Expected date: 10/01/2024  -     Vitamin D 25 hydroxy; Future; Expected date: 10/01/2024    4. Hypervitaminosis D  -     CBC and differential; Future; Expected date: 10/01/2024  -     Comprehensive metabolic panel; Future; Expected date: 10/01/2024  -     C-reactive protein; Future; Expected date: 10/01/2024  -     Hemoglobin A1C; Future; Expected date: 10/01/2024  -     Lipid panel; Future; Expected date: 10/01/2024  -     TSH, 3rd generation; Future; Expected date: 10/01/2024  -     T3, free; Future; Expected date: 10/01/2024  -     T4, free; Future; Expected date: 10/01/2024  -     Vitamin D 25 hydroxy; Future; Expected date: 10/01/2024    5. MDD (recurrent major depressive disorder) in remission ([facility])  Assessment & Plan:  Pt has not had symptoms in some time. Will move to  history.     Orders:  -     CBC and differential; Future; Expected date: 10/01/2024  -     Comprehensive metabolic panel; Future; Expected date: 10/01/2024  -     C-reactive protein; Future; Expected date: 10/01/2024  -     Hemoglobin A1C; Future; Expected date: 10/01/2024  -     Lipid panel; Future; Expected date: 10/01/2024  -     TSH, 3rd generation; Future; Expected date: 10/01/2024  -     T3, free; Future; Expected date: 10/01/2024  -     T4, free; Future; Expected date: 10/01/2024  -     Vitamin D 25 hydroxy; Future; Expected date: 10/01/2024    6. Chronic bilateral thoracic back pain  Assessment & Plan:  See above     Orders:  -     CBC and differential; Future; Expected date: 10/01/2024  -     Comprehensive metabolic panel; Future; Expected date: 10/01/2024  -     C-reactive protein; Future; Expected date: 10/01/2024  -     Hemoglobin A1C; Future; Expected date: 10/01/2024  -     Lipid panel; Future; Expected date: 10/01/2024  -     TSH, 3rd generation; Future; Expected date: 10/01/2024  -     T3, free; Future; Expected date: 10/01/2024  -     T4, free; Future; Expected date: 10/01/2024  -     Vitamin D 25 hydroxy; Future; Expected date: 10/01/2024    7. Arthritis  Assessment & Plan:  Working on exercise and with chiropractor.     Orders:  -     CBC and differential; Future; Expected date: 10/01/2024  -     Comprehensive metabolic panel; Future; Expected date: 10/01/2024  -     C-reactive protein; Future; Expected date: 10/01/2024  -     Hemoglobin A1C; Future; Expected date: 10/01/2024  -     Lipid panel; Future; Expected date: 10/01/2024  -     TSH, 3rd generation; Future; Expected date: 10/01/2024  -     T3, free; Future; Expected date: 10/01/2024  -     T4, free; Future; Expected date: 10/01/2024  -     Vitamin D 25 hydroxy; Future; Expected date: 10/01/2024    8. Fibromyalgia  Assessment & Plan:  Staying active to help.     Orders:  -     CBC and differential; Future; Expected date: 10/01/2024  -      Comprehensive metabolic panel; Future; Expected date: 10/01/2024  -     C-reactive protein; Future; Expected date: 10/01/2024  -     Hemoglobin A1C; Future; Expected date: 10/01/2024  -     Lipid panel; Future; Expected date: 10/01/2024  -     TSH, 3rd generation; Future; Expected date: 10/01/2024  -     T3, free; Future; Expected date: 10/01/2024  -     T4, free; Future; Expected date: 10/01/2024  -     Vitamin D 25 hydroxy; Future; Expected date: 10/01/2024    9. Costochondritis  Assessment & Plan:  Feels latest treatments have been very helpful with chiropractor.     Orders:  -     CBC and differential; Future; Expected date: 10/01/2024  -     Comprehensive metabolic panel; Future; Expected date: 10/01/2024  -     C-reactive protein; Future; Expected date: 10/01/2024  -     Hemoglobin A1C; Future; Expected date: 10/01/2024  -     Lipid panel; Future; Expected date: 10/01/2024  -     TSH, 3rd generation; Future; Expected date: 10/01/2024  -     T3, free; Future; Expected date: 10/01/2024  -     T4, free; Future; Expected date: 10/01/2024  -     Vitamin D 25 hydroxy; Future; Expected date: 10/01/2024     5 minutes spent on chart prep, 40 minutes spent with patient counseling/educating on their diagnoses, tests completed and any new tests ordered, any referrals placed, treatment options, and documentation of above today.   In prescribing new medications, or changing doses, we reviewed the risks and benefits and side effects of these medications along with other treatment options if appropriate.       Return in about 8 months (around 10/6/2024) for Medicare Wellness Visit.    Subjective:   Mariah is a 78 y.o. female here today for a follow-up on her current medical conditions:    Patient Active Problem List   Diagnosis    Arthritis    Acquired hypothyroidism    Fibromyalgia    Costochondritis    Chronic bilateral thoracic back pain    IFG (impaired fasting glucose)    Irritable bowel syndrome with both constipation and  diarrhea    History of basal cell carcinoma (BCC) of skin    Class 1 obesity due to excess calories with serious comorbidity and body mass index (BMI) of 31.0 to 31.9 in adult    Fibrocystic breast disease    No vaccination-pt refuse    Counseling regarding advanced directives    Lipoma of back    Anxiety    Hypercholesterolemia    MDD (recurrent major depressive disorder) in remission (HCC)    Seasonal allergic rhinitis due to pollen    Asymptomatic varicose veins of both lower extremities       Patient Care Team:  Beti Ho DO as PCP - General (Family Medicine)  Harriett Shahid MD    Current Medications:  Current Outpatient Medications   Medication Sig Dispense Refill    Accu-Chek Softclix Lancets lancets Use daily Testing once daily dx R73.01 100 each 3    Cholecalciferol (Vitamin D3) 10 MCG/ML LIQD Take 5,000 Units by mouth daily      COLLAGEN PO Take by mouth With pre and probiotic powder in drink      Cyanocobalamin (Vitamin B-12) 3000 MCG SUBL Place under the tongue      glucose blood test strip Use 1 each daily Testing once daily Accu Check Lashae Plus ALYCE BLOUNT test strips dx R73.01 100 each 3    Multiple Vitamins-Minerals (MULTIVITAMIN ADULT PO) Take by mouth      NON FORMULARY daily nattokinase      NON FORMULARY daily Belluca      Synthroid 100 MCG tablet Take 1 tablet (100 mcg total) by mouth daily BRAND NECESSARY 30 tablet 5     No current facility-administered medications for this visit.       HPI:  Chief Complaint   Patient presents with    Follow-up     -- Above per clinical staff and reviewed. --    PHQ-2/9 Depression Screening    Little interest or pleasure in doing things: 0 - not at all  Feeling down, depressed, or hopeless: 0 - not at all  Trouble falling or staying asleep, or sleeping too much: 0 - not at all  Feeling tired or having little energy: 0 - not at all  Poor appetite or overeatin - not at all  Feeling bad about yourself - or that you are a failure or have let yourself or your  family down: 0 - not at all  Trouble concentrating on things, such as reading the newspaper or watching television: 0 - not at all  Moving or speaking so slowly that other people could have noticed. Or the opposite - being so fidgety or restless that you have been moving around a lot more than usual: 0 - not at all  Thoughts that you would be better off dead, or of hurting yourself in some way: 0 - not at all  PHQ-9 Score: 0  PHQ-9 Interpretation: No or Minimal depression       SHERYL-7 Flowsheet Screening      Flowsheet Row Most Recent Value   Over the last 2 weeks, how often have you been bothered by any of the following problems?    Feeling nervous, anxious, or on edge 0   Not being able to stop or control worrying 0   Worrying too much about different things 0   Trouble relaxing 0   Being so restless that it is hard to sit still 0   Becoming easily annoyed or irritable 0   Feeling afraid as if something awful might happen 0   SHERYL-7 Total Score 0             due for lipids, cmp, TSH, HbA1C   [facility] 10.5.24 Jan 2024 Cholesterol from 202 up to 214, triglycerides from 32 up to 62, HDL from 79 down to 66, LDL from 117 To 136.  CRP from 6.6 down to 5.2, vitamin D from 77 down to 48,homecystein 13.1, TSH 2.930, CMP   normal. fbs 94. GFR 70.   January 2024 blood work hemoglobin A1c from 5.6 up to 6.1,  1/27/22 - TSH 0.055, calcium 1.21, free T4 1/15, free T3 2.77, prealbumin 20.4   Jul.y labs in chart from Dr. Montano - fbs 102, Ojse calcium 10.2  last visit referred to   PT. Miralax recommended. mood reviewed - was thinking of a supplement for mood. consider Cymbalta. 20 mg. ortho right ankle OA - PT ice orthics   Today:  Here with her    Veins in legs   6000 in the winter   Sugar never above 100   Cmp nonfasting 104   Cut back on sugar   Dami Buitrago chiropractor helping branden    The following portions of the patient's history were reviewed and updated as appropriate: allergies, current medications, past family  "history, past medical history, past social history, past surgical history and problem list.    Objective:  Vitals:  /74 (BP Location: Left arm, Patient Position: Sitting, Cuff Size: Large)   Pulse 72   Resp 16   Ht 5' 7\" (1.702 m)   Wt 92.3 kg (203 lb 6.4 oz)   SpO2 97%   BMI 31.86 kg/m²    Wt Readings from Last 3 Encounters:   02/06/24 92.3 kg (203 lb 6.4 oz)   01/18/24 92.2 kg (203 lb 3.2 oz)   01/10/24 89.8 kg (198 lb)      BP Readings from Last 3 Encounters:   02/06/24 130/74   01/18/24 122/80   01/10/24 124/61        Review of Systems   She has no other concerns. No unexpected weight changes. No chest pain, SOB, or palpitations. No GERD. No changes in bowels or bladder. Sleeping well. No mood changes.     Physical Exam   Constitutional:  she appears well-developed and well-nourished.  HENT: Head: Normocephalic.   Neck: Neck supple.   Cardiovascular: Normal rate, regular rhythm and normal heart sounds.   Pulmonary/Chest: Effort normal and breath sounds normal. No wheezes, rales, or rhonchi.   Abdominal: Soft. Bowel sounds are normal. There is no tenderness. No hepatosplenomegaly.   Musculoskeletal: she exhibits no edema. Bilateral posterior calves with some varicosities. No inflammation or pain.   Lymphadenopathy: she has no cervical adenopathy.   Neurological: she is alert and oriented to person, place, and time.   Skin: Skin is warm and dry.   Psychiatric: she has a normal mood and affect. her behavior is normal. Thought content normal.        "

## 2024-02-08 DIAGNOSIS — R73.01 IFG (IMPAIRED FASTING GLUCOSE): ICD-10-CM

## 2024-02-09 NOTE — TELEPHONE ENCOUNTER
She is not on Lantus. Test strips may not be covered because she is not diabetic. The prescription needs the times per day she is testing and dx code in the sig.

## 2024-02-10 PROBLEM — I83.93 ASYMPTOMATIC VARICOSE VEINS OF BOTH LOWER EXTREMITIES: Status: ACTIVE | Noted: 2024-02-10

## 2024-02-10 PROBLEM — F33.40 MDD (RECURRENT MAJOR DEPRESSIVE DISORDER) IN REMISSION (HCC): Status: ACTIVE | Noted: 2023-10-05

## 2024-02-10 RX ORDER — LANCETS
EACH MISCELLANEOUS DAILY
Qty: 100 EACH | Refills: 3 | Status: SHIPPED | OUTPATIENT
Start: 2024-02-10 | End: 2024-02-13 | Stop reason: SDUPTHER

## 2024-02-10 NOTE — ASSESSMENT & PLAN NOTE
Well controlled. Explained to pt that TSH is used to monitor meds. She again states she felt better when on the Cytomel. No change in dose of synthroid based on labs.

## 2024-02-10 NOTE — ASSESSMENT & PLAN NOTE
Lab Results   Component Value Date    CHOLESTEROL 214 (H) 01/27/2024    CHOLESTEROL 202 (H) 12/16/2020    CHOLESTEROL 205 (H) 01/09/2020     Lab Results   Component Value Date    HDL 66 01/27/2024    HDL 79 12/16/2020    HDL 74 01/09/2020     Lab Results   Component Value Date    TRIG 62 01/27/2024    TRIG 32 12/16/2020    TRIG 44 01/09/2020     Lab Results   Component Value Date    NONHDLC 148 01/27/2024    NONHDLC 123 12/16/2020    NONHDLC 131 01/09/2020     Lab Results   Component Value Date    LDLCALC 136 (H) 01/27/2024 Jan 2024 Cholesterol from 202 up to 214, triglycerides from 32 up to 62, HDL from 79 down to 66, LDL from 117 To 136.  Continue working on diet and exercise. She does not want statins, but wants to know results.

## 2024-02-10 NOTE — ASSESSMENT & PLAN NOTE
HbA1C increased but fasting sugars well controlled on lab and at home. HbA1C 6.1. POCT HbA1C 5.7. reassured pt that mild pre-diabetes - 6.1 is likely lab error (seen in other patients also). Repeat with next labs.   Lab Results   Component Value Date    HGBA1C 6.1 (H) 01/27/2024

## 2024-02-13 RX ORDER — LANCETS
EACH MISCELLANEOUS DAILY
Qty: 100 EACH | Refills: 3 | Status: SHIPPED | OUTPATIENT
Start: 2024-02-13

## 2024-02-13 NOTE — TELEPHONE ENCOUNTER
Called patient to get clarification. Per patient, she needs the test strips and lancets refilled, not lantus. Patient states she checks her sugars once daily PRN.

## 2024-02-15 ENCOUNTER — PATIENT MESSAGE (OUTPATIENT)
Dept: FAMILY MEDICINE CLINIC | Facility: CLINIC | Age: 79
End: 2024-02-15

## 2024-02-15 DIAGNOSIS — E03.9 ACQUIRED HYPOTHYROIDISM: Primary | ICD-10-CM

## 2024-02-19 ENCOUNTER — TELEPHONE (OUTPATIENT)
Age: 79
End: 2024-02-19

## 2024-02-19 DIAGNOSIS — R73.01 IFG (IMPAIRED FASTING GLUCOSE): Primary | ICD-10-CM

## 2024-02-20 PROBLEM — Z28.21 NO VACCINATION-PT REFUSE: Status: RESOLVED | Noted: 2021-07-24 | Resolved: 2024-02-20

## 2024-02-20 PROBLEM — Z71.89 COUNSELING REGARDING ADVANCED DIRECTIVES: Status: RESOLVED | Noted: 2022-02-03 | Resolved: 2024-02-20

## 2024-02-20 NOTE — TELEPHONE ENCOUNTER
Spoke with patient and she said she has been getting them for years she thinks you normally order them under her .

## 2024-02-20 NOTE — TELEPHONE ENCOUNTER
I cannot order something for her under her 's insurance. If he has diabetes and is checking his sugars then we can order them for him to check once a day, but not for her.

## 2024-02-21 ENCOUNTER — APPOINTMENT (OUTPATIENT)
Dept: LAB | Facility: HOSPITAL | Age: 79
End: 2024-02-21
Payer: COMMERCIAL

## 2024-02-21 DIAGNOSIS — E03.9 ACQUIRED HYPOTHYROIDISM: ICD-10-CM

## 2024-02-21 LAB
T3FREE SERPL-MCNC: 3.54 PG/ML (ref 2.5–3.9)
T4 FREE SERPL-MCNC: 1.05 NG/DL (ref 0.61–1.12)
TSH SERPL DL<=0.05 MIU/L-ACNC: 0.29 UIU/ML (ref 0.45–4.5)

## 2024-02-21 PROCEDURE — 84439 ASSAY OF FREE THYROXINE: CPT

## 2024-02-21 PROCEDURE — 84481 FREE ASSAY (FT-3): CPT

## 2024-02-21 PROCEDURE — 84443 ASSAY THYROID STIM HORMONE: CPT

## 2024-02-21 PROCEDURE — 36415 COLL VENOUS BLD VENIPUNCTURE: CPT

## 2024-02-21 RX ORDER — LEVOTHYROXINE SODIUM 100 MCG
TABLET ORAL
Qty: 30 TABLET | Refills: 5 | Status: SHIPPED | OUTPATIENT
Start: 2024-02-21

## 2024-04-30 ENCOUNTER — PATIENT MESSAGE (OUTPATIENT)
Dept: FAMILY MEDICINE CLINIC | Facility: CLINIC | Age: 79
End: 2024-04-30

## 2024-05-02 ENCOUNTER — PATIENT MESSAGE (OUTPATIENT)
Dept: FAMILY MEDICINE CLINIC | Facility: CLINIC | Age: 79
End: 2024-05-02

## 2024-05-06 DIAGNOSIS — E03.9 ACQUIRED HYPOTHYROIDISM: ICD-10-CM

## 2024-05-06 RX ORDER — LEVOTHYROXINE SODIUM 100 MCG
TABLET ORAL
Qty: 30 TABLET | Refills: 5 | Status: SHIPPED | OUTPATIENT
Start: 2024-05-06

## 2024-05-23 ENCOUNTER — APPOINTMENT (OUTPATIENT)
Dept: LAB | Facility: HOSPITAL | Age: 79
End: 2024-05-23
Payer: COMMERCIAL

## 2024-05-23 DIAGNOSIS — E03.9 ACQUIRED HYPOTHYROIDISM: ICD-10-CM

## 2024-05-23 LAB
T3FREE SERPL-MCNC: 2.99 PG/ML (ref 2.5–3.9)
T4 FREE SERPL-MCNC: 0.81 NG/DL (ref 0.61–1.12)
TSH SERPL DL<=0.05 MIU/L-ACNC: 1.56 UIU/ML (ref 0.45–4.5)

## 2024-05-23 PROCEDURE — 84439 ASSAY OF FREE THYROXINE: CPT

## 2024-05-23 PROCEDURE — 84443 ASSAY THYROID STIM HORMONE: CPT

## 2024-05-23 PROCEDURE — 36415 COLL VENOUS BLD VENIPUNCTURE: CPT

## 2024-05-23 PROCEDURE — 84481 FREE ASSAY (FT-3): CPT

## 2024-07-03 ENCOUNTER — CONSULT (OUTPATIENT)
Dept: ENDOCRINOLOGY | Facility: CLINIC | Age: 79
End: 2024-07-03
Payer: COMMERCIAL

## 2024-07-03 VITALS
HEART RATE: 67 BPM | WEIGHT: 200 LBS | DIASTOLIC BLOOD PRESSURE: 80 MMHG | BODY MASS INDEX: 31.39 KG/M2 | OXYGEN SATURATION: 98 % | HEIGHT: 67 IN | SYSTOLIC BLOOD PRESSURE: 116 MMHG

## 2024-07-03 DIAGNOSIS — E61.1 IRON DEFICIENCY: ICD-10-CM

## 2024-07-03 DIAGNOSIS — E53.8 VITAMIN B12 DEFICIENCY: Primary | ICD-10-CM

## 2024-07-03 DIAGNOSIS — R53.83 OTHER FATIGUE: ICD-10-CM

## 2024-07-03 DIAGNOSIS — E03.9 ACQUIRED HYPOTHYROIDISM: ICD-10-CM

## 2024-07-03 PROCEDURE — 99204 OFFICE O/P NEW MOD 45 MIN: CPT | Performed by: INTERNAL MEDICINE

## 2024-07-03 NOTE — PATIENT INSTRUCTIONS
- Please continue taking Synthroid 100 mcg 6 days a week for now  - Take this medication first thing in the morning on an empty stomach and wait at least 1 hour to take your supplements and 4 hours for multivitamins. You can take the B12 with Synthroid.  - I will repeat your levels again in 2 weeks and in the meantime if you have any symptoms you can get the lab work done sooner.  - Talk to your primary care provider about a sleep study in the future if you do notice any snoring at night or sleepiness during the day  - Please make sure you get adequate protein and carbohydrates with each meal. Increase your resistance training as well and drink plenty of water throughout the day to remain well-hydrated. If you would like to see a nutritionist, please let us know and I can place a referral.

## 2024-07-03 NOTE — PROGRESS NOTES
Mariah Mendiola  78 y.o. Female MRN: [id number]   Encounter: [id number]    New Patient Consult Note    CC: Hypothyroidism      Referring Provider:  Beti Ho,   [address]  [address]  [place] [address]      ASSESSMENT/PLAN  Assessment:  Mariah Mendiola is a 78 y.o. female with hypothyroidism.     Plan:        Hypothyroidism  - She can continue her current dose of Synthroid 100 mcg 6 days a week   - Repeat lab work in 2-3 weeks and if it remains stable can repeat again in 3-4 months  - Discussed appropriate administration of Synthroid and recommended she delay her OTC supplements for at least 1 hour to avoid impaired absorption of Synthroid  - Discussed Cytomel and natural dessicated thyroid along with their benefits and risks. Discussed that dessicated thyroid could contain inconsistent amounts of thyroid hormone which could result in wide fluctuations of thyroid function tests. Additionally, Cytomel has not been well-studied in this population and would not recommend this given her age and history of palpitations.   - Patient requested a Celiac panel to assess for underlying gluten sensitivity which can be discussed with her PCP.  At this time, do not suspect intolerance to her current Synthroid medication as her thyroid function tests have overall remained relatively stable.    2.  Weight gain  - Patient reports difficulty losing weight despite dieting in the past  - Recommend balancing all meals with consistent amounts of carbohydrates, fats, and protein and avoiding skipping meals. Recommend increasing water intake.  - Continue regular exercise and add some resistance training.  - Offered nutritional counseling with weight management RD for meal planning whih she will consider  - Will check vitamin B12 and iron panel to exclude underlying deficiencies as a cause of her fatigue and inability to lose weight  - Advised patient to consider sleep study in the future to exclude underlying sleep apnea  especially if she notices daytime somnolence      HISTORY OF PRESENT ILLNESS  HPI:   Mariah Mendiola is an 78 y.o. female with a past medical history significant for hypothyroidism with Hashimoto's thyroiditis, impaired fasting glucose, fibromyalgia, depression, anxiety, and obesity who presents for initial evaluation of her hypothyroidism. She is referred by her PCP, Dr. Ho. She is accompanied by her .    She was diagnosed with hypothyroidism at age 40-45 and initiated on Synthroid. She is currently taking Synthroid 100 mcg 6 days a week. She does take an OTC supplement with this and takes a collagen powder with her coffee a few hours later. She previously took Cytomel 12.5 mcg daily in 2022 for 6 months with successful weight loss. She endorses brittle nails, brain fog, lethargy, fatigue, heart rates in the 60s, inability to lose weight, occasional tremors, occasional palpitations, and sleep disturbances. She denies neck swelling. She endorses dysphagia but denies difficulty breathing or hoarseness of voice. She denies previous use of lithium, interferon, or amiodarone; iodine exposure; steroid use; or radiation exposure to the head, neck, or chest.     She has had difficulty with her weight for quite some time.     Highest weight: >200 lbs    Current weight: 199 lbs    Goal weight: 170 lbs    Previous interventions: Diets    Physical activity: Pickleball 2-3 timed /week, gym couple of times per week with weightbearing    JIMMY: Never tested      Diet:    B: Berries, coffee with collagen daily and caramel macchiato creamer   L: GF crackers (4) w/hummus or goat cheese    D: Protein with carb and side   S: GF vanessa crackers w/Ice drink, yogurt popsicle   Beverages: Water (20 oz daily)       Family history: Mother with Hashimoto's thyroiditis, father with type 2 diabetes mellitus  Tobacco: Former smoker quit 50 years ago  Alcohol: Glass of wine with dinner occasionally  Illicit drugs: Denies       Review of  "Systems   Constitutional:  Positive for fatigue and unexpected weight change. Negative for chills and fever.   HENT:  Negative for ear pain, sore throat, trouble swallowing and voice change.    Eyes:  Negative for pain and visual disturbance.   Respiratory:  Negative for cough and shortness of breath.    Cardiovascular:  Positive for palpitations. Negative for chest pain.   Gastrointestinal:  Negative for abdominal pain and vomiting.   Genitourinary:  Negative for dysuria and hematuria.   Musculoskeletal:  Negative for arthralgias and back pain.   Skin:  Negative for color change and rash.   Neurological:  Positive for tremors. Negative for seizures and syncope.   Psychiatric/Behavioral:  Positive for confusion and sleep disturbance.    All other systems reviewed and are negative.       Past Medical History:   Diagnosis Date    Allergic     Anesthesia     per pt after hip replacement surgery \"a narcotic made blood pressure drop-after surgery\"    Anxiety     \"Preop anxiety\"    Arthritis 02/25/2016    Basal cell carcinoma     Breast lump     \"56 yrs ago and benign\"    Cancer (HCC)     Chronic sinusitis     Costochondritis     Counseling regarding advanced directives     Medical POA daughter Ama Rogel, if there is hope recuscitate, if there is no hope DNR. She states she has talked to her and wants her daughter to make the decision. Follow Advanced Directive. She will bring in paperwork. (slw 10/5/2023) Wants  and daughter to do what they make them comfortable.        Dental bridge present     Dental crowns present     Disease of thyroid gland     Exercises three times per week     to the gym and pickle ball    Family history of pseudocholinesterase deficiency     per pt \"Daughter has very severe reaction -was on ventilator after a deviated septum surgery\"    Hiatal hernia     History of kidney stones     History of pneumonia     History of transfusion     \"received own blood for bilat hip replacement sx\" " "   No vaccination-pt refuse     Tinnitus     at times    Wears glasses         Past Surgical History:   Procedure Laterality Date    BREAST BIOPSY Left     BREAST CYST EXCISION Left     BREAST SURGERY      Puncture aspiration of a cyst    COLONOSCOPY      JOINT REPLACEMENT      both hips    MA EXCISION TUMOR SOFT TIS BACK/FLANK SUBQ 3 CM/> Right 1/10/2024    Procedure: EXCISION MASS RIGHT SIDE OF BACK;  Surgeon: Robert Bloch, MD;  Location:  MAIN OR;  Service: General    SKIN CANCER EXCISION      one eyelid    SKIN LESION EXCISION      Back, Lt Face    TONSILLECTOMY      TOTAL HIP ARTHROPLASTY Bilateral     Impression 16    WISDOM TOOTH EXTRACTION          Family History   Problem Relation Age of Onset    Diabetes Father     Hearing loss Father     Uterine cancer Family     Stroke Mother     Hearing loss Mother     Vision loss Mother     Thyroid disease Mother     Thyroid disease unspecified Mother     Hypothyroidism Mother     No Known Problems Brother     No Known Problems Daughter     Endometrial cancer Maternal Grandmother     Heart disease Neg Hx         Social History     Substance and Sexual Activity   Alcohol Use Yes    Comment: every now and they     Social History     Tobacco Use   Smoking Status Former    Current packs/day: 0.00    Average packs/day: 1 pack/day for 6.0 years (6.0 ttl pk-yrs)    Types: Cigarettes    Start date: 1960    Quit date: 1965    Years since quittin.5   Smokeless Tobacco Never   Tobacco Comments    8 years smoking per [place]           OBJECTIVE  Visit Vitals  /80 (BP Location: Left arm, Patient Position: Sitting, Cuff Size: Large)   Pulse 67   Ht 5' 7\" (1.702 m)   Wt 90.7 kg (200 lb)   SpO2 98%   BMI 31.32 kg/m²   OB Status Postmenopausal   Smoking Status Former   BSA 2.02 m²       Physical Exam  Constitutional:       General: She is not in acute distress.     Appearance: Normal appearance.   HENT:      Head: Normocephalic and atraumatic.   Eyes:      " Extraocular Movements: Extraocular movements intact.      Pupils: Pupils are equal, round, and reactive to light.   Neck:      Thyroid: No thyromegaly or thyroid tenderness.   Cardiovascular:      Rate and Rhythm: Normal rate and regular rhythm.   Pulmonary:      Effort: Pulmonary effort is normal.      Breath sounds: Normal breath sounds.   Abdominal:      General: Bowel sounds are normal. There is no distension.      Palpations: Abdomen is soft.      Tenderness: There is no abdominal tenderness.   Musculoskeletal:         General: No swelling.      Cervical back: Normal range of motion and neck supple.      Right lower leg: No edema.      Left lower leg: No edema.   Skin:     General: Skin is warm and dry.   Neurological:      General: No focal deficit present.      Mental Status: She is alert and oriented to person, place, and time.      Deep Tendon Reflexes: Reflexes are normal and symmetric.   Psychiatric:         Mood and Affect: Mood normal.         Behavior: Behavior normal.           Labs:   Latest Reference Range & Units 01/27/24 08:24 02/21/24 11:00 05/23/24 08:10   TSH 3RD GENERATON 0.450 - 4.500 uIU/mL 2.930 0.286 (L) 1.564   FREE T4 0.61 - 1.12 ng/dL  1.05 0.81   FREE T3 2.50 - 3.90 pg/mL  3.54 2.99   (L): Data is abnormally low       Imaging:  No orders to display        Discussed with the patient and all questioned fully answered. She will call me if any problems arise.    Counseled patient on diagnostic results, prognosis, risk and benefit of treatment options, instruction for management, importance of treatment compliance, risk factor reduction, and impressions.

## 2024-07-09 ENCOUNTER — TELEPHONE (OUTPATIENT)
Age: 79
End: 2024-07-09

## 2024-07-09 NOTE — TELEPHONE ENCOUNTER
Patient calling in regards to labs that were ordered.     States she does not want to get the T4 and the TSH, 3rd generation done as she just had them don on 5/23.     I advised her that she can request to the lab when she goes that they only do the iron panel and the Vitamin B12.     She asked that we put an expected date on those two labs for when she is actually due for them so that the lab does not pull them anyway.     Please advise.

## 2024-07-09 NOTE — TELEPHONE ENCOUNTER
I called and spoke with the patient she stated that she only wants to do her Iron and Vitamin B12 right now and she stated that her T4 and the TSH, 3rd generation be done later on and would like for them to be to 2 different orders. Please review thank you.

## 2024-07-09 NOTE — TELEPHONE ENCOUNTER
Plan was to repeat thyroid function tests in approximately 3 weeks, the B12 level and iron level can be checked at the same time.

## 2024-07-17 ENCOUNTER — TELEPHONE (OUTPATIENT)
Age: 79
End: 2024-07-17

## 2024-07-17 DIAGNOSIS — E53.8 B12 DEFICIENCY: ICD-10-CM

## 2024-07-17 DIAGNOSIS — E03.9 ACQUIRED HYPOTHYROIDISM: ICD-10-CM

## 2024-07-17 DIAGNOSIS — E67.3 HYPERVITAMINOSIS D: Primary | ICD-10-CM

## 2024-07-17 NOTE — TELEPHONE ENCOUNTER
Pt called to say she does not understand why routine blood work is being order more that once a year. She will get labs done if Dr Ho wants her to as long as her insurance covers it. She did see Dr Santana on 7/3/24 and was not happy with her. She will not be going back to see her. Dr Santana did order labs, TSH, T4, Vit B12 and iron panel. The pt would like the Vit B12 and iron panel to be ordered by Dr Ho. She would also like an iodine test ordered. Dr Santana told her there is gluten in synthroid. The pt states she is allergic to gluten. Can she be tested for gluten. Please advise about the gluten in synthroid and if adding these additional labs to her AW[facility] labs/appt will be covered.

## 2024-07-18 NOTE — TELEPHONE ENCOUNTER
Please advise the patient that she is not due for any blood work. I do not order blood work unless it is deemed necessary - if the results would change the plan. There is no blood work needed for her at this time. Synthroid does not contain gluten. We cannot test her for celiac disease if she is following a gluten free diet. If she is following a gluten free diet then it does not matter if she has celiac or gluten insensitivity - the treatment for her is the same.  I can order the b12, vit D and thyroid blood work she requested, but she again needs to be aware she may get a bill for these. If she is not going to see endocrine then she needs to take the meds I recommend. If she is not in agreement with this she can see a homeopathic provider as we have discussed in the past. I would recommend [facility].

## 2024-09-07 ENCOUNTER — APPOINTMENT (OUTPATIENT)
Dept: LAB | Facility: HOSPITAL | Age: 79
End: 2024-09-07
Payer: COMMERCIAL

## 2024-09-07 DIAGNOSIS — E53.8 B12 DEFICIENCY: ICD-10-CM

## 2024-09-07 DIAGNOSIS — R53.83 OTHER FATIGUE: ICD-10-CM

## 2024-09-07 DIAGNOSIS — E03.9 ACQUIRED HYPOTHYROIDISM: ICD-10-CM

## 2024-09-07 DIAGNOSIS — E61.1 IRON DEFICIENCY: ICD-10-CM

## 2024-09-07 DIAGNOSIS — E67.3 HYPERVITAMINOSIS D: ICD-10-CM

## 2024-09-07 LAB
25(OH)D3 SERPL-MCNC: 44.6 NG/ML (ref 30–100)
FERRITIN SERPL-MCNC: 85 NG/ML (ref 11–307)
IRON SATN MFR SERPL: 39 % (ref 15–50)
IRON SERPL-MCNC: 132 UG/DL (ref 50–212)
T4 FREE SERPL-MCNC: 1.08 NG/DL (ref 0.61–1.12)
TIBC SERPL-MCNC: 342 UG/DL (ref 250–450)
TSH SERPL DL<=0.05 MIU/L-ACNC: 1.51 UIU/ML (ref 0.45–4.5)
UIBC SERPL-MCNC: 210 UG/DL (ref 155–355)
VIT B12 SERPL-MCNC: 2213 PG/ML (ref 180–914)

## 2024-09-07 PROCEDURE — 83540 ASSAY OF IRON: CPT

## 2024-09-07 PROCEDURE — 84439 ASSAY OF FREE THYROXINE: CPT

## 2024-09-07 PROCEDURE — 82728 ASSAY OF FERRITIN: CPT

## 2024-09-07 PROCEDURE — 83550 IRON BINDING TEST: CPT

## 2024-09-07 PROCEDURE — 84443 ASSAY THYROID STIM HORMONE: CPT

## 2024-09-07 PROCEDURE — 82607 VITAMIN B-12: CPT

## 2024-09-07 PROCEDURE — 82306 VITAMIN D 25 HYDROXY: CPT

## 2024-09-07 PROCEDURE — 36415 COLL VENOUS BLD VENIPUNCTURE: CPT

## 2024-09-15 ENCOUNTER — HOSPITAL ENCOUNTER (EMERGENCY)
Facility: HOSPITAL | Age: 79
Discharge: HOME | End: 2024-09-16
Attending: EMERGENCY MEDICINE
Payer: COMMERCIAL

## 2024-09-15 DIAGNOSIS — N30.91 HEMORRHAGIC CYSTITIS: Primary | ICD-10-CM

## 2024-09-15 PROCEDURE — 99284 EMERGENCY DEPT VISIT MOD MDM: CPT | Performed by: EMERGENCY MEDICINE

## 2024-09-15 PROCEDURE — 99283 EMERGENCY DEPT VISIT LOW MDM: CPT

## 2024-09-16 VITALS
HEART RATE: 76 BPM | RESPIRATION RATE: 20 BRPM | SYSTOLIC BLOOD PRESSURE: 164 MMHG | DIASTOLIC BLOOD PRESSURE: 68 MMHG | OXYGEN SATURATION: 96 % | TEMPERATURE: 98.5 F

## 2024-09-16 LAB
ALBUMIN SERPL BCG-MCNC: 3.9 G/DL (ref 3.5–5)
ALP SERPL-CCNC: 68 U/L (ref 34–104)
ALT SERPL W P-5'-P-CCNC: 12 U/L (ref 7–52)
ANION GAP SERPL CALCULATED.3IONS-SCNC: 9 MMOL/L (ref 4–13)
AST SERPL W P-5'-P-CCNC: 20 U/L (ref 13–39)
BACTERIA UR QL AUTO: ABNORMAL /HPF
BASOPHILS # BLD AUTO: 0.08 THOUSANDS/ΜL (ref 0–0.1)
BASOPHILS NFR BLD AUTO: 1 % (ref 0–1)
BILIRUB SERPL-MCNC: 0.46 MG/DL (ref 0.2–1)
BILIRUB UR QL STRIP: NEGATIVE
BUN SERPL-MCNC: 12 MG/DL (ref 5–25)
CALCIUM SERPL-MCNC: 9.2 MG/DL (ref 8.4–10.2)
CHLORIDE SERPL-SCNC: 100 MMOL/L (ref 96–108)
CLARITY UR: ABNORMAL
CO2 SERPL-SCNC: 27 MMOL/L (ref 21–32)
COLOR UR: ABNORMAL
CREAT SERPL-MCNC: 0.76 MG/DL (ref 0.6–1.3)
EOSINOPHIL # BLD AUTO: 0.42 THOUSAND/ΜL (ref 0–0.61)
EOSINOPHIL NFR BLD AUTO: 3 % (ref 0–6)
ERYTHROCYTE [DISTWIDTH] IN BLOOD BY AUTOMATED COUNT: 13.1 % (ref 11.6–15.1)
GFR SERPL CREATININE-BSD FRML MDRD: 73 ML/MIN/1.73SQ M
GLUCOSE SERPL-MCNC: 113 MG/DL (ref 65–140)
GLUCOSE UR STRIP-MCNC: NEGATIVE MG/DL
HCT VFR BLD AUTO: 38.8 % (ref 34.8–46.1)
HGB BLD-MCNC: 12.9 G/DL (ref 11.5–15.4)
HGB UR QL STRIP.AUTO: ABNORMAL
IMM GRANULOCYTES # BLD AUTO: 0.05 THOUSAND/UL (ref 0–0.2)
IMM GRANULOCYTES NFR BLD AUTO: 0 % (ref 0–2)
KETONES UR STRIP-MCNC: ABNORMAL MG/DL
LEUKOCYTE ESTERASE UR QL STRIP: ABNORMAL
LYMPHOCYTES # BLD AUTO: 2.51 THOUSANDS/ΜL (ref 0.6–4.47)
LYMPHOCYTES NFR BLD AUTO: 20 % (ref 14–44)
MCH RBC QN AUTO: 30.8 PG (ref 26.8–34.3)
MCHC RBC AUTO-ENTMCNC: 33.2 G/DL (ref 31.4–37.4)
MCV RBC AUTO: 93 FL (ref 82–98)
MONOCYTES # BLD AUTO: 0.85 THOUSAND/ΜL (ref 0.17–1.22)
MONOCYTES NFR BLD AUTO: 7 % (ref 4–12)
NEUTROPHILS # BLD AUTO: 8.99 THOUSANDS/ΜL (ref 1.85–7.62)
NEUTS SEG NFR BLD AUTO: 69 % (ref 43–75)
NITRITE UR QL STRIP: POSITIVE
NON-SQ EPI CELLS URNS QL MICRO: ABNORMAL /HPF
NRBC BLD AUTO-RTO: 0 /100 WBCS
PH UR STRIP.AUTO: 7 [PH]
PLATELET # BLD AUTO: 258 THOUSANDS/UL (ref 149–390)
PMV BLD AUTO: 10.2 FL (ref 8.9–12.7)
POTASSIUM SERPL-SCNC: 3.6 MMOL/L (ref 3.5–5.3)
PROT SERPL-MCNC: 6.8 G/DL (ref 6.4–8.4)
PROT UR STRIP-MCNC: ABNORMAL MG/DL
RBC # BLD AUTO: 4.19 MILLION/UL (ref 3.81–5.12)
RBC #/AREA URNS AUTO: ABNORMAL /HPF
SODIUM SERPL-SCNC: 136 MMOL/L (ref 135–147)
SP GR UR STRIP.AUTO: 1.02 (ref 1–1.03)
UROBILINOGEN UR QL STRIP.AUTO: 2 E.U./DL
WBC # BLD AUTO: 12.9 THOUSAND/UL (ref 4.31–10.16)
WBC #/AREA URNS AUTO: ABNORMAL /HPF

## 2024-09-16 PROCEDURE — 87086 URINE CULTURE/COLONY COUNT: CPT | Performed by: EMERGENCY MEDICINE

## 2024-09-16 PROCEDURE — 80053 COMPREHEN METABOLIC PANEL: CPT | Performed by: EMERGENCY MEDICINE

## 2024-09-16 PROCEDURE — 87186 SC STD MICRODIL/AGAR DIL: CPT | Performed by: EMERGENCY MEDICINE

## 2024-09-16 PROCEDURE — 87077 CULTURE AEROBIC IDENTIFY: CPT | Performed by: EMERGENCY MEDICINE

## 2024-09-16 PROCEDURE — 85025 COMPLETE CBC W/AUTO DIFF WBC: CPT | Performed by: EMERGENCY MEDICINE

## 2024-09-16 PROCEDURE — 36415 COLL VENOUS BLD VENIPUNCTURE: CPT | Performed by: EMERGENCY MEDICINE

## 2024-09-16 PROCEDURE — 81001 URINALYSIS AUTO W/SCOPE: CPT | Performed by: EMERGENCY MEDICINE

## 2024-09-16 RX ORDER — CEFPODOXIME PROXETIL 200 MG/1
200 TABLET, FILM COATED ORAL ONCE
Status: COMPLETED | OUTPATIENT
Start: 2024-09-16 | End: 2024-09-16

## 2024-09-16 RX ORDER — CEFPODOXIME PROXETIL 200 MG/1
200 TABLET, FILM COATED ORAL 2 TIMES DAILY
Qty: 19 TABLET | Refills: 0 | Status: SHIPPED | OUTPATIENT
Start: 2024-09-16 | End: 2024-09-26

## 2024-09-16 RX ORDER — PHENAZOPYRIDINE HYDROCHLORIDE 200 MG/1
200 TABLET, FILM COATED ORAL 3 TIMES DAILY
Qty: 6 TABLET | Refills: 0 | Status: SHIPPED | OUTPATIENT
Start: 2024-09-16

## 2024-09-16 RX ORDER — ONDANSETRON 4 MG/1
4 TABLET, ORALLY DISINTEGRATING ORAL ONCE
Status: COMPLETED | OUTPATIENT
Start: 2024-09-16 | End: 2024-09-16

## 2024-09-16 RX ORDER — ONDANSETRON 4 MG/1
4 TABLET, ORALLY DISINTEGRATING ORAL EVERY 6 HOURS PRN
Qty: 20 TABLET | Refills: 0 | Status: SHIPPED | OUTPATIENT
Start: 2024-09-16

## 2024-09-16 RX ADMIN — ONDANSETRON 4 MG: 4 TABLET, ORALLY DISINTEGRATING ORAL at 01:11

## 2024-09-16 RX ADMIN — CEFPODOXIME PROXETIL 200 MG: 200 TABLET, FILM COATED ORAL at 01:17

## 2024-09-16 NOTE — ED PROVIDER NOTES
1. Hemorrhagic cystitis      ED Disposition       ED Disposition   Discharge    Condition   Stable    Date/Time   Mon Sep 16, 2024 12:44 AM    Comment   June D Maury discharge to home/self care.                   Assessment & Plan       Medical Decision Making  80 y/o female with hx of IBS presents to the ED for evaluation of hematuria, dysuria, and increased urinary frequency that started earlier tonight.  The patient states that this afternoon she had noticed increased urinary frequency and some slight burning, however approximately 6 hours ago she started having more dysuria and then also had blood in her urine.  She also reports occasional suprapubic discomfort, however this is not consistent.  She also reports a prior history of 1 prior kidney stone many years ago, however she has not had any flank pain with her current symptoms and feels that her current symptoms are different from her prior kidney stone.  She denies any fever, chills, cough, dyspnea, chest pain, upper abdominal pain, flank pain, nausea, vomiting, diarrhea, vaginal bleeding, or vaginal discharge.    Vital signs reviewed.  See physical exam documentation for exam findings.  Differential diagnosis includes but is not limited to UTI/hemorrhagic cystitis, renal dysfunction, anemia, and/or electrolyte disturbance.  Less likely kidney stone in the absence of flank pain, however cannot completely exclude without imaging.  I discussed with the patient regarding obtaining labs, UA, and CT abdomen/pelvis.  The patient declined CT imaging at this time as she feels that her symptoms are not related to a kidney stone and at this time would prefer lab work and UA only.  Orders placed.  See ED course for independent interpretation of results.  Urine appears consistent with hemorrhagic cystitis/UTI.  The patient still declines CT imaging but states that if her symptoms worsen she would return to the ER for further evaluation and imaging at that time.  Will  treat with course of cefpodoxime and will also provide Zofran as the patient reports history of frequent nausea with oral antibiotic use. I discussed all findings, treatment, red flags/return precautions, and outpatient follow-up and the patient/family understand and agree. Stable for discharge.    Amount and/or Complexity of Data Reviewed  Labs: ordered. Decision-making details documented in ED Course.    Risk  Prescription drug management.                  ED Course as of 09/16/24 0055   Mon Sep 16, 2024   0029 CBC and differential(!)  WBC 12.9, slightly elevated.  Normal hemoglobin, hematocrit, and platelet count.   0030 Comprehensive metabolic panel  All WNL, normal renal function.   0030 UA w Reflex to Microscopic w Reflex to Culture(!)   0030 Urine Microscopic(!)   0030 UA reveals 2+ leukocytes and is positive for nitrites.  Also obvious hematuria visible with 3+ blood.  On microscopy feels is obscured with RBCs, as well as 20-30 WBC, occasional epithelial cells, and occasional bacteria       Medications   cefpodoxime (VANTIN) tablet 200 mg (has no administration in time range)   ondansetron (ZOFRAN-ODT) dispersible tablet 4 mg (has no administration in time range)     Chief Complaint   Patient presents with    Possible UTI     Patient stating burning, frequency this afternnon. Noted hematuria around 6 or 7pm. Denies flank pain, abd pain/cp/sob/fevers.          History of Present Illness       80 y/o female with hx of IBS presents to the ED for evaluation of hematuria, dysuria, and increased urinary frequency that started earlier tonight.  The patient states that this afternoon she had noticed increased urinary frequency and some slight burning, however approximately 6 hours ago she started having more dysuria and then also had blood in her urine.  She also reports occasional suprapubic discomfort, however this is not consistent.  She also reports a prior history of 1 prior kidney stone many years ago, however  she has not had any flank pain with her current symptoms and feels that her current symptoms are different from her prior kidney stone.  She denies any fever, chills, cough, dyspnea, chest pain, upper abdominal pain, flank pain, nausea, vomiting, diarrhea, vaginal bleeding, or vaginal discharge.            Review of Systems   Constitutional:  Negative for chills and fever.   HENT:  Negative for congestion, rhinorrhea and sore throat.    Respiratory:  Negative for cough and shortness of breath.    Cardiovascular:  Negative for chest pain and palpitations.   Gastrointestinal:  Negative for abdominal pain, diarrhea, nausea and vomiting.   Genitourinary:  Positive for dysuria, frequency, hematuria and urgency. Negative for flank pain, vaginal discharge and vaginal pain.   Musculoskeletal:  Negative for back pain and neck pain.   Neurological:  Negative for dizziness, weakness, light-headedness, numbness and headaches.   All other systems reviewed and are negative.          Objective     ED Triage Vitals [09/15/24 2352]   Temperature Pulse Blood Pressure Respirations SpO2 Patient Position - Orthostatic VS   98.5 °F (36.9 °C) 80 (!) 171/79 18 97 % Sitting      Temp Source Heart Rate Source BP Location FiO2 (%) Pain Score    Oral Monitor Right arm -- --        Physical Exam  Vitals and nursing note reviewed.   Constitutional:       General: She is not in acute distress.     Appearance: Normal appearance. She is normal weight. She is not ill-appearing.   HENT:      Head: Normocephalic and atraumatic.      Right Ear: External ear normal.      Left Ear: External ear normal.      Nose: Nose normal. No congestion or rhinorrhea.      Mouth/Throat:      Mouth: Mucous membranes are moist.      Pharynx: Oropharynx is clear. No oropharyngeal exudate or posterior oropharyngeal erythema.   Eyes:      Extraocular Movements: Extraocular movements intact.      Conjunctiva/sclera: Conjunctivae normal.      Pupils: Pupils are equal, round,  and reactive to light.   Cardiovascular:      Rate and Rhythm: Normal rate and regular rhythm.      Pulses: Normal pulses.      Heart sounds: Normal heart sounds. No murmur heard.  Pulmonary:      Effort: Pulmonary effort is normal. No respiratory distress.      Breath sounds: Normal breath sounds. No wheezing or rales.   Abdominal:      General: Abdomen is flat. Bowel sounds are normal. There is no distension.      Palpations: Abdomen is soft.      Tenderness: There is no abdominal tenderness. There is no right CVA tenderness, left CVA tenderness or guarding.      Comments: Reported occasional suprapubic discomfort but no abdominal tenderness on examination.  No guarding or rebound.  No CVA tenderness.   Musculoskeletal:         General: No swelling or tenderness. Normal range of motion.      Cervical back: Normal range of motion and neck supple. No tenderness.   Skin:     General: Skin is warm and dry.      Capillary Refill: Capillary refill takes less than 2 seconds.   Neurological:      General: No focal deficit present.      Mental Status: She is alert and oriented to person, place, and time.         Labs Reviewed   CBC AND DIFFERENTIAL - Abnormal       Result Value    WBC 12.90 (*)     RBC 4.19      Hemoglobin 12.9      Hematocrit 38.8      MCV 93      MCH 30.8      MCHC 33.2      RDW 13.1      MPV 10.2      Platelets 258      nRBC 0      Segmented % 69      Immature Grans % 0      Lymphocytes % 20      Monocytes % 7      Eosinophils Relative 3      Basophils Relative 1      Absolute Neutrophils 8.99 (*)     Absolute Immature Grans 0.05      Absolute Lymphocytes 2.51      Absolute Monocytes 0.85      Eosinophils Absolute 0.42      Basophils Absolute 0.08     UA W REFLEX TO MICROSCOPIC WITH REFLEX TO CULTURE - Abnormal    Color, UA Red (*)     Clarity, UA Cloudy (*)     Specific Gravity, UA 1.020      pH, UA 7.0      Leukocytes, UA 2+ (*)     Nitrite, UA Positive (*)     Protein, UA 3+ (*)     Glucose, UA  Negative      Ketones, UA Trace (*)     Urobilinogen, UA 2.0      Bilirubin, UA Negative      Occult Blood, UA 3+ (*)    URINE MICROSCOPIC - Abnormal    RBC, UA Field obscured, unable to enumerate (*)     WBC, UA 20-30 (*)     Epithelial Cells Occasional      Bacteria, UA Occasional     URINE CULTURE   COMPREHENSIVE METABOLIC PANEL    Sodium 136      Potassium 3.6      Chloride 100      CO2 27      ANION GAP 9      BUN 12      Creatinine 0.76      Glucose 113      Calcium 9.2      AST 20      ALT 12      Alkaline Phosphatase 68      Total Protein 6.8      Albumin 3.9      Total Bilirubin 0.46      eGFR 73      Narrative:     [employer] Kid[employer] guidelines for Chronic Kidney Disease (CKD):     Stage 1 with normal or high GFR (GFR > 90 mL/min/1.73 square meters)    Stage 2 Mild CKD (GFR = 60-89 mL/min/1.73 square meters)    Stage 3A Moderate CKD (GFR = 45-59 mL/min/1.73 square meters)    Stage 3B Moderate CKD (GFR = 30-44 mL/min/1.73 square meters)    Stage 4 Severe CKD (GFR = 15-29 mL/min/1.73 square meters)    Stage 5 End Stage CKD (GFR <15 mL/min/1.73 square meters)  Note: GFR calculation is accurate only with a steady state creatinine     No orders to display       Procedures       Javed Colvin MD  09/16/24 0055

## 2024-09-18 LAB
BACTERIA UR CULT: ABNORMAL
BACTERIA UR CULT: ABNORMAL

## 2024-10-12 ENCOUNTER — APPOINTMENT (OUTPATIENT)
Dept: LAB | Facility: HOSPITAL | Age: 79
End: 2024-10-12
Payer: COMMERCIAL

## 2024-10-12 DIAGNOSIS — N30.01 ACUTE HEMORRHAGIC CYSTITIS: ICD-10-CM

## 2024-10-12 LAB
BACTERIA UR QL AUTO: ABNORMAL /HPF
BILIRUB UR QL STRIP: NEGATIVE
CLARITY UR: CLEAR
COLOR UR: YELLOW
GLUCOSE UR STRIP-MCNC: NEGATIVE MG/DL
HGB UR QL STRIP.AUTO: NEGATIVE
KETONES UR STRIP-MCNC: NEGATIVE MG/DL
LEUKOCYTE ESTERASE UR QL STRIP: ABNORMAL
NITRITE UR QL STRIP: NEGATIVE
NON-SQ EPI CELLS URNS QL MICRO: ABNORMAL /HPF
PH UR STRIP.AUTO: 6 [PH]
PROT UR STRIP-MCNC: NEGATIVE MG/DL
RBC #/AREA URNS AUTO: ABNORMAL /HPF
SP GR UR STRIP.AUTO: 1.01 (ref 1–1.03)
UROBILINOGEN UR QL STRIP.AUTO: 0.2 E.U./DL
WBC #/AREA URNS AUTO: ABNORMAL /HPF

## 2024-10-12 PROCEDURE — 81001 URINALYSIS AUTO W/SCOPE: CPT

## 2024-10-12 PROCEDURE — 87086 URINE CULTURE/COLONY COUNT: CPT

## 2024-10-13 LAB — BACTERIA UR CULT: NORMAL

## 2024-10-29 ENCOUNTER — HOSPITAL ENCOUNTER (OUTPATIENT)
Facility: HOSPITAL | Age: 79
Discharge: HOME | End: 2024-10-29
Payer: COMMERCIAL

## 2024-10-29 VITALS — WEIGHT: 194 LBS | HEIGHT: 65 IN | BODY MASS INDEX: 32.32 KG/M2

## 2024-10-29 DIAGNOSIS — M81.0 AGE-RELATED OSTEOPOROSIS WITHOUT CURRENT PATHOLOGICAL FRACTURE: ICD-10-CM

## 2024-10-29 PROCEDURE — 77080 DXA BONE DENSITY AXIAL: CPT

## 2024-11-11 ENCOUNTER — TELEPHONE (OUTPATIENT)
Dept: FAMILY MEDICINE CLINIC | Facility: CLINIC | Age: 79
End: 2024-11-11

## 2024-11-11 DIAGNOSIS — E03.9 ACQUIRED HYPOTHYROIDISM: ICD-10-CM

## 2024-11-11 RX ORDER — LEVOTHYROXINE SODIUM 100 MCG
TABLET ORAL
Qty: 30 TABLET | Refills: 0 | OUTPATIENT
Start: 2024-11-11

## 2024-11-11 NOTE — TELEPHONE ENCOUNTER
Pt has switched to Dr. Case/GLOR[facility]. I will deny this refill - please have me removed as PCP please.

## 2024-12-30 ENCOUNTER — TELEPHONE (OUTPATIENT)
Age: 79
End: 2024-12-30

## 2024-12-30 NOTE — TELEPHONE ENCOUNTER
Patient called in to speak with Kaylen Patiño. She did not want to discuss why she was calling. She will call back on 12/31/24 to see if she is available.

## 2024-12-30 NOTE — TELEPHONE ENCOUNTER
The patient left this practice and is now going to [facility] - they did her Medicare Wellness. She is also seeing endocrinology. I recommend she continue care with them, as and I are not in agreement with how to manage her care.

## 2024-12-30 NOTE — TELEPHONE ENCOUNTER
PT called and rescheduled her wellness and would like blood work put in the system for her to get all her regular physical bloodwork plus a T4, TSH, Vitamin D, Vitamin B12, Homocysteine and iodine because she uses a lot of hemopathetic medications. PT scheduled next available appt with DR greco for march

## 2024-12-30 NOTE — TELEPHONE ENCOUNTER
Would you like any additional lab orders to be completed that may not already be ordered in patient's chart? Please advise.     Also, I am unsure why you are not listed as her PCP.

## 2025-01-09 NOTE — TELEPHONE ENCOUNTER
Patient called in to speak with Kaylen Patiño, attempted to warm tx but got her VM.  Patient asked if she could please call her back at [phone]. Thanks.

## 2025-01-30 ENCOUNTER — OFFICE VISIT (OUTPATIENT)
Dept: SURGERY | Facility: CLINIC | Age: 80
End: 2025-01-30
Payer: COMMERCIAL

## 2025-01-30 ENCOUNTER — TELEPHONE (OUTPATIENT)
Age: 80
End: 2025-01-30

## 2025-01-30 VITALS
DIASTOLIC BLOOD PRESSURE: 76 MMHG | OXYGEN SATURATION: 97 % | SYSTOLIC BLOOD PRESSURE: 132 MMHG | TEMPERATURE: 97.2 F | BODY MASS INDEX: 32.26 KG/M2 | HEIGHT: 65 IN | HEART RATE: 81 BPM | WEIGHT: 193.6 LBS

## 2025-01-30 DIAGNOSIS — N60.19 FIBROCYSTIC BREAST DISEASE: Primary | ICD-10-CM

## 2025-01-30 PROCEDURE — 99213 OFFICE O/P EST LOW 20 MIN: CPT | Performed by: SURGERY

## 2025-01-30 NOTE — TELEPHONE ENCOUNTER
Patient called insisting on scheduling a new patient appointment with Dr. Faust and no one else. She mentioned she was referred by Dr Patel. She said its just to establish care for a new pcp. Please advise back to patient to further assist. Thank you.

## 2025-01-30 NOTE — PROGRESS NOTES
"Name: Mariah Mendiola      : 1945      MRN: [id number]  Encounter Provider: Robert Bloch, MD  Encounter Date: 2025   Encounter department: [facility] GENERAL SURGERY [place]  :  Assessment & Plan  Fibrocystic breast disease             History of Present Illness   HPI  Mariah Mendiola is a 79 y.o. female who presents for yearly follow-up.  She has a history of fibrocystic breast disease and had multiple breast biopsies in the past.  For at least the last 15 years or so she has been categorically refusing a mammogram.  I did talk her into an ultrasound a couple years ago and that was negative.  She until recently has been going for thermograms which have been negative.  In the last year, she has noticed no mass in her breast, discharge or bleeding from her nipples.  Nor is there any pain  History obtained from: patient    Review of Systems  Past Medical History   Past Medical History:   Diagnosis Date    Allergic     Anesthesia     per pt after hip replacement surgery \"a narcotic made blood pressure drop-after surgery\"    Anxiety     \"Preop anxiety\"    Arthritis 2016    Basal cell carcinoma     Breast lump     \"56 yrs ago and benign\"    Cancer (HCC)     Chronic sinusitis     Costochondritis     Counseling regarding advanced directives 2/3/2022    Medical POA daughter Ama Rogel, if there is hope recuscitate, if there is no hope DNR. She states she has talked to her and wants her daughter to make the decision. Follow Advanced Directive. She will bring in paperwork. (slw 10/5/2023) Wants  and daughter to do what they make them comfortable.        Dental bridge present     Dental crowns present     Disease of thyroid gland     Exercises three times per week     to the gym and pickle ball    Family history of pseudocholinesterase deficiency     per pt \"Daughter has very severe reaction -was on ventilator after a deviated septum surgery\"    Hiatal hernia     History of kidney stones     " "History of pneumonia     History of transfusion     \"received own blood for bilat hip replacement sx\"    No vaccination-pt refuse 7/24/2021    Tinnitus     at times    Wears glasses      Past Surgical History:   Procedure Laterality Date    BREAST BIOPSY Left     BREAST CYST EXCISION Left     BREAST SURGERY      Puncture aspiration of a cyst    COLONOSCOPY      JOINT REPLACEMENT      both hips    NM EXCISION TUMOR SOFT TIS BACK/FLANK SUBQ 3 CM/> Right 1/10/2024    Procedure: EXCISION MASS RIGHT SIDE OF BACK;  Surgeon: Robert Bloch, MD;  Location:  MAIN OR;  Service: General    SKIN CANCER EXCISION      one eyelid    SKIN LESION EXCISION      Back, Lt Face    TONSILLECTOMY      TOTAL HIP ARTHROPLASTY Bilateral     Impression 2/25/16    WISDOM TOOTH EXTRACTION       Family History   Problem Relation Age of Onset    Diabetes Father     Hearing loss Father     Uterine cancer Family     Stroke Mother     Hearing loss Mother     Vision loss Mother     Thyroid disease Mother     Thyroid disease unspecified Mother     Hypothyroidism Mother     No Known Problems Brother     No Known Problems Daughter     Endometrial cancer Maternal Grandmother     Heart disease Neg Hx       reports that she quit smoking about 60 years ago. Her smoking use included cigarettes. She started smoking about 65 years ago. She has a 6 pack-year smoking history. She has never used smokeless tobacco. She reports current alcohol use. She reports that she does not use drugs.  Current Outpatient Medications on File Prior to Visit   Medication Sig Dispense Refill    Accu-Chek Softclix Lancets lancets Use daily Testing once daily dx R73.01 100 each 3    Cholecalciferol (Vitamin D3) 10 MCG/ML LIQD Take 5,000 Units by mouth daily      COLLAGEN PO Take by mouth With pre and probiotic powder in drink      Cyanocobalamin (Vitamin B-12) 3000 MCG SUBL Place under the tongue      glucose blood test strip Use 1 each daily Testing once daily Accu Check Lashae Plus " "ALYCE BLOUNT test strips dx R73.01 100 each 3    glucose blood test strip Use 1 each daily Testing once daily dx R73.01 Accu Check Lashae Plus ALYCE BLOUNT test strips 100 each 3    Multiple Vitamins-Minerals (MULTIVITAMIN ADULT PO) Take by mouth      Synthroid 100 MCG tablet Take SIX days a week and repeat blood work in 12 weeks. BRAND NECESSARY 30 tablet 5    NON FORMULARY daily nattokinase (Patient not taking: Reported on 1/30/2025)      NON FORMULARY daily Belluca (Patient not taking: Reported on 1/30/2025)      ondansetron (ZOFRAN-ODT) 4 mg disintegrating tablet Take 1 tablet (4 mg total) by mouth every 6 (six) hours as needed for nausea or vomiting 20 tablet 0    phenazopyridine (PYRIDIUM) 200 mg tablet Take 1 tablet (200 mg total) by mouth 3 (three) times a day 6 tablet 0     No current facility-administered medications on file prior to visit.     Allergies   Allergen Reactions    Sulfa Antibiotics Shortness Of Breath     \"All sulfa --even creams\"         Objective   /76 (BP Location: Right arm, Patient Position: Sitting, Cuff Size: Standard)   Pulse 81   Temp (!) 97.2 °F (36.2 °C) (Tympanic)   Ht 5' 4.75\" (1.645 m)   Wt 87.8 kg (193 lb 9.6 oz)   SpO2 97%   BMI 32.47 kg/m²      Physical Exam  Vitals reviewed.   Constitutional:       Appearance: Normal appearance. She is obese. She is not ill-appearing.   HENT:      Head: Normocephalic and atraumatic.   Eyes:      General: No scleral icterus.     Conjunctiva/sclera: Conjunctivae normal.   Cardiovascular:      Rate and Rhythm: Normal rate and regular rhythm.      Heart sounds: Normal heart sounds. No murmur heard.  Pulmonary:      Effort: Pulmonary effort is normal. No respiratory distress.      Breath sounds: Normal breath sounds. No stridor. No wheezing, rhonchi or rales.   Musculoskeletal:         General: Normal range of motion.      Cervical back: Normal range of motion.   Lymphadenopathy:      Cervical: No cervical adenopathy.   Skin:     Findings: " Lesion present.      Comments: She has a scar on the right side of her back from excision of a large cyst a year or so ago.  She has multiple benign-appearing seborrheic keratoses none of which need biopsy   Neurological:      Mental Status: She is oriented to person, place, and time.   Psychiatric:         Mood and Affect: Mood normal.         Behavior: Behavior normal.         Thought Content: Thought content normal.         Judgment: Judgment normal.

## 2025-02-03 ENCOUNTER — OFFICE VISIT (OUTPATIENT)
Dept: INTERNAL MEDICINE CLINIC | Facility: CLINIC | Age: 80
End: 2025-02-03
Payer: COMMERCIAL

## 2025-02-03 VITALS
HEIGHT: 65 IN | BODY MASS INDEX: 31.99 KG/M2 | SYSTOLIC BLOOD PRESSURE: 128 MMHG | OXYGEN SATURATION: 100 % | WEIGHT: 192 LBS | TEMPERATURE: 98.9 F | DIASTOLIC BLOOD PRESSURE: 76 MMHG | RESPIRATION RATE: 16 BRPM | HEART RATE: 72 BPM

## 2025-02-03 DIAGNOSIS — N60.19 FIBROCYSTIC BREAST DISEASE (FCBD), UNSPECIFIED LATERALITY: ICD-10-CM

## 2025-02-03 DIAGNOSIS — R73.01 IFG (IMPAIRED FASTING GLUCOSE): ICD-10-CM

## 2025-02-03 DIAGNOSIS — R00.2 PALPITATIONS: Primary | ICD-10-CM

## 2025-02-03 DIAGNOSIS — E03.9 ACQUIRED HYPOTHYROIDISM: ICD-10-CM

## 2025-02-03 DIAGNOSIS — M79.7 FIBROMYALGIA: ICD-10-CM

## 2025-02-03 DIAGNOSIS — Z86.39 HISTORY OF VITAMIN D DEFICIENCY: ICD-10-CM

## 2025-02-03 DIAGNOSIS — R79.89 INCREASED HOMOCYSTEINE: ICD-10-CM

## 2025-02-03 DIAGNOSIS — E78.00 HYPERCHOLESTEROLEMIA: ICD-10-CM

## 2025-02-03 DIAGNOSIS — D51.3 OTHER DIETARY VITAMIN B12 DEFICIENCY ANEMIA: ICD-10-CM

## 2025-02-03 DIAGNOSIS — Z86.39 HISTORY OF NON ANEMIC VITAMIN B12 DEFICIENCY: ICD-10-CM

## 2025-02-03 DIAGNOSIS — E67.3 HYPERVITAMINOSIS D: ICD-10-CM

## 2025-02-03 DIAGNOSIS — R79.89 LOW PLASMA TOTAL HOMOCYSTEINE: ICD-10-CM

## 2025-02-03 DIAGNOSIS — Z76.89 ENCOUNTER TO ESTABLISH CARE: ICD-10-CM

## 2025-02-03 PROCEDURE — 99204 OFFICE O/P NEW MOD 45 MIN: CPT | Performed by: INTERNAL MEDICINE

## 2025-02-03 NOTE — ASSESSMENT & PLAN NOTE
Patient is experiencing intermittent palpitations, epigastric discomfort following meals and also associated with anxiety.  Symptoms are stable for past 15 years.  Patient also has a hiatal hernia.  Patient is engaging in physical activities including lifting weight.  But denies any exertional chest pain, SOB, palpitations.  No echocardiogram available in the chart.  EKG 1 year ago shows evidence of left atrial enlargement.  Stress test more than 10 years ago was normal according to the patient.   No prior history of CAD, CVA.  No significant family history of coronary artery disease.  SHERYL-7 score 4.  Palpitations plan chest discomfort unlikely to be due to CAD as patient is having stable symptoms for past 15 years.  Could be related to anxiety versus GERD.    Plan  Check echocardiogram

## 2025-02-03 NOTE — PROGRESS NOTES
INTERNAL MEDICINE INITIAL OFFICE VISIT  [facility] INTERNAL MEDICINE [place]    NAME: Mariah Mendiola  AGE: 79 y.o. SEX: female  : 1945     DATE: 2/3/2025     Assessment and Plan:     1. Palpitations  Assessment & Plan:  Patient is experiencing intermittent palpitations, epigastric discomfort following meals and also associated with anxiety.  Symptoms are stable for past 15 years.  Patient also has a hiatal hernia.  Patient is engaging in physical activities including lifting weight.  But denies any exertional chest pain, SOB, palpitations.  No echocardiogram available in the chart.  EKG 1 year ago shows evidence of left atrial enlargement.  Stress test more than 10 years ago was normal according to the patient.   No prior history of CAD, CVA.  No significant family history of coronary artery disease.  SHERYL-7 score 4.  Palpitations plan chest discomfort unlikely to be due to CAD as patient is having stable symptoms for past 15 years.  Could be related to anxiety versus GERD.    Plan  Check echocardiogram  Orders:  -     Echo complete w/ contrast if indicated; Future; Expected date: 2025  2. Encounter to establish care  Assessment & Plan:  Patient presents to the clinic to establish care.  Complains of chronic epigastric pain related to fibromyalgia, anxiety, intermittent palpitations, epigastric discomfort due to GERD related to her hiatal hernia.    Plan  Check A1c, lipid panel  Repeat thyroid function tests  Patient request homocystine levels due to history of elevated homocystine.  Check vitamin D and B12 levels  Patient has been declining mammograms.  DEXA scan up-to-date.  Check echocardiogram   Orders:  -     Hemoglobin A1C; Future  -     Lipid Panel with Direct LDL reflex; Future  -     TSH, 3rd generation with Free T4 reflex; Future  -     Vitamin B12; Future  -     C-reactive protein; Future  -     Echo complete w/ contrast if indicated; Future; Expected date:  02/03/2025  3. Hypercholesterolemia  Assessment & Plan:  Lipid panel for a year ago shows elevated LDL at 126, total cholesterol 214, HDL and TG within normal range.  Has prediabetes, no history of CAD or CVA.    Plan  Check lipid panel  Orders:  -     Lipid Panel with Direct LDL reflex; Future  4. IFG (impaired fasting glucose)  Assessment & Plan:  A1c a year ago was 6.1%.    Plan  Check HbA1c  Check lipid panel  Discussed about lifestyle modifications  Orders:  -     Hemoglobin A1C; Future  5. Acquired hypothyroidism  Assessment & Plan:  Recent thyroid function test appears within normal range.  Patient complains of difficulty losing weight, hair thinning that hair loss.  Following up with endocrinology.    Plan  Continue Synthroid 100 mcg daily  Check thyroid function test  Orders:  -     TSH, 3rd generation with Free T4 reflex; Future  6. Fibrocystic breast disease (FCBD), unspecified laterality  Assessment & Plan:  Patient has a history of fibrocystic breast disease.    Following up with general surgery.  According to the notes patient is declining mammograms.  Has had ultrasound scans and thermograms which was normal.  Currently not experiencing any breast lumps, overlying skin changes or nipple discharges.    7. Fibromyalgia  Assessment & Plan:  Patient has chronic pain in epigastric region.  Migrating through shoulders, leg, hip and legs.  Pain responds to NSAIDs and heat.  Orders:  -     C-reactive protein; Future  8. History of vitamin D deficiency  -     Vitamin D 25 hydroxy; Future  9. History of non anemic vitamin B12 deficiency  -     Vitamin B12; Future  10. Low plasma total homocysteine  11. Increased homocysteine  -     Homocysteine; Future  12. Hypervitaminosis D  -     Vitamin D 25 hydroxy; Future  13. Other dietary vitamin B12 deficiency anemia  -     Homocysteine; Future      No follow-ups on file.     Chief Complaint:     Chief Complaint   Patient presents with    Establish Care     Hx of  thyroid problems, Hx arthritis      History of Present Illness:     79-year-old female with past medical history of hypothyroidism, fibromyalgia, IBS, IGF, fibrocystic breast disease presents to the clinic to establish care.   Patient has chronic stable medical conditions.  She is on levothyroxine 100 mcg daily for hypothyroidism.  Complains of difficulty losing weight, hair thinning and hair loss.  She is compliant with the medication.    Patient has fibromyalgia.  Having epigastric discomfort.  Pain migrates through her shoulders, upper arms legs.  Respond to NSAIDs and heat.    Patient also complains of intermittent palpitations and epigastric discomfort for past 15 years which are stable.  Also mentioned that she has anxiety.  Palpitations are sometimes related to her anxiety.  Experiences it once in a while.  Patient is engaging in physical activities including weightlifting.  But denies exertional chest pain, SOB, palpitations.  No significant personal or family history of CAD or CVA.      The following portions of the patient's history were reviewed and updated as appropriate: allergies, current medications, past family history, past medical history, past social history, past surgical history and problem list.     Review of Systems:     Review of Systems   Constitutional:  Negative for chills and fever.   HENT:  Negative for ear pain and sore throat.    Eyes:  Negative for pain and visual disturbance.   Respiratory:  Negative for cough and shortness of breath.    Cardiovascular:  Positive for palpitations. Negative for chest pain.   Gastrointestinal:  Positive for abdominal pain (epigastric pain). Negative for vomiting.   Endocrine:        Thinning of hair   Genitourinary:  Negative for dysuria and hematuria.   Musculoskeletal:  Negative for arthralgias and back pain.   Skin:  Negative for color change and rash.   Neurological:  Negative for seizures and syncope.   Psychiatric/Behavioral:  The patient is  "nervous/anxious.    All other systems reviewed and are negative.       Past Medical History:     Past Medical History:   Diagnosis Date    Allergic     Anesthesia     per pt after hip replacement surgery \"a narcotic made blood pressure drop-after surgery\"    Anxiety     \"Preop anxiety\"    Arthritis 02/25/2016    Basal cell carcinoma     Breast lump     \"56 yrs ago and benign\"    Cancer (HCC)     Chronic sinusitis     Costochondritis     Counseling regarding advanced directives 2/3/2022    Medical POA daughter Ama Rogel, if there is hope recuscitate, if there is no hope DNR. She states she has talked to her and wants her daughter to make the decision. Follow Advanced Directive. She will bring in paperwork. (slw 10/5/2023) Wants  and daughter to do what they make them comfortable.        Dental bridge present     Dental crowns present     Disease of thyroid gland     Exercises three times per week     to the gym and pickle ball    Family history of pseudocholinesterase deficiency     per pt \"Daughter has very severe reaction -was on ventilator after a deviated septum surgery\"    Hiatal hernia     History of kidney stones     History of pneumonia     History of transfusion     \"received own blood for bilat hip replacement sx\"    No vaccination-pt refuse 7/24/2021    Tinnitus     at times    Wears glasses         Past Surgical History:     Past Surgical History:   Procedure Laterality Date    BREAST BIOPSY Left     BREAST CYST EXCISION Left     BREAST SURGERY      Puncture aspiration of a cyst    COLONOSCOPY      JOINT REPLACEMENT      both hips    VA EXCISION TUMOR SOFT TIS BACK/FLANK SUBQ 3 CM/> Right 1/10/2024    Procedure: EXCISION MASS RIGHT SIDE OF BACK;  Surgeon: Robert Bloch, MD;  Location:  MAIN OR;  Service: General    SKIN CANCER EXCISION      one eyelid    SKIN LESION EXCISION      Back, Lt Face    TONSILLECTOMY      TOTAL HIP ARTHROPLASTY Bilateral     Impression 2/25/16    WISDOM TOOTH " "EXTRACTION          Social History:   She reports that she quit smoking about 60 years ago. Her smoking use included cigarettes. She started smoking about 65 years ago. She has a 6 pack-year smoking history. She has never used smokeless tobacco. She reports that she does not currently use alcohol. She reports that she does not use drugs.     Family History:     Family History   Problem Relation Age of Onset    Diabetes Father     Hearing loss Father     Uterine cancer Family     Stroke Mother     Hearing loss Mother     Vision loss Mother     Thyroid disease Mother     Thyroid disease unspecified Mother     Hypothyroidism Mother     No Known Problems Brother     No Known Problems Daughter     Endometrial cancer Maternal Grandmother     Heart disease Neg Hx         Current Medications:     Current Outpatient Medications:     Accu-Chek Softclix Lancets lancets, Use daily Testing once daily dx R73.01, Disp: 100 each, Rfl: 3    Cholecalciferol (Vitamin D3) 10 MCG/ML LIQD, Take 5,000 Units by mouth daily, Disp: , Rfl:     glucose blood test strip, Use 1 each daily Testing once daily dx R73.01 Accu Check Lashae Plus ALYCE BLOUNT test strips, Disp: 100 each, Rfl: 3    Multiple Vitamins-Minerals (MULTIVITAMIN ADULT PO), Take by mouth, Disp: , Rfl:     Synthroid 100 MCG tablet, Take SIX days a week and repeat blood work in 12 weeks. BRAND NECESSARY, Disp: 30 tablet, Rfl: 5     Allergies:     Allergies   Allergen Reactions    Sulfa Antibiotics Shortness Of Breath     \"All sulfa --even creams\"        Physical Exam:     /76 (BP Location: Left arm, Patient Position: Sitting, Cuff Size: Large)   Pulse 72   Temp 98.9 °F (37.2 °C) (Tympanic)   Resp 16   Ht 5' 5\" (1.651 m)   Wt 87.1 kg (192 lb)   SpO2 100%   BMI 31.95 kg/m²     Physical Exam  Constitutional:       General: She is not in acute distress.     Appearance: Normal appearance. She is not ill-appearing.   HENT:      Head: Normocephalic.      Nose: No congestion.     "  Mouth/Throat:      Mouth: Mucous membranes are moist.      Pharynx: Oropharynx is clear.   Cardiovascular:      Pulses: Normal pulses.      Heart sounds: Normal heart sounds.   Pulmonary:      Effort: Pulmonary effort is normal.      Breath sounds: Normal breath sounds.   Abdominal:      General: Bowel sounds are normal.      Palpations: Abdomen is soft.   Musculoskeletal:         General: No swelling.   Skin:     Capillary Refill: Capillary refill takes less than 2 seconds.   Neurological:      General: No focal deficit present.      Mental Status: She is alert and oriented to person, place, and time.          Data:     Laboratory Results: I have personally reviewed the pertinent laboratory results/reports   Radiology/Other Diagnostic Testing Results: Results Review Statement: I personally reviewed the following image studies in PACS and associated radiology reports: DEXA scan. My interpretation of the radiology images/reports is:  .    Kelly Hart MD  [facility] INTERNAL MEDICINE [place]

## 2025-02-03 NOTE — ASSESSMENT & PLAN NOTE
Recent thyroid function test appears within normal range.  Patient complains of difficulty losing weight, hair thinning that hair loss.  Following up with endocrinology.    Plan  Continue Synthroid 100 mcg daily  Check thyroid function test

## 2025-02-03 NOTE — ASSESSMENT & PLAN NOTE
Patient has chronic pain in epigastric region.  Migrating through shoulders, leg, hip and legs.  Pain responds to NSAIDs and heat.

## 2025-02-03 NOTE — ASSESSMENT & PLAN NOTE
A1c a year ago was 6.1%.    Plan  Check HbA1c  Check lipid panel  Discussed about lifestyle modifications

## 2025-02-03 NOTE — ASSESSMENT & PLAN NOTE
Lipid panel for a year ago shows elevated LDL at 126, total cholesterol 214, HDL and TG within normal range.  Has prediabetes, no history of CAD or CVA.    Plan  Check lipid panel

## 2025-02-03 NOTE — ASSESSMENT & PLAN NOTE
Patient has a history of fibrocystic breast disease.    Following up with general surgery.  According to the notes patient is declining mammograms.  Has had ultrasound scans and thermograms which was normal.  Currently not experiencing any breast lumps, overlying skin changes or nipple discharges.

## 2025-02-03 NOTE — ASSESSMENT & PLAN NOTE
Patient presents to the clinic to establish care.  Complains of chronic epigastric pain related to fibromyalgia, anxiety, intermittent palpitations, epigastric discomfort due to GERD related to her hiatal hernia.    Plan  Check A1c, lipid panel  Repeat thyroid function tests  Patient request homocystine levels due to history of elevated homocystine.  Check vitamin D and B12 levels  Patient has been declining mammograms.  DEXA scan up-to-date.  Check echocardiogram

## 2025-02-08 ENCOUNTER — APPOINTMENT (OUTPATIENT)
Dept: LAB | Facility: HOSPITAL | Age: 80
End: 2025-02-08
Payer: COMMERCIAL

## 2025-02-08 DIAGNOSIS — M79.7 FIBROMYALGIA: ICD-10-CM

## 2025-02-08 DIAGNOSIS — R73.01 IFG (IMPAIRED FASTING GLUCOSE): ICD-10-CM

## 2025-02-08 DIAGNOSIS — D51.3 OTHER DIETARY VITAMIN B12 DEFICIENCY ANEMIA: ICD-10-CM

## 2025-02-08 DIAGNOSIS — R79.89 INCREASED HOMOCYSTEINE: ICD-10-CM

## 2025-02-08 DIAGNOSIS — Z76.89 ENCOUNTER TO ESTABLISH CARE: ICD-10-CM

## 2025-02-08 DIAGNOSIS — Z86.39 HISTORY OF NON ANEMIC VITAMIN B12 DEFICIENCY: ICD-10-CM

## 2025-02-08 DIAGNOSIS — Z86.39 HISTORY OF VITAMIN D DEFICIENCY: ICD-10-CM

## 2025-02-08 DIAGNOSIS — E03.9 ACQUIRED HYPOTHYROIDISM: ICD-10-CM

## 2025-02-08 DIAGNOSIS — E78.00 HYPERCHOLESTEROLEMIA: ICD-10-CM

## 2025-02-08 DIAGNOSIS — E67.3 HYPERVITAMINOSIS D: ICD-10-CM

## 2025-02-08 LAB
25(OH)D3 SERPL-MCNC: 45.4 NG/ML (ref 30–100)
CHOLEST SERPL-MCNC: 186 MG/DL (ref ?–200)
CRP SERPL QL: 5.4 MG/L
EST. AVERAGE GLUCOSE BLD GHB EST-MCNC: 123 MG/DL
HBA1C MFR BLD: 5.9 %
HCYS SERPL-SCNC: 16.3 UMOL/L (ref 5–20)
HDLC SERPL-MCNC: 63 MG/DL
LDLC SERPL CALC-MCNC: 113 MG/DL (ref 0–100)
TRIGL SERPL-MCNC: 48 MG/DL (ref ?–150)
TSH SERPL DL<=0.05 MIU/L-ACNC: 0.91 UIU/ML (ref 0.45–4.5)
VIT B12 SERPL-MCNC: 480 PG/ML (ref 180–914)

## 2025-02-08 PROCEDURE — 86140 C-REACTIVE PROTEIN: CPT

## 2025-02-08 PROCEDURE — 84443 ASSAY THYROID STIM HORMONE: CPT

## 2025-02-08 PROCEDURE — 82607 VITAMIN B-12: CPT

## 2025-02-08 PROCEDURE — 80061 LIPID PANEL: CPT

## 2025-02-08 PROCEDURE — 83036 HEMOGLOBIN GLYCOSYLATED A1C: CPT

## 2025-02-08 PROCEDURE — 83090 ASSAY OF HOMOCYSTEINE: CPT

## 2025-02-08 PROCEDURE — 82306 VITAMIN D 25 HYDROXY: CPT

## 2025-02-08 PROCEDURE — 36415 COLL VENOUS BLD VENIPUNCTURE: CPT

## 2025-02-10 ENCOUNTER — RESULTS FOLLOW-UP (OUTPATIENT)
Dept: INTERNAL MEDICINE CLINIC | Facility: CLINIC | Age: 80
End: 2025-02-10

## 2025-04-23 ENCOUNTER — VBI (OUTPATIENT)
Dept: ADMINISTRATIVE | Facility: OTHER | Age: 80
End: 2025-04-23

## 2025-04-23 NOTE — TELEPHONE ENCOUNTER
Patient contacted to schedule Annual Wellness Visit.   A message was left for the patient to return the call.    Thank you.  Anjana Molina MA  [facility]

## 2025-04-23 NOTE — TELEPHONE ENCOUNTER
Patient contacted to schedule Annual Wellness Visit.   Patient transferred care out of network.     Thank you.  Anjana Molina MA  [facility]

## 2025-05-23 ENCOUNTER — TELEPHONE (OUTPATIENT)
Age: 80
End: 2025-05-23

## 2025-05-23 DIAGNOSIS — M17.0 OSTEOARTHRITIS OF BOTH KNEES, UNSPECIFIED OSTEOARTHRITIS TYPE: Primary | ICD-10-CM

## 2025-05-23 NOTE — PROGRESS NOTES
[facility].

## 2025-05-23 NOTE — TELEPHONE ENCOUNTER
Patient would like an order for PT for her knees send to this fax. [phone] at [facility]. Please call her with questions

## 2025-06-04 ENCOUNTER — NURSE TRIAGE (OUTPATIENT)
Dept: OTHER | Facility: OTHER | Age: 80
End: 2025-06-04

## 2025-06-05 ENCOUNTER — HOSPITAL ENCOUNTER (OUTPATIENT)
Dept: ULTRASOUND IMAGING | Facility: HOSPITAL | Age: 80
Discharge: HOME | End: 2025-06-05
Payer: COMMERCIAL

## 2025-06-05 ENCOUNTER — APPOINTMENT (OUTPATIENT)
Dept: LAB | Facility: HOSPITAL | Age: 80
End: 2025-06-05
Payer: COMMERCIAL

## 2025-06-05 ENCOUNTER — OFFICE VISIT (OUTPATIENT)
Dept: INTERNAL MEDICINE CLINIC | Facility: CLINIC | Age: 80
End: 2025-06-05
Payer: COMMERCIAL

## 2025-06-05 VITALS
RESPIRATION RATE: 16 BRPM | SYSTOLIC BLOOD PRESSURE: 130 MMHG | DIASTOLIC BLOOD PRESSURE: 82 MMHG | HEART RATE: 69 BPM | BODY MASS INDEX: 32.32 KG/M2 | TEMPERATURE: 98 F | HEIGHT: 65 IN | WEIGHT: 194 LBS | OXYGEN SATURATION: 100 %

## 2025-06-05 DIAGNOSIS — N30.01 ACUTE CYSTITIS WITH HEMATURIA: ICD-10-CM

## 2025-06-05 DIAGNOSIS — N30.01 ACUTE CYSTITIS WITH HEMATURIA: Primary | ICD-10-CM

## 2025-06-05 LAB
ANION GAP SERPL CALCULATED.3IONS-SCNC: 8 MMOL/L (ref 4–13)
BUN SERPL-MCNC: 12 MG/DL (ref 5–25)
CALCIUM SERPL-MCNC: 9.6 MG/DL (ref 8.4–10.2)
CHLORIDE SERPL-SCNC: 102 MMOL/L (ref 96–108)
CO2 SERPL-SCNC: 30 MMOL/L (ref 21–32)
CREAT SERPL-MCNC: 0.63 MG/DL (ref 0.6–1.3)
GFR SERPL CREATININE-BSD FRML MDRD: 84 ML/MIN/1.73SQ M
GLUCOSE P FAST SERPL-MCNC: 92 MG/DL (ref 65–99)
POTASSIUM SERPL-SCNC: 3.9 MMOL/L (ref 3.5–5.3)
SL AMB  POCT GLUCOSE, UA: ABNORMAL
SL AMB LEUKOCYTE ESTERASE,UA: ABNORMAL
SL AMB POCT BILIRUBIN,UA: ABNORMAL
SL AMB POCT BLOOD,UA: ABNORMAL
SL AMB POCT CLARITY,UA: CLEAR
SL AMB POCT COLOR,UA: YELLOW
SL AMB POCT KETONES,UA: ABNORMAL
SL AMB POCT NITRITE,UA: ABNORMAL
SL AMB POCT PH,UA: 6
SL AMB POCT SPECIFIC GRAVITY,UA: 1.01
SL AMB POCT URINE PROTEIN: ABNORMAL
SL AMB POCT UROBILINOGEN: 0.2
SODIUM SERPL-SCNC: 140 MMOL/L (ref 135–147)

## 2025-06-05 PROCEDURE — 81002 URINALYSIS NONAUTO W/O SCOPE: CPT

## 2025-06-05 PROCEDURE — 99214 OFFICE O/P EST MOD 30 MIN: CPT

## 2025-06-05 PROCEDURE — 87186 SC STD MICRODIL/AGAR DIL: CPT

## 2025-06-05 PROCEDURE — 87077 CULTURE AEROBIC IDENTIFY: CPT

## 2025-06-05 PROCEDURE — 36415 COLL VENOUS BLD VENIPUNCTURE: CPT

## 2025-06-05 PROCEDURE — G2211 COMPLEX E/M VISIT ADD ON: HCPCS

## 2025-06-05 PROCEDURE — 80048 BASIC METABOLIC PNL TOTAL CA: CPT

## 2025-06-05 PROCEDURE — 76770 US EXAM ABDO BACK WALL COMP: CPT

## 2025-06-05 PROCEDURE — 87086 URINE CULTURE/COLONY COUNT: CPT

## 2025-06-05 RX ORDER — CEFPODOXIME PROXETIL 200 MG/1
200 TABLET, FILM COATED ORAL 2 TIMES DAILY
Qty: 10 TABLET | Refills: 0 | Status: SHIPPED | OUTPATIENT
Start: 2025-06-05 | End: 2025-06-10

## 2025-06-05 RX ORDER — FLUOROMETHOLONE 1 MG/ML
SUSPENSION/ DROPS OPHTHALMIC
COMMUNITY
Start: 2025-03-26

## 2025-06-05 RX ORDER — KETOCONAZOLE 20 MG/G
CREAM TOPICAL
COMMUNITY
Start: 2025-04-30 | End: 2025-06-05

## 2025-06-05 RX ORDER — HYDROCORTISONE 25 MG/G
CREAM TOPICAL
COMMUNITY
Start: 2025-04-30 | End: 2025-06-05

## 2025-06-05 NOTE — ADDENDUM NOTE
Addended by: DOMINIQUE STAHL on: 6/5/2025 12:41 PM     Modules accepted: Orders, Level of Service

## 2025-06-05 NOTE — TELEPHONE ENCOUNTER
"Regarding: UTI symptoms  ----- Message from Maribell FLOYD sent at 6/4/2025  9:58 PM EDT -----  \"I think I may have a UTI, I'm having some burning and some blood when I pee, I would like to be put on antibiotics without going to the ER\"    "

## 2025-06-05 NOTE — PROGRESS NOTES
Name: Mariah Mendiola      : 1945      MRN: [id number]  Encounter Provider: Brittani Guo MD  Encounter Date: 2025   Encounter department: [facility] INTERNAL MEDICINE [place]  :  Assessment & Plan  Acute cystitis with hematuria  Has lower urinary tract symptoms including frequency, dysuria with pinkish urine  Denies any fever, nausea vomiting abdominal pain  Patient looks systemically well  Suspect acute cystitis    Plan   Get an updated kidney bladder ultrasound  Repeat BMP  Will start treatment with cefpodoxime 200 twice daily  Await urine culture for susceptibilities  Orders:    US kidney and bladder with pvr; Future    Basic metabolic panel; Future    cefpodoxime (VANTIN) 200 mg tablet; Take 1 tablet (200 mg total) by mouth 2 (two) times a day for 5 days    POCT urine dip    Urine culture; Future           History of Present Illness   HPI  Mrs Mariah Mendiola is a pleasant 79-year-old who presents today for sick visit  She reports that last evening she started having increased frequency, dysuria and pinkish urine.  She had some lower abdominal discomfort denied any nausea, vomiting, fever or flank pain.  She has been also having more urgency.  She started taking cranberry supplements which seem to help improve her symptoms  We note that she has an allergy to sulfa medications  Last UTI was 2024 with urine culture growing Klebsiella pneumonia pansensitive  She has a history of kidney stones in 2015 with mild hydronephrosis.  No repeat imaging since then  Kidney function is stable  We discussed empiric treatment for acute cystitis.  Third-generation cephalosporin  Will get an updated kidney bladder ultrasound and kidney function.  Will touch base with results  Review of Systems   Constitutional:  Negative for chills and fever.   HENT:  Negative for ear pain and sore throat.    Eyes:  Negative for pain and visual disturbance.   Respiratory:  Negative for cough and shortness of  "breath.    Cardiovascular:  Negative for chest pain and palpitations.   Gastrointestinal:  Negative for abdominal pain, diarrhea, nausea and vomiting.   Genitourinary:  Positive for dysuria, frequency and urgency. Negative for decreased urine volume, flank pain and hematuria.   Musculoskeletal:  Negative for arthralgias, back pain and myalgias.   Skin:  Negative for color change and rash.   Neurological:  Negative for seizures and syncope.   All other systems reviewed and are negative.      Objective   /82 (BP Location: Left arm, Patient Position: Sitting, Cuff Size: Large)   Pulse 69   Temp 98 °F (36.7 °C) (Tympanic)   Resp 16   Ht 5' 5\" (1.651 m)   Wt 88 kg (194 lb)   SpO2 100%   BMI 32.28 kg/m²      Physical Exam  Vitals and nursing note reviewed.   Constitutional:       General: She is not in acute distress.     Appearance: She is well-developed.   HENT:      Head: Normocephalic and atraumatic.     Eyes:      Conjunctiva/sclera: Conjunctivae normal.       Cardiovascular:      Rate and Rhythm: Normal rate and regular rhythm.      Heart sounds: No murmur heard.  Pulmonary:      Effort: Pulmonary effort is normal. No respiratory distress.      Breath sounds: Normal breath sounds.   Abdominal:      Palpations: Abdomen is soft.      Tenderness: There is no abdominal tenderness.     Musculoskeletal:         General: No swelling.      Cervical back: Neck supple.     Skin:     General: Skin is warm and dry.      Capillary Refill: Capillary refill takes less than 2 seconds.     Neurological:      Mental Status: She is alert.     Psychiatric:         Mood and Affect: Mood normal.         "

## 2025-06-05 NOTE — TELEPHONE ENCOUNTER
"REASON FOR CONVERSATION: Blood in Urine    SYMPTOMS: burning with urination, urinary frequency, pink tinged urine.    OTHER HEALTH INFORMATION: No fever, pain at 8-10    PROTOCOL DISPOSITION: See PCP Within 24 Hours    CARE ADVICE PROVIDED: RN advised to increase fluid intake. PCP appointment made for 6/5 at 9am    PRACTICE FOLLOW-UP: NA      Reason for Disposition   Urinating more frequently than usual (i.e., frequency) OR new-onset of the feeling of an urgent need to urinate (i.e., urgency)    Answer Assessment - Initial Assessment Questions  1. SYMPTOM: \"What's the main symptom you're concerned about?\" (e.g., frequency, incontinence)        Burning with urination, slight blood tinge in urine, urine frequecny     2. ONSET: \"When did the  burning/blood in urine/frequency  start?\"        This evening around 6 pm     3. PAIN: \"Is there any pain?\" If Yes, ask: \"How bad is it?\" (Scale: 1-10; mild, moderate, severe)        Pain is a 8-10 out of 10     4. CAUSE: \"What do you think is causing the symptoms?\"        Patient thinks UTI     5. OTHER SYMPTOMS: \"Do you have any other symptoms?\" (e.g., blood in urine, fever, flank pain, pain with urination)    None    Protocols used: Urinary Symptoms-Adult-AH    "

## 2025-06-06 ENCOUNTER — RESULTS FOLLOW-UP (OUTPATIENT)
Dept: INTERNAL MEDICINE CLINIC | Facility: CLINIC | Age: 80
End: 2025-06-06

## 2025-06-07 LAB — BACTERIA UR CULT: ABNORMAL

## 2025-06-20 ENCOUNTER — OFFICE VISIT (OUTPATIENT)
Dept: INTERNAL MEDICINE CLINIC | Facility: CLINIC | Age: 80
End: 2025-06-20
Payer: COMMERCIAL

## 2025-06-20 VITALS
WEIGHT: 193 LBS | SYSTOLIC BLOOD PRESSURE: 134 MMHG | OXYGEN SATURATION: 98 % | HEART RATE: 75 BPM | HEIGHT: 65 IN | RESPIRATION RATE: 16 BRPM | BODY MASS INDEX: 32.15 KG/M2 | DIASTOLIC BLOOD PRESSURE: 80 MMHG | TEMPERATURE: 97.9 F

## 2025-06-20 DIAGNOSIS — J32.9 RHINOSINUSITIS: ICD-10-CM

## 2025-06-20 DIAGNOSIS — M94.0 COSTOCHONDRITIS: Primary | ICD-10-CM

## 2025-06-20 PROCEDURE — 99213 OFFICE O/P EST LOW 20 MIN: CPT

## 2025-06-20 PROCEDURE — G2211 COMPLEX E/M VISIT ADD ON: HCPCS

## 2025-06-20 RX ORDER — FEXOFENADINE HCL 60 MG/1
60 TABLET, FILM COATED ORAL DAILY
Qty: 30 TABLET | Refills: 2 | Status: SHIPPED | OUTPATIENT
Start: 2025-06-20

## 2025-06-20 RX ORDER — FLUTICASONE PROPIONATE 50 MCG
1 SPRAY, SUSPENSION (ML) NASAL DAILY
Start: 2025-06-20

## 2025-06-20 RX ORDER — FLUTICASONE PROPIONATE 50 MCG
1 SPRAY, SUSPENSION (ML) NASAL DAILY
Qty: 16 ML | Refills: 2 | Status: SHIPPED | OUTPATIENT
Start: 2025-06-20 | End: 2025-06-20

## 2025-06-20 NOTE — ASSESSMENT & PLAN NOTE
Recommend conservative management with Voltaren gel or short course (2-3 days) systemic NSAIDs vs Tylenol

## 2025-06-20 NOTE — PROGRESS NOTES
Name: Mariah Mendiola      : 1945      MRN: [id number]  Encounter Provider: Kaleb Pollard MD  Encounter Date: 2025   Encounter department: [facility] INTERNAL MEDICINE [place]  :  Assessment & Plan  Costochondritis  Recommend conservative management with Voltaren gel or short course (2-3 days) systemic NSAIDs vs Tylenol       Rhinosinusitis  No red-flag features on history or physical exam concerning for bacterial sinusitis  Recommend conservative management with fluticasone nasal spray, saline nasal spray, and a trial of 2nd generation antihistamine.  Orders:    sodium chloride (OCEAN) 0.65 % nasal spray; 1 spray into each nostril as needed for rhinitis    fexofenadine (ALLEGRA) 60 MG tablet; Take 1 tablet (60 mg total) by mouth daily    fluticasone (FLONASE) 50 mcg/act nasal spray; 1 spray into each nostril daily           History of Present Illness   Ms. Mendiola is seen in clinic this afternoon for evaluation of symptoms affecting the sinuses and chest. She reports that sinus symptoms have affected her intermittently since this winter, and that current symptoms have been off-and-on for the last two weeks. Sxs are described as a pressure-type sensation localized to the maxillary and frontal sinuses bilaterally. She rates the discomfort a 7 or 8 out of 10 in severity, but describes it as tolerable, and is more concerned that it could portend the development of pneumonia, as she sometimes feels the urge to take a deep breath, but feels she has trouble doing so. She states that she is not an anxious person, but that when this occurs, it makes her body feel anxious. She has a history of costochondritis, and symptoms of pain in the ribs in both the anterior and posterior chest has been more significant since she began using new weight machines at her gym. This pain is reproducible to palpation on exam. She denies fevers, chills, sweats, nasal congestion, rhinorrhea, new or clinically significant cough,  "wheezing. She reports that she had been taking Flonase until a few days ago, when she stopped out of concern that she not use it for too long. She continues to use a naturopathic nasal spray, Emphyclear.   Vital signs in clinic are unremarkable.       Review of Systems   Constitutional:  Negative for activity change, chills and fever.   HENT:  Positive for sinus pressure and sinus pain. Negative for congestion, ear discharge, ear pain, hearing loss and rhinorrhea.    Eyes:  Negative for visual disturbance.   Respiratory:  Negative for cough, choking, shortness of breath and wheezing.    Cardiovascular:  Positive for chest pain. Negative for palpitations.   Gastrointestinal:  Negative for abdominal pain.   Genitourinary:  Negative for dysuria, flank pain, frequency and urgency.   Musculoskeletal:         Anterior and posterior chest wall pain   Neurological:  Negative for headaches.   Hematological:  Negative for adenopathy.       Objective   /80 (BP Location: Right arm, Patient Position: Sitting, Cuff Size: Large)   Pulse 75   Temp 97.9 °F (36.6 °C) (Tympanic)   Resp 16   Ht 5' 5\" (1.651 m)   Wt 87.5 kg (193 lb)   SpO2 98%   BMI 32.12 kg/m²      Physical Exam  Vitals reviewed.   Constitutional:       General: She is not in acute distress.     Appearance: Normal appearance. She is obese. She is not ill-appearing, toxic-appearing or diaphoretic.   HENT:      Head: Normocephalic and atraumatic.      Right Ear: Tympanic membrane, ear canal and external ear normal. There is no impacted cerumen.      Left Ear: Tympanic membrane, ear canal and external ear normal. There is no impacted cerumen.      Nose: Nose normal. No nasal deformity, septal deviation, signs of injury, laceration, nasal tenderness, mucosal edema, congestion or rhinorrhea.      Right Nostril: No foreign body, epistaxis, septal hematoma or occlusion.      Left Nostril: No foreign body, epistaxis, septal hematoma or occlusion.      Right " Turbinates: Not enlarged, swollen or pale.      Left Turbinates: Not enlarged, swollen or pale.      Right Sinus: No maxillary sinus tenderness or frontal sinus tenderness.      Left Sinus: No maxillary sinus tenderness or frontal sinus tenderness.      Mouth/Throat:      Mouth: Mucous membranes are moist.      Pharynx: Oropharynx is clear. No oropharyngeal exudate or posterior oropharyngeal erythema.     Cardiovascular:      Rate and Rhythm: Normal rate and regular rhythm.      Pulses: Normal pulses.      Heart sounds: Normal heart sounds. No murmur heard.     No friction rub. No gallop.   Pulmonary:      Effort: Pulmonary effort is normal. No respiratory distress.      Breath sounds: Normal breath sounds. No stridor. No wheezing, rhonchi or rales.   Chest:      Chest wall: No tenderness.     Musculoskeletal:         General: Tenderness (tenderness to palpation multiple sites of anterior and posterior chest wall) present.     Skin:     General: Skin is warm and dry.      Coloration: Skin is not jaundiced or pale.     Neurological:      Mental Status: She is alert.     Psychiatric:         Mood and Affect: Mood normal.         Behavior: Behavior normal.

## 2025-07-16 ENCOUNTER — OFFICE VISIT (OUTPATIENT)
Dept: INTERNAL MEDICINE CLINIC | Facility: CLINIC | Age: 80
End: 2025-07-16
Payer: COMMERCIAL

## 2025-07-16 VITALS
OXYGEN SATURATION: 96 % | DIASTOLIC BLOOD PRESSURE: 80 MMHG | BODY MASS INDEX: 31.82 KG/M2 | RESPIRATION RATE: 16 BRPM | WEIGHT: 191 LBS | TEMPERATURE: 98.3 F | HEIGHT: 65 IN | HEART RATE: 74 BPM | SYSTOLIC BLOOD PRESSURE: 122 MMHG

## 2025-07-16 DIAGNOSIS — Z00.00 MEDICARE ANNUAL WELLNESS VISIT, SUBSEQUENT: Primary | ICD-10-CM

## 2025-07-16 DIAGNOSIS — E03.9 ACQUIRED HYPOTHYROIDISM: ICD-10-CM

## 2025-07-16 DIAGNOSIS — R73.03 PREDIABETES: ICD-10-CM

## 2025-07-16 DIAGNOSIS — N95.8 GENITOURINARY SYNDROME OF MENOPAUSE: ICD-10-CM

## 2025-07-16 PROBLEM — Z76.89 ENCOUNTER TO ESTABLISH CARE: Status: RESOLVED | Noted: 2022-02-03 | Resolved: 2025-07-16

## 2025-07-16 LAB
SL AMB  POCT GLUCOSE, UA: NORMAL
SL AMB LEUKOCYTE ESTERASE,UA: NORMAL
SL AMB POCT BILIRUBIN,UA: NORMAL
SL AMB POCT BLOOD,UA: NORMAL
SL AMB POCT CLARITY,UA: CLEAR
SL AMB POCT COLOR,UA: YELLOW
SL AMB POCT KETONES,UA: NORMAL
SL AMB POCT NITRITE,UA: NORMAL
SL AMB POCT PH,UA: 5
SL AMB POCT SPECIFIC GRAVITY,UA: 1.01
SL AMB POCT URINE PROTEIN: NORMAL
SL AMB POCT UROBILINOGEN: NORMAL

## 2025-07-16 PROCEDURE — G0439 PPPS, SUBSEQ VISIT: HCPCS | Performed by: INTERNAL MEDICINE

## 2025-07-16 PROCEDURE — 81002 URINALYSIS NONAUTO W/O SCOPE: CPT | Performed by: INTERNAL MEDICINE

## 2025-07-16 PROCEDURE — 99213 OFFICE O/P EST LOW 20 MIN: CPT | Performed by: INTERNAL MEDICINE

## 2025-07-16 PROCEDURE — G2211 COMPLEX E/M VISIT ADD ON: HCPCS | Performed by: INTERNAL MEDICINE

## 2025-07-16 RX ORDER — CONJUGATED ESTROGENS 0.62 MG/G
0.5 CREAM VAGINAL DAILY
Qty: 30 G | Refills: 1 | Status: SHIPPED | OUTPATIENT
Start: 2025-07-16

## 2025-07-16 NOTE — PATIENT INSTRUCTIONS
Medicare Preventive Visit Patient Instructions  Thank you for completing your Welcome to Medicare Visit or Medicare Annual Wellness Visit today. Your next wellness visit will be due in one year (7/17/2026).  The screening/preventive services that you may require over the next 5-10 years are detailed below. Some tests may not apply to you based off risk factors and/or age. Screening tests ordered at today's visit but not completed yet may show as past due. Also, please note that scanned in results may not display below.  Preventive Screenings:  Service Recommendations Previous Testing/Comments   Colorectal Cancer Screening  * Colonoscopy    * Fecal Occult Blood Test (FOBT)/Fecal Immunochemical Test (FIT)  * Fecal DNA/Cologuard Test  * Flexible Sigmoidoscopy Age: 45-75 years old   Colonoscopy: every 10 years (may be performed more frequently if at higher risk)  OR  FOBT/FIT: every 1 year  OR  Cologuard: every 3 years  OR  Sigmoidoscopy: every 5 years  Screening may be recommended earlier than age 45 if at higher risk for colorectal cancer. Also, an individualized decision between you and your healthcare provider will decide whether screening between the ages of 76-85 would be appropriate. Colonoscopy: Not on file  FOBT/FIT: Not on file  Cologuard: Not on file  Sigmoidoscopy: Not on file          Breast Cancer Screening Age: 40+ years old  Frequency: every 1-2 years  Not required if history of left and right mastectomy Mammogram: Not on file        Cervical Cancer Screening Between the ages of 21-29, pap smear recommended once every 3 years.   Between the ages of 30-65, can perform pap smear with HPV co-testing every 5 years.   Recommendations may differ for women with a history of total hysterectomy, cervical cancer, or abnormal pap smears in past. Pap Smear: 03/01/2018    Screening Not Indicated   Hepatitis C Screening Once for adults born between 1945 and 1965  More frequently in patients at high risk for Hepatitis  C Hep C Antibody: 01/27/2022    Screening Current   Diabetes Screening 1-2 times per year if you're at risk for diabetes or have pre-diabetes Fasting glucose: 92 mg/dL (6/5/2025)  A1C: 5.9 % (2/8/2025)  Screening Current   Cholesterol Screening Once every 5 years if you don't have a lipid disorder. May order more often based on risk factors. Lipid panel: 02/08/2025    Screening Not Indicated  History Lipid Disorder     Other Preventive Screenings Covered by Medicare:  Abdominal Aortic Aneurysm (AAA) Screening: covered once if your at risk. You're considered to be at risk if you have a family history of AAA.  Lung Cancer Screening: covers low dose CT scan once per year if you meet all of the following conditions: (1) Age 55-77; (2) No signs or symptoms of lung cancer; (3) Current smoker or have quit smoking within the last 15 years; (4) You have a tobacco smoking history of at least 20 pack years (packs per day multiplied by number of years you smoked); (5) You get a written order from a healthcare provider.  Glaucoma Screening: covered annually if you're considered high risk: (1) You have diabetes OR (2) Family history of glaucoma OR (3)  aged 50 and older OR (4)  American aged 65 and older  Osteoporosis Screening: covered every 2 years if you meet one of the following conditions: (1) You're estrogen deficient and at risk for osteoporosis based off medical history and other findings; (2) Have a vertebral abnormality; (3) On glucocorticoid therapy for more than 3 months; (4) Have primary hyperparathyroidism; (5) On osteoporosis medications and need to assess response to drug therapy.   Last bone density test (DXA Scan): 10/29/2024.  HIV Screening: covered annually if you're between the age of 15-65. Also covered annually if you are younger than 15 and older than 65 with risk factors for HIV infection. For pregnant patients, it is covered up to 3 times per  pregnancy.    Immunizations:  Immunization Recommendations   Influenza Vaccine Annual influenza vaccination during flu season is recommended for all persons aged >= 6 months who do not have contraindications   Pneumococcal Vaccine   * Pneumococcal conjugate vaccine = PCV13 (Prevnar 13), PCV15 (Vaxneuvance), PCV20 (Prevnar 20)  * Pneumococcal polysaccharide vaccine = PPSV23 (Pneumovax) Adults 19-63 yo with certain risk factors or if 65+ yo  If never received any pneumonia vaccine: recommend Prevnar 20 (PCV20)  Give PCV20 if previously received 1 dose of PCV13 or PPSV23   Hepatitis B Vaccine 3 dose series if at intermediate or high risk (ex: diabetes, end stage renal disease, liver disease)   Respiratory syncytial virus (RSV) Vaccine - COVERED BY MEDICARE PART D  * RSVPreF3 (Arexvy) CDC recommends that adults 60 years of age and older may receive a single dose of RSV vaccine using shared clinical decision-making (SCDM)   Tetanus (Td) Vaccine - COST NOT COVERED BY MEDICARE PART B Following completion of primary series, a booster dose should be given every 10 years to maintain immunity against tetanus. Td may also be given as tetanus wound prophylaxis.   Tdap Vaccine - COST NOT COVERED BY MEDICARE PART B Recommended at least once for all adults. For pregnant patients, recommended with each pregnancy.   Shingles Vaccine (Shingrix) - COST NOT COVERED BY MEDICARE PART B  2 shot series recommended in those 19 years and older who have or will have weakened immune systems or those 50 years and older     Health Maintenance Due:      Topic Date Due   • DXA SCAN  10/29/2029   • Hepatitis C Screening  Completed     Immunizations Due:      Topic Date Due   • Pneumococcal Vaccine: 50+ Years (1 of 1 - PCV) Never done   • COVID-19 Vaccine (1 - 2024-25 season) Never done     Advance Directives   What are advance directives?  Advance directives are legal documents that state your wishes and plans for medical care. These plans are made  ahead of time in case you lose your ability to make decisions for yourself. Advance directives can apply to any medical decision, such as the treatments you want, and if you want to donate organs.   What are the types of advance directives?  There are many types of advance directives, and each state has rules about how to use them. You may choose a combination of any of the following:  Living will:  This is a written record of the treatment you want. You can also choose which treatments you do not want, which to limit, and which to stop at a certain time. This includes surgery, medicine, IV fluid, and tube feedings.   Durable power of  for healthcare (DPAHC):  This is a written record that states who you want to make healthcare choices for you when you are unable to make them for yourself. This person, called a proxy, is usually a family member or a friend. You may choose more than 1 proxy.  Do not resuscitate (DNR) order:  A DNR order is used in case your heart stops beating or you stop breathing. It is a request not to have certain forms of treatment, such as CPR. A DNR order may be included in other types of advance directives.  Medical directive:  This covers the care that you want if you are in a coma, near death, or unable to make decisions for yourself. You can list the treatments you want for each condition. Treatment may include pain medicine, surgery, blood transfusions, dialysis, IV or tube feedings, and a ventilator (breathing machine).  Values history:  This document has questions about your views, beliefs, and how you feel and think about life. This information can help others choose the care that you would choose.  Why are advance directives important?  An advance directive helps you control your care. Although spoken wishes may be used, it is better to have your wishes written down. Spoken wishes can be misunderstood, or not followed. Treatments may be given even if you do not want them. An  advance directive may make it easier for your family to make difficult choices about your care.   Weight Management   Why it is important to manage your weight:  Being overweight increases your risk of health conditions such as heart disease, high blood pressure, type 2 diabetes, and certain types of cancer. It can also increase your risk for osteoarthritis, sleep apnea, and other respiratory problems. Aim for a slow, steady weight loss. Even a small amount of weight loss can lower your risk of health problems.  How to lose weight safely:  A safe and healthy way to lose weight is to eat fewer calories and get regular exercise. You can lose up about 1 pound a week by decreasing the number of calories you eat by 500 calories each day.   Healthy meal plan for weight management:  A healthy meal plan includes a variety of foods, contains fewer calories, and helps you stay healthy. A healthy meal plan includes the following:  Eat whole-grain foods more often.  A healthy meal plan should contain fiber. Fiber is the part of grains, fruits, and vegetables that is not broken down by your body. Whole-grain foods are healthy and provide extra fiber in your diet. Some examples of whole-grain foods are whole-wheat breads and pastas, oatmeal, brown rice, and bulgur.  Eat a variety of vegetables every day.  Include dark, leafy greens such as spinach, kale, ganga greens, and mustard greens. Eat yellow and orange vegetables such as carrots, sweet potatoes, and winter squash.   Eat a variety of fruits every day.  Choose fresh or canned fruit (canned in its own juice or light syrup) instead of juice. Fruit juice has very little or no fiber.  Eat low-fat dairy foods.  Drink fat-free (skim) milk or 1% milk. Eat fat-free yogurt and low-fat cottage cheese. Try low-fat cheeses such as mozzarella and other reduced-fat cheeses.  Choose meat and other protein foods that are low in fat.  Choose beans or other legumes such as split peas or  lentils. Choose fish, skinless poultry (chicken or turkey), or lean cuts of red meat (beef or pork). Before you cook meat or poultry, cut off any visible fat.   Use less fat and oil.  Try baking foods instead of frying them. Add less fat, such as margarine, sour cream, regular salad dressing and mayonnaise to foods. Eat fewer high-fat foods. Some examples of high-fat foods include french fries, doughnuts, ice cream, and cakes.  Eat fewer sweets.  Limit foods and drinks that are high in sugar. This includes candy, cookies, regular soda, and sweetened drinks.  Exercise:  Exercise at least 30 minutes per day on most days of the week. Some examples of exercise include walking, biking, dancing, and swimming. You can also fit in more physical activity by taking the stairs instead of the elevator or parking farther away from stores. Ask your healthcare provider about the best exercise plan for you.    © Copyright [employer] 2018 Information is for End User's use only and may not be sold, redistributed or otherwise used for commercial purposes. All illustrations and images included in CareNotes® are the copyrighted property of [employer] or [employer]

## 2025-07-16 NOTE — PROGRESS NOTES
Name: Mariah Mendiola      : 1945      MRN: [id number]  Encounter Provider: Inge Faust MD  Encounter Date: 2025   Encounter department: [facility] [place]  :  Assessment & Plan  Medicare annual wellness visit, subsequent  Preventive care done in detail, patient does not wish to have vaccines       Acquired hypothyroidism  Continue synthroid 100mcg, will check TSH  Orders:    TSH, 3rd generation with Free T4 reflex; Future    Hemoglobin A1C; Future    Comprehensive metabolic panel; Future    CBC and Platelet; Future    Iodine, Serum/Plasma; Future    Prediabetes  Re-check A1c  Orders:    Hemoglobin A1C; Future    CBC and Platelet; Future    Genitourinary syndrome of menopause  Having symptoms of dysuria, urgency previously treated with antibiotics but DIP in the office was normal, will treat with vaginal cream, increase hydration  Orders:    estrogens, conjugated (Premarin) vaginal cream; Insert 0.5 g into the vagina daily Pea size in the urethra daily then 3 x a week       Preventive health issues were discussed with patient, and age appropriate screening tests were ordered as noted in patient's After Visit Summary. Personalized health advice and appropriate referrals for health education or preventive services given if needed, as noted in patient's After Visit Summary.    History of Present Illness     Patient here for medicare wellness, has complaints of end of urinary stream pain and urgency that started yesterday, no systemic symptoms of abdominal pain. No hematuria.  Previously treated for UTI 1 month ago but low count pan sensitive E.coli.   She is very active, plays pickleball regularly         Patient Care Team:  Inge Faust MD as PCP - General (Internal Medicine)  Harriett Shahid MD    Review of Systems   Constitutional:  Positive for fever. Negative for appetite change and fatigue.   Respiratory:  Negative for cough, chest tightness, shortness of breath and  wheezing.    Cardiovascular:  Negative for chest pain, palpitations and leg swelling.   Gastrointestinal:  Negative for abdominal pain, nausea and vomiting.   Genitourinary:  Positive for dysuria and urgency. Negative for difficulty urinating and frequency.   Musculoskeletal:  Negative for arthralgias and joint swelling.   Skin:  Negative for rash.   Neurological:  Negative for dizziness and headaches.   Psychiatric/Behavioral:  Negative for sleep disturbance.      Medical History Reviewed by provider this encounter:  Tobacco  Allergies  Meds  Problems  Med Hx  Surg Hx  Fam Hx       Annual Wellness Visit Questionnaire   Mariah is here for her Subsequent Wellness visit.     Health Risk Assessment:   Patient rates overall health as good. Patient feels that their physical health rating is same. Patient is satisfied with their life. Eyesight was rated as same. Hearing was rated as same. Patient feels that their emotional and mental health rating is same. Patients states they are never, rarely angry. Patient states they are sometimes unusually tired/fatigued. Pain experienced in the last 7 days has been some. Patient's pain rating has been 4/10. Patient states that she has experienced no weight loss or gain in last 6 months.     Fall Risk Screening:   In the past year, patient has experienced: no history of falling in past year      Urinary Incontinence Screening:   Patient has not leaked urine accidently in the last six months.     Home Safety:  Patient has trouble with stairs inside or outside of their home. Patient has working smoke alarms and has working carbon monoxide detector. Home safety hazards include: none.     Nutrition:   Current diet is Regular and Limited junk food.     Medications:   Patient is currently taking over-the-counter supplements. OTC medications include: see medication list. Patient is able to manage medications.     Activities of Daily Living (ADLs)/Instrumental Activities of Daily Living  (IADLs):   Walk and transfer into and out of bed and chair?: Yes  Dress and groom yourself?: Yes    Bathe or shower yourself?: Yes    Feed yourself? Yes  Do your laundry/housekeeping?: Yes  Manage your money, pay your bills and track your expenses?: Yes  Make your own meals?: Yes    Do your own shopping?: Yes    Durable Medical Equipment Suppliers  N/A    Previous Hospitalizations:   Any hospitalizations or ED visits within the last 12 months?: No      Advance Care Planning:   Living will: Yes    Durable POA for healthcare: Yes    Advanced directive: Yes      Preventive Screenings      Cardiovascular Screening:    General: Screening Not Indicated and History Lipid Disorder      Diabetes Screening:     General: Screening Current      Colorectal Cancer Screening:     General: Screening Not Indicated      Breast Cancer Screening:     General: Screening Not Indicated      Cervical Cancer Screening:    General: Screening Not Indicated      Osteoporosis Screening:    General: Screening Not Indicated and History Osteoporosis      Abdominal Aortic Aneurysm (AAA) Screening:        General: Screening Not Indicated      Lung Cancer Screening:     General: Screening Not Indicated      Hepatitis C Screening:    General: Screening Current    Immunizations:  - Immunizations due: Prevnar 20 and Zoster (Shingrix)    Screening, Brief Intervention, and Referral to Treatment (SBIRT)     Screening  Typical number of drinks in a day: 0  Typical number of drinks in a week: 0  Interpretation: Low risk drinking behavior.    AUDIT-C Screenin) How often did you have a drink containing alcohol in the past year? monthly or less  2) How many drinks did you have on a typical day when you were drinking in the past year? 0  3) How often did you have 6 or more drinks on one occasion in the past year? never    AUDIT-C Score: 1  Interpretation: Score 0-2 (female): Negative screen for alcohol misuse    Single Item Drug Screening:  How often have  "you used an illegal drug (including marijuana) or a prescription medication for non-medical reasons in the past year? never    Single Item Drug Screen Score: 0  Interpretation: Negative screen for possible drug use disorder    Other Counseling Topics:   Calcium and vitamin D intake and regular weightbearing exercise.     Social Drivers of Health     Financial Resource Strain: Low Risk  (9/29/2024)    Received from [facility]    Overall Financial Resource Strain (CARDIA)     Difficulty of Paying Living Expenses: Not very hard   Food Insecurity: No Food Insecurity (7/16/2025)    Nursing - Inadequate Food Risk Classification     Worried About Running Out of Food in the Last Year: Never true     Ran Out of Food in the Last Year: Never true   Transportation Needs: No Transportation Needs (7/16/2025)    PRAPARE - Transportation     Lack of Transportation (Medical): No     Lack of Transportation (Non-Medical): No   Housing Stability: Low Risk  (7/16/2025)    Housing Stability Vital Sign     Unable to Pay for Housing in the Last Year: No     Number of Times Moved in the Last Year: 0     Homeless in the Last Year: No   Utilities: Not At Risk (7/16/2025)    [facility] Utilities     Threatened with loss of utilities: No     No results found.    Objective   /80 (BP Location: Left arm, Patient Position: Sitting, Cuff Size: Large)   Pulse 74   Temp 98.3 °F (36.8 °C) (Tympanic)   Resp 16   Ht 5' 5\" (1.651 m)   Wt 86.6 kg (191 lb)   SpO2 96%   BMI 31.78 kg/m²     Physical Exam  Vitals and nursing note reviewed.   Constitutional:       General: She is not in acute distress.     Appearance: She is well-developed.   HENT:      Head: Normocephalic and atraumatic.     Eyes:      Conjunctiva/sclera: Conjunctivae normal.       Cardiovascular:      Rate and Rhythm: Normal rate and regular rhythm.      Heart sounds: No murmur heard.  Pulmonary:      Effort: Pulmonary effort is normal. No respiratory distress.      " Breath sounds: Normal breath sounds.   Abdominal:      General: There is no distension.      Palpations: Abdomen is soft.      Tenderness: There is abdominal tenderness (left lower ribs). There is no right CVA tenderness, left CVA tenderness, guarding or rebound.     Musculoskeletal:         General: No swelling.      Cervical back: Neck supple.     Skin:     General: Skin is warm and dry.      Capillary Refill: Capillary refill takes less than 2 seconds.     Neurological:      Mental Status: She is alert.     Psychiatric:         Mood and Affect: Mood normal.

## 2025-07-16 NOTE — ASSESSMENT & PLAN NOTE
Continue synthroid 100mcg, will check TSH  Orders:    TSH, 3rd generation with Free T4 reflex; Future    Hemoglobin A1C; Future    Comprehensive metabolic panel; Future    CBC and Platelet; Future    Iodine, Serum/Plasma; Future

## 2025-07-18 ENCOUNTER — APPOINTMENT (OUTPATIENT)
Dept: LAB | Facility: HOSPITAL | Age: 80
End: 2025-07-18
Attending: INTERNAL MEDICINE
Payer: COMMERCIAL

## 2025-07-18 DIAGNOSIS — E03.9 ACQUIRED HYPOTHYROIDISM: ICD-10-CM

## 2025-07-18 DIAGNOSIS — R73.03 PREDIABETES: ICD-10-CM

## 2025-07-18 LAB
ALBUMIN SERPL BCG-MCNC: 4.1 G/DL (ref 3.5–5)
ALP SERPL-CCNC: 66 U/L (ref 34–104)
ALT SERPL W P-5'-P-CCNC: 15 U/L (ref 7–52)
ANION GAP SERPL CALCULATED.3IONS-SCNC: 7 MMOL/L (ref 4–13)
AST SERPL W P-5'-P-CCNC: 23 U/L (ref 13–39)
BILIRUB SERPL-MCNC: 0.68 MG/DL (ref 0.2–1)
BUN SERPL-MCNC: 14 MG/DL (ref 5–25)
CALCIUM SERPL-MCNC: 9.7 MG/DL (ref 8.4–10.2)
CHLORIDE SERPL-SCNC: 104 MMOL/L (ref 96–108)
CO2 SERPL-SCNC: 30 MMOL/L (ref 21–32)
CREAT SERPL-MCNC: 0.72 MG/DL (ref 0.6–1.3)
ERYTHROCYTE [DISTWIDTH] IN BLOOD BY AUTOMATED COUNT: 13.1 % (ref 11.6–15.1)
GFR SERPL CREATININE-BSD FRML MDRD: 78 ML/MIN/1.73SQ M
GLUCOSE P FAST SERPL-MCNC: 93 MG/DL (ref 65–99)
HCT VFR BLD AUTO: 41.1 % (ref 34.8–46.1)
HGB BLD-MCNC: 13.1 G/DL (ref 11.5–15.4)
MCH RBC QN AUTO: 29.8 PG (ref 26.8–34.3)
MCHC RBC AUTO-ENTMCNC: 31.9 G/DL (ref 31.4–37.4)
MCV RBC AUTO: 94 FL (ref 82–98)
PLATELET # BLD AUTO: 298 THOUSANDS/UL (ref 149–390)
PMV BLD AUTO: 10.3 FL (ref 8.9–12.7)
POTASSIUM SERPL-SCNC: 4.1 MMOL/L (ref 3.5–5.3)
PROT SERPL-MCNC: 7.2 G/DL (ref 6.4–8.4)
RBC # BLD AUTO: 4.39 MILLION/UL (ref 3.81–5.12)
SODIUM SERPL-SCNC: 141 MMOL/L (ref 135–147)
TSH SERPL DL<=0.05 MIU/L-ACNC: 0.85 UIU/ML (ref 0.45–4.5)
WBC # BLD AUTO: 6.8 THOUSAND/UL (ref 4.31–10.16)

## 2025-07-18 PROCEDURE — 85027 COMPLETE CBC AUTOMATED: CPT

## 2025-07-18 PROCEDURE — 83789 MASS SPECTROMETRY QUAL/QUAN: CPT

## 2025-07-18 PROCEDURE — 84443 ASSAY THYROID STIM HORMONE: CPT

## 2025-07-18 PROCEDURE — 80053 COMPREHEN METABOLIC PANEL: CPT

## 2025-07-18 PROCEDURE — 83036 HEMOGLOBIN GLYCOSYLATED A1C: CPT

## 2025-07-18 PROCEDURE — 36415 COLL VENOUS BLD VENIPUNCTURE: CPT

## 2025-07-19 LAB
EST. AVERAGE GLUCOSE BLD GHB EST-MCNC: 120 MG/DL
HBA1C MFR BLD: 5.8 %

## 2025-07-21 LAB — IODINE SERPL-MCNC: 57 UG/L (ref 33.5–71.9)

## 2025-07-24 ENCOUNTER — DOCUMENTATION (OUTPATIENT)
Dept: ADMINISTRATIVE | Facility: OTHER | Age: 80
End: 2025-07-24

## 2025-07-24 NOTE — PROGRESS NOTES
07/24/25 12:26 PM    Annual Wellness Visit outreach is not required, an AWV was completed at the PCP office.    Thank you.  LA NENA HU MA  [facility]

## 2025-07-30 DIAGNOSIS — E03.9 ACQUIRED HYPOTHYROIDISM: ICD-10-CM

## 2025-07-30 RX ORDER — LEVOTHYROXINE SODIUM 75 MCG
75 TABLET ORAL DAILY
Qty: 30 TABLET | Refills: 5 | Status: SHIPPED | OUTPATIENT
Start: 2025-07-30

## (undated) DEVICE — PADS GROUND

## (undated) DEVICE — ANTIBACTERIAL UNDYED BRAIDED (POLYGLACTIN 910), SYNTHETIC ABSORBABLE SUTURE: Brand: COATED VICRYL

## (undated) DEVICE — GLOVE SRG BIOGEL 7.5

## (undated) DEVICE — INTENDED FOR TISSUE SEPARATION, AND OTHER PROCEDURES THAT REQUIRE A SHARP SURGICAL BLADE TO PUNCTURE OR CUT.: Brand: BARD-PARKER SAFETY BLADES SIZE 15, STERILE

## (undated) DEVICE — NEEDLE 25G X 1 1/2

## (undated) DEVICE — BETHLEHEM UNIVERSAL MINOR GEN: Brand: CARDINAL HEALTH

## (undated) DEVICE — SUT MONOCRYL 4-0 PS-2 18 IN Y496G

## (undated) DEVICE — MINOR PROCEDURE DRAPE: Brand: CONVERTORS

## (undated) DEVICE — LIGHT HANDLE COVER SLEEVE DISP BLUE STELLAR

## (undated) DEVICE — NEPTUNE E-SEP SMOKE EVACUATION PENCIL, COATED, 70MM BLADE, PUSH BUTTON SWITCH: Brand: NEPTUNE E-SEP